# Patient Record
Sex: MALE | Race: WHITE | NOT HISPANIC OR LATINO | URBAN - METROPOLITAN AREA
[De-identification: names, ages, dates, MRNs, and addresses within clinical notes are randomized per-mention and may not be internally consistent; named-entity substitution may affect disease eponyms.]

---

## 2019-02-19 RX ORDER — CEFDINIR 250 MG/5ML
1 POWDER, FOR SUSPENSION ORAL
Qty: 0 | Refills: 0 | DISCHARGE
Start: 2019-02-19 | End: 2019-02-28

## 2019-02-19 RX ORDER — AZITHROMYCIN 500 MG/1
0 TABLET, FILM COATED ORAL
Qty: 0 | Refills: 0 | DISCHARGE
Start: 2019-02-19 | End: 2019-02-23

## 2019-02-22 ENCOUNTER — INPATIENT (INPATIENT)
Facility: HOSPITAL | Age: 59
LOS: 12 days | Discharge: HOME | DRG: 308 | End: 2019-03-07
Attending: INTERNAL MEDICINE | Admitting: INTERNAL MEDICINE
Payer: COMMERCIAL

## 2019-02-22 VITALS
RESPIRATION RATE: 24 BRPM | TEMPERATURE: 98 F | HEART RATE: 135 BPM | WEIGHT: 315 LBS | SYSTOLIC BLOOD PRESSURE: 166 MMHG | HEIGHT: 76 IN | OXYGEN SATURATION: 97 % | DIASTOLIC BLOOD PRESSURE: 79 MMHG

## 2019-02-22 DIAGNOSIS — I49.8 OTHER SPECIFIED CARDIAC ARRHYTHMIAS: ICD-10-CM

## 2019-02-22 DIAGNOSIS — I48.92 UNSPECIFIED ATRIAL FLUTTER: ICD-10-CM

## 2019-02-22 DIAGNOSIS — R06.00 DYSPNEA, UNSPECIFIED: ICD-10-CM

## 2019-02-22 DIAGNOSIS — R07.9 CHEST PAIN, UNSPECIFIED: ICD-10-CM

## 2019-02-22 DIAGNOSIS — Z83.2 FAMILY HISTORY OF DISEASES OF THE BLOOD AND BLOOD-FORMING ORGANS AND CERTAIN DISORDERS INVOLVING THE IMMUNE MECHANISM: ICD-10-CM

## 2019-02-22 LAB
ALBUMIN SERPL ELPH-MCNC: 4.2 G/DL — SIGNIFICANT CHANGE UP (ref 3.3–5)
ALP SERPL-CCNC: 70 U/L — SIGNIFICANT CHANGE UP (ref 40–120)
ALT FLD-CCNC: 52 U/L — HIGH (ref 10–45)
ANION GAP SERPL CALC-SCNC: 13 MMOL/L — SIGNIFICANT CHANGE UP (ref 5–17)
APTT BLD: 26.8 SEC — LOW (ref 27.5–36.3)
APTT BLD: 71.7 SEC — HIGH (ref 27.5–36.3)
AST SERPL-CCNC: 45 U/L — HIGH (ref 10–40)
BASOPHILS # BLD AUTO: 0.1 K/UL — SIGNIFICANT CHANGE UP (ref 0–0.2)
BASOPHILS NFR BLD AUTO: 1.2 % — SIGNIFICANT CHANGE UP (ref 0–2)
BILIRUB SERPL-MCNC: 0.6 MG/DL — SIGNIFICANT CHANGE UP (ref 0.2–1.2)
BUN SERPL-MCNC: 15 MG/DL — SIGNIFICANT CHANGE UP (ref 7–23)
CALCIUM SERPL-MCNC: 9.2 MG/DL — SIGNIFICANT CHANGE UP (ref 8.4–10.5)
CHLORIDE SERPL-SCNC: 102 MMOL/L — SIGNIFICANT CHANGE UP (ref 96–108)
CO2 SERPL-SCNC: 24 MMOL/L — SIGNIFICANT CHANGE UP (ref 22–31)
CREAT SERPL-MCNC: 1.31 MG/DL — HIGH (ref 0.5–1.3)
EOSINOPHIL # BLD AUTO: 0.1 K/UL — SIGNIFICANT CHANGE UP (ref 0–0.5)
EOSINOPHIL NFR BLD AUTO: 1.8 % — SIGNIFICANT CHANGE UP (ref 0–6)
GAS PNL BLDV: SIGNIFICANT CHANGE UP
GLUCOSE SERPL-MCNC: 101 MG/DL — HIGH (ref 70–99)
HCT VFR BLD CALC: 41.8 % — SIGNIFICANT CHANGE UP (ref 39–50)
HGB BLD-MCNC: 13.9 G/DL — SIGNIFICANT CHANGE UP (ref 13–17)
HMPV RNA SPEC QL NAA+PROBE: DETECTED
INR BLD: 1.29 RATIO — HIGH (ref 0.88–1.16)
LYMPHOCYTES # BLD AUTO: 1.1 K/UL — SIGNIFICANT CHANGE UP (ref 1–3.3)
LYMPHOCYTES # BLD AUTO: 19 % — SIGNIFICANT CHANGE UP (ref 13–44)
MCHC RBC-ENTMCNC: 30.4 PG — SIGNIFICANT CHANGE UP (ref 27–34)
MCHC RBC-ENTMCNC: 33.2 GM/DL — SIGNIFICANT CHANGE UP (ref 32–36)
MCV RBC AUTO: 91.5 FL — SIGNIFICANT CHANGE UP (ref 80–100)
MONOCYTES # BLD AUTO: 0.7 K/UL — SIGNIFICANT CHANGE UP (ref 0–0.9)
MONOCYTES NFR BLD AUTO: 12.2 % — SIGNIFICANT CHANGE UP (ref 2–14)
NEUTROPHILS # BLD AUTO: 4 K/UL — SIGNIFICANT CHANGE UP (ref 1.8–7.4)
NEUTROPHILS NFR BLD AUTO: 65.9 % — SIGNIFICANT CHANGE UP (ref 43–77)
NT-PROBNP SERPL-SCNC: 1746 PG/ML — HIGH (ref 0–300)
PLATELET # BLD AUTO: 168 K/UL — SIGNIFICANT CHANGE UP (ref 150–400)
POTASSIUM SERPL-MCNC: 4.2 MMOL/L — SIGNIFICANT CHANGE UP (ref 3.5–5.3)
POTASSIUM SERPL-SCNC: 4.2 MMOL/L — SIGNIFICANT CHANGE UP (ref 3.5–5.3)
PROT SERPL-MCNC: 7.4 G/DL — SIGNIFICANT CHANGE UP (ref 6–8.3)
PROTHROM AB SERPL-ACNC: 15 SEC — HIGH (ref 10–12.9)
RAPID RVP RESULT: DETECTED
RBC # BLD: 4.57 M/UL — SIGNIFICANT CHANGE UP (ref 4.2–5.8)
RBC # FLD: 13.5 % — SIGNIFICANT CHANGE UP (ref 10.3–14.5)
SODIUM SERPL-SCNC: 139 MMOL/L — SIGNIFICANT CHANGE UP (ref 135–145)
TROPONIN T, HIGH SENSITIVITY RESULT: 20 NG/L — SIGNIFICANT CHANGE UP (ref 0–51)
WBC # BLD: 6 K/UL — SIGNIFICANT CHANGE UP (ref 3.8–10.5)
WBC # FLD AUTO: 6 K/UL — SIGNIFICANT CHANGE UP (ref 3.8–10.5)

## 2019-02-22 PROCEDURE — 71046 X-RAY EXAM CHEST 2 VIEWS: CPT | Mod: 26

## 2019-02-22 PROCEDURE — 99285 EMERGENCY DEPT VISIT HI MDM: CPT | Mod: 25

## 2019-02-22 PROCEDURE — 93010 ELECTROCARDIOGRAM REPORT: CPT

## 2019-02-22 PROCEDURE — 71275 CT ANGIOGRAPHY CHEST: CPT | Mod: 26

## 2019-02-22 RX ORDER — HEPARIN SODIUM 5000 [USP'U]/ML
10000 INJECTION INTRAVENOUS; SUBCUTANEOUS EVERY 6 HOURS
Refills: 0 | Status: DISCONTINUED | OUTPATIENT
Start: 2019-02-22 | End: 2019-02-27

## 2019-02-22 RX ORDER — HEPARIN SODIUM 5000 [USP'U]/ML
10000 INJECTION INTRAVENOUS; SUBCUTANEOUS ONCE
Refills: 0 | Status: DISCONTINUED | OUTPATIENT
Start: 2019-02-22 | End: 2019-02-22

## 2019-02-22 RX ORDER — HEPARIN SODIUM 5000 [USP'U]/ML
10000 INJECTION INTRAVENOUS; SUBCUTANEOUS EVERY 6 HOURS
Refills: 0 | Status: DISCONTINUED | OUTPATIENT
Start: 2019-02-22 | End: 2019-02-22

## 2019-02-22 RX ORDER — HEPARIN SODIUM 5000 [USP'U]/ML
5000 INJECTION INTRAVENOUS; SUBCUTANEOUS EVERY 6 HOURS
Refills: 0 | Status: DISCONTINUED | OUTPATIENT
Start: 2019-02-22 | End: 2019-02-22

## 2019-02-22 RX ORDER — METOPROLOL TARTRATE 50 MG
50 TABLET ORAL
Refills: 0 | Status: DISCONTINUED | OUTPATIENT
Start: 2019-02-22 | End: 2019-03-01

## 2019-02-22 RX ORDER — DILTIAZEM HCL 120 MG
30 CAPSULE, EXT RELEASE 24 HR ORAL ONCE
Refills: 0 | Status: COMPLETED | OUTPATIENT
Start: 2019-02-22 | End: 2019-02-22

## 2019-02-22 RX ORDER — DILTIAZEM HCL 120 MG
10 CAPSULE, EXT RELEASE 24 HR ORAL ONCE
Refills: 0 | Status: COMPLETED | OUTPATIENT
Start: 2019-02-22 | End: 2019-02-22

## 2019-02-22 RX ORDER — HEPARIN SODIUM 5000 [USP'U]/ML
5000 INJECTION INTRAVENOUS; SUBCUTANEOUS EVERY 6 HOURS
Refills: 0 | Status: DISCONTINUED | OUTPATIENT
Start: 2019-02-22 | End: 2019-02-27

## 2019-02-22 RX ORDER — HEPARIN SODIUM 5000 [USP'U]/ML
INJECTION INTRAVENOUS; SUBCUTANEOUS
Qty: 25000 | Refills: 0 | Status: DISCONTINUED | OUTPATIENT
Start: 2019-02-22 | End: 2019-02-27

## 2019-02-22 RX ORDER — HEPARIN SODIUM 5000 [USP'U]/ML
INJECTION INTRAVENOUS; SUBCUTANEOUS
Qty: 25000 | Refills: 0 | Status: DISCONTINUED | OUTPATIENT
Start: 2019-02-22 | End: 2019-02-22

## 2019-02-22 RX ORDER — ASPIRIN/CALCIUM CARB/MAGNESIUM 324 MG
81 TABLET ORAL DAILY
Refills: 0 | Status: DISCONTINUED | OUTPATIENT
Start: 2019-02-22 | End: 2019-03-01

## 2019-02-22 RX ORDER — HEPARIN SODIUM 5000 [USP'U]/ML
10000 INJECTION INTRAVENOUS; SUBCUTANEOUS ONCE
Refills: 0 | Status: COMPLETED | OUTPATIENT
Start: 2019-02-22 | End: 2019-02-22

## 2019-02-22 RX ADMIN — HEPARIN SODIUM 2400 UNIT(S)/HR: 5000 INJECTION INTRAVENOUS; SUBCUTANEOUS at 16:45

## 2019-02-22 RX ADMIN — Medication 10 MILLIGRAM(S): at 17:15

## 2019-02-22 RX ADMIN — Medication 30 MILLIGRAM(S): at 17:18

## 2019-02-22 RX ADMIN — HEPARIN SODIUM 10000 UNIT(S): 5000 INJECTION INTRAVENOUS; SUBCUTANEOUS at 16:44

## 2019-02-22 NOTE — ED ADULT NURSE NOTE - OBJECTIVE STATEMENT
Came in with c/o SOB continuous, no improvement, cannot lie down, and has worsening b/l leg swelling. Started back pain, since yesterday, sharp, mid/high, 8/10, continuous, some relief by taking aspirin. Denies long travel or trauma. May have sick contact, his house is close to a hospital. Had runny nose and cough x 2-3 wks.

## 2019-02-22 NOTE — ED ADULT NURSE REASSESSMENT NOTE - NS ED NURSE REASSESS COMMENT FT1
Report received from Christiano PIZARRO. Pt AAOx4, NAD, resp mildly labored, skin warm/dry, resting comfortably in bed. Pt c/o . Pt denies headache, dizziness, chest pain, palpitations, abd pain, n/v/d, urinary symptoms, fevers, chills, weakness at this time. Pt awaiting CT scan. Safety maintained.

## 2019-02-22 NOTE — ED PROVIDER NOTE - PHYSICAL EXAMINATION
PGY1/MD Nilo.   VITALS: reviewed  GEN: Mild distress, A & O x 4  HEAD/EYES: NC/AT, anicteric sclerae, no conjunctival pallor  ENT: mucus membranes moist, oropharynx WNL, trachea midline, no JVD, neck is supple  RESP: lungs CTA with equal breath sounds bilaterally, no remarkable wheeze or crackle, chest wall nontender and atraumatic  CV: heart with reg rhythm S1, S2, no murmur; distal pulses intact and symmetric bilaterally, b/l leg swelling/skin tightness with no erythema or (-) Davalos's signs  ABDOMEN: normoactive bowel sounds, soft, nondistended, nontender, no palpable masses  MSK: extremities atraumatic and nontender, no edema, no asymmetry.   SKIN: warm, dry, no rash, no bruising, no cyanosis. color appropriate for ethnicity  NEURO: alert, mentating appropriately, no facial asymmetry.  PSYCH: Affect appropriate PGY1/MD Nilo.   VITALS: reviewed  GEN: Mild distress, A & O x 4  HEAD/EYES: NC/AT, anicteric sclerae, no conjunctival pallor  ENT: mucus membranes moist, oropharynx WNL, trachea midline, no JVD, neck is supple  RESP: lungs CTA with equal breath sounds bilaterally, no remarkable wheeze or crackle, chest wall nontender and atraumatic  CV: heart with reg rhythm S1, S2, no murmur; distal pulses intact and symmetric bilaterally, b/l leg swelling/skin tightness with no erythema or (-) Davalos's signs  ABDOMEN: normoactive bowel sounds, soft, nondistended, nontender, no palpable masses  MSK: extremities atraumatic and nontender, no edema, no asymmetry.   SKIN: warm, dry, no rash, no bruising, no cyanosis. color appropriate for ethnicity  NEURO: alert, mentating appropriately, no facial asymmetry.  PSYCH: Affect appropriate      Attn - tachycardia, no pallor, moist mm, lungs - clear, no w/r/r, cor - tachycardia, abdo - morbidly obese.  ext - no edema or C/T. neuro - grossly intact. skin - warm and dry.

## 2019-02-22 NOTE — H&P ADULT - ASSESSMENT
59 yo M with no significant PMH but has significant FH of PE (brother  of PE, sister had PE), genetic hypercoagulation (nephew), p/w 2 wk, SOB. Continuous, no improvement, cannot lie down, and has worsening b/l leg swelling. Started back pain, since yesterday, sharp, mid/high, 8/10, continuous, some relief by taking aspirin. Denies long travel or trauma. May have sick contact, his house is close to a hospital. Had runny nose and cough x 2-3 wks. Was seen in [facility] and started on Abxs . His symptoms got worse so came here .

## 2019-02-22 NOTE — ED ADULT NURSE REASSESSMENT NOTE - NS ED NURSE REASSESS COMMENT FT1
Patient a&ox3, no acute distress, resp nonlabored, resting comfortably in bed with family at bedside.  C/o low back pain from stretcher.  Denies headache, dizziness, chest pain, palpitations, SOB, abd pain, n/v/d, urinary symptoms, fevers, chills, weakness at this time. Currently admitted awaiting inpatient bed placement. Continuous cardiac monitoring maintained. VSS NAD. Contact isolation maintained. Safety maintained.

## 2019-02-22 NOTE — ED PROVIDER NOTE - OBJECTIVE STATEMENT
PGY1/MD Nilo. 57 yo M with no significant PMH but has significant FH of PE (brother  of PE, sister had PE), genetic hypercoagulation (nephew), p/w 2 wk, SOB. Continuous, no improvement, cannot lie down, and has worsening b/l leg swelling. Started back pain, since yesterday, sharp, mid/high, 8/10, continuous, some relief by taking aspirin. Denies long travel or trauma. May have sick contact, his house is close to a hospital. Had runny nose and cough x 2-3 wks.    PMF=passed out recently, no PMD now. 1500 PGY1/MD Nilo. 59 yo M with no significant PMH but has significant FH of PE (brother  of PE, sister had PE), genetic hypercoagulation (nephew), p/w 2 wk, SOB. Continuous, no improvement, cannot lie down, and has worsening b/l leg swelling. Started back pain, since yesterday, sharp, mid/high, 8/10, continuous, some relief by taking aspirin. Denies long travel or trauma. May have sick contact, his house is close to a hospital. Had runny nose and cough x 2-3 wks.  PMF=passed out recently, no PMD now.       Attn - pt seen in [facility] - agree with above - pt with cough x 2 weeks and now with SOB and CASSIDY and upper back pain x 3 days.  no chest pain, leg edema or calf pain.  +++ family hx PE.  no personal risks. PGY1/MD Nilo. 57 yo M with no significant PMH but has significant FH of PE (brother  of PE, sister had PE), genetic hypercoagulation (nephew), p/w 2 wk, SOB. Continuous, no improvement, cannot lie down, and has worsening b/l leg swelling. Started back pain, since yesterday, sharp, mid/high, 8/10, continuous, some relief by taking aspirin. Denies long travel or trauma. May have sick contact, his house is close to a hospital. Had runny nose and cough x 2-3 wks.  PCP=passed out recently, no PCP now.       Attn - pt seen in [facility] - agree with above - pt with cough x 2 weeks and now with SOB and CASSIDY and upper back pain x 3 days.  no chest pain, leg edema or calf pain.  +++ family hx PE.  no personal risks.

## 2019-02-22 NOTE — H&P ADULT - HISTORY OF PRESENT ILLNESS
57 yo M with no significant PMH but has significant FH of PE (brother  of PE, sister had PE), genetic hypercoagulation (nephew), p/w 2 wk, SOB. Continuous, no improvement, cannot lie down, and has worsening b/l leg swelling. Started back pain, since yesterday, sharp, mid/high, 8/10, continuous, some relief by taking aspirin. Denies long travel or trauma. May have sick contact, his house is close to a hospital. Had runny nose and cough x 2-3 wks. Was seen in [facility] and started on Abxs . His symptoms got worse so came here .

## 2019-02-22 NOTE — ED ADULT NURSE REASSESSMENT NOTE - NS ED NURSE REASSESS COMMENT FT1
Second IV placed for additional access. Heparin bolus and infusion initiated as per orders based off Heparin nomogram. Second RN present to confirm correct medication administration. Patient currently safe and comfortable. Will continue to monitor.

## 2019-02-22 NOTE — CONSULT NOTE ADULT - SUBJECTIVE AND OBJECTIVE BOX
Requesting Physician : Dr. Gunter    Reason for Consultation: SOB    HISTORY OF PRESENT ILLNESS:  57 yo M with PMHx of obesity who is being seen for dyspnea and aflutter.  The patient presented to the ED with several day history of cough and sob.  The patient also reports CASSIDY during this time.  He denies chest pain or palpitations.  He was admitted and found to have new onset Aflutter and metapneumovirus.   Cardiology consulted to further evaluate.  The patient denies recent cardiac workup or history of arrhythmias  He was started on IV heparin and admitted to telemetry.       PAST MEDICAL & SURGICAL HISTORY:  Obesity  No significant past surgical history          MEDICATIONS:  MEDICATIONS  (STANDING):  heparin  Infusion.  Unit(s)/Hr (24 mL/Hr) IV Continuous <Continuous>      Allergies    No Known Allergies    Intolerances        FAMILY HISTORY:    Non-contributary for premature coronary disease or sudden cardiac death    SOCIAL HISTORY:    [x ] Non-smoker  [ ] Smoker  [ ] Alcohol      REVIEW OF SYSTEMS:  [ ]chest pain  [ x ]shortness of breath  [  ]palpitations  [  ]syncope  [ ]near syncope [ ]upper extremity weakness   [ ] lower extremity weakness  [  ]diplopia  [  ]altered mental status   [  ]fevers  [ ]chills [ ]nausea  [ ]vomitting  [  ]dysphagia    [ ]abdominal pain  [ ]melena  [ ]BRBPR    [  ]epistaxis  [  ]rash    [x ]lower extremity edema        [x ] All others negative	  [ ] Unable to obtain    PHYSICAL EXAM:  T(C): 36.8 (02-22-19 @ 15:26), Max: 36.8 (02-22-19 @ 15:26)  HR: 130 (02-22-19 @ 15:26) (130 - 135)  BP: 143/79 (02-22-19 @ 15:26) (143/79 - 166/79)  RR: 20 (02-22-19 @ 15:26) (20 - 24)  SpO2: 98% (02-22-19 @ 15:26) (97% - 98%)  Wt(kg): --  I&O's Summary      	  Lymphatic: No lymphadenopathy , + edema in LE bilaterally   Cardiovascular: Normal S1 S2, IRRR No murmurs , Peripheral pulses palpable 2+ bilaterally  Respiratory: Lungs clear to auscultation, normal effort 	  Gastrointestinal:  Soft, Non-tender, + BS	  Skin: + erythema in LE bilaterally   Musculoskeletal: Normal range of motion, normal strength  Psychiatry:  Mood & affect appropriate      TELEMETRY: AFl	    ECG: AFl 	  RADIOLOGY:  OTHER:     DIAGNOSTIC TESTING:  [ ] Echocardiogram:  [ ]  Catheterization:  [ ] Stress Test:    	  	  LABS:	 	    CARDIAC MARKERS:                              13.9   6.0   )-----------( 168      ( 22 Feb 2019 14:29 )             41.8     02-22    139  |  102  |  15  ----------------------------<  101<H>  4.2   |  24  |  1.31<H>    Ca    9.2      22 Feb 2019 14:29    TPro  7.4  /  Alb  4.2  /  TBili  0.6  /  DBili  x   /  AST  45<H>  /  ALT  52<H>  /  AlkPhos  70  02-22    proBNP: Serum Pro-Brain Natriuretic Peptide: 1746 pg/mL (02-22 @ 14:29)    Lipid Profile:   HgA1c:   TSH:     ASSESSMENT/PLAN: 	58yMale with history of obesity admitted with new onset AF and dyspnea.   -agree with IV hep gtt for cva prevention until CHADS-vasc score determined  -would check TTE to evaluate LV function  -may need ischemic evaluation pending above  -recommend EP consult with Dr. Heard to evaluate for ablation  -further workup pending above    Puneet Vidal MD

## 2019-02-22 NOTE — H&P ADULT - PROBLEM SELECTOR PLAN 4
Sec to Atrial flutter as well viral infection CTA noted. < from: CT Angio Chest w/ IV Cont (02.22.19 @ 16:31) >. Will check Doppler legs to R/O DVT.       Markedly limited study.    No pulmonary embolism in the main, right, and left pulmonary arteries.   Limited evaluation of the lobar, segmental, and subsegmental pulmonary   arteries.

## 2019-02-22 NOTE — ED PROVIDER NOTE - CLINICAL SUMMARY MEDICAL DECISION MAKING FREE TEXT BOX
PGY1/MD Nilo. 59 yo M with significant Fx hypercoagulopathy, p/w 2 wk SOB, with recent back pain. No evidence of DVT but needs evaluation of PE given tachycardia, tachypnea and fx. r/o acs, viral infection, pneumonia, chf. PGY1/MD Nilo. 59 yo M with significant Fx hypercoagulopathy, p/w 2 wk SOB, with recent back pain. No evidence of DVT but needs evaluation of PE given tachycardia, tachypnea and fx. r/o acs, viral infection, pneumonia, chf.     Attn - pt with sob, cough, back pain and tachycardia.  r/o PE (strong family hx) vs PN  CT Pulm angiogram, CXR, EKG, labs. PGY1/MD Nilo. 57 yo M with significant Fx hypercoagulopathy, p/w 2 wk SOB, with recent back pain. No evidence of DVT but needs evaluation of PE given tachycardia, tachypnea and fx. r/o acs, viral infection, pneumonia, chf.     Attn - pt with sob, cough, back pain and tachycardia.  r/o PE (strong family hx) vs PN  CT Pulm angiogram, CXR, EKG - A flutter - rate control  - , labs.

## 2019-02-22 NOTE — ED PROVIDER NOTE - PROGRESS NOTE DETAILS
PGY1/MD Nilo. viral panel positive to MVP, no evidence of PE. Alutter which might be triggered by recent viral infection can be the reason of backpain/sob. Aflutter needs unticoaglation and abrasion therapy. we will decrease the heart rate using diltiazem, admission was accepted by Dr. Gunter.

## 2019-02-22 NOTE — H&P ADULT - PROBLEM SELECTOR PLAN 1
Rate control with BB and AC . EP to see patient tomorrow for further management .  Cardiology consult noted. TTE and TFT pending.

## 2019-02-23 LAB
ANION GAP SERPL CALC-SCNC: 14 MMOL/L — SIGNIFICANT CHANGE UP (ref 5–17)
APTT BLD: 83.8 SEC — HIGH (ref 27.5–36.3)
BUN SERPL-MCNC: 15 MG/DL — SIGNIFICANT CHANGE UP (ref 7–23)
CALCIUM SERPL-MCNC: 9.2 MG/DL — SIGNIFICANT CHANGE UP (ref 8.4–10.5)
CHLORIDE SERPL-SCNC: 102 MMOL/L — SIGNIFICANT CHANGE UP (ref 96–108)
CO2 SERPL-SCNC: 22 MMOL/L — SIGNIFICANT CHANGE UP (ref 22–31)
CREAT SERPL-MCNC: 1.24 MG/DL — SIGNIFICANT CHANGE UP (ref 0.5–1.3)
GLUCOSE SERPL-MCNC: 119 MG/DL — HIGH (ref 70–99)
HCT VFR BLD CALC: 39.8 % — SIGNIFICANT CHANGE UP (ref 39–50)
HGB BLD-MCNC: 12.4 G/DL — LOW (ref 13–17)
MCHC RBC-ENTMCNC: 29.4 PG — SIGNIFICANT CHANGE UP (ref 27–34)
MCHC RBC-ENTMCNC: 31.2 GM/DL — LOW (ref 32–36)
MCV RBC AUTO: 94.3 FL — SIGNIFICANT CHANGE UP (ref 80–100)
PLATELET # BLD AUTO: 185 K/UL — SIGNIFICANT CHANGE UP (ref 150–400)
POTASSIUM SERPL-MCNC: 4.6 MMOL/L — SIGNIFICANT CHANGE UP (ref 3.5–5.3)
POTASSIUM SERPL-SCNC: 4.6 MMOL/L — SIGNIFICANT CHANGE UP (ref 3.5–5.3)
RBC # BLD: 4.22 M/UL — SIGNIFICANT CHANGE UP (ref 4.2–5.8)
RBC # FLD: 14.4 % — SIGNIFICANT CHANGE UP (ref 10.3–14.5)
SODIUM SERPL-SCNC: 138 MMOL/L — SIGNIFICANT CHANGE UP (ref 135–145)
WBC # BLD: 5.88 K/UL — SIGNIFICANT CHANGE UP (ref 3.8–10.5)
WBC # FLD AUTO: 5.88 K/UL — SIGNIFICANT CHANGE UP (ref 3.8–10.5)

## 2019-02-23 PROCEDURE — 70450 CT HEAD/BRAIN W/O DYE: CPT | Mod: 26

## 2019-02-23 RX ORDER — FUROSEMIDE 40 MG
20 TABLET ORAL DAILY
Refills: 0 | Status: DISCONTINUED | OUTPATIENT
Start: 2019-02-23 | End: 2019-03-01

## 2019-02-23 RX ORDER — INFLUENZA VIRUS VACCINE 15; 15; 15; 15 UG/.5ML; UG/.5ML; UG/.5ML; UG/.5ML
0.5 SUSPENSION INTRAMUSCULAR ONCE
Refills: 0 | Status: DISCONTINUED | OUTPATIENT
Start: 2019-02-23 | End: 2019-03-07

## 2019-02-23 RX ADMIN — HEPARIN SODIUM 2400 UNIT(S)/HR: 5000 INJECTION INTRAVENOUS; SUBCUTANEOUS at 10:09

## 2019-02-23 RX ADMIN — Medication 50 MILLIGRAM(S): at 17:25

## 2019-02-23 RX ADMIN — Medication 20 MILLIGRAM(S): at 14:07

## 2019-02-23 RX ADMIN — HEPARIN SODIUM 2400 UNIT(S)/HR: 5000 INJECTION INTRAVENOUS; SUBCUTANEOUS at 00:00

## 2019-02-23 RX ADMIN — Medication 50 MILLIGRAM(S): at 01:51

## 2019-02-23 RX ADMIN — Medication 200 MILLIGRAM(S): at 02:42

## 2019-02-23 RX ADMIN — Medication 200 MILLIGRAM(S): at 17:25

## 2019-02-23 RX ADMIN — Medication 81 MILLIGRAM(S): at 11:02

## 2019-02-23 NOTE — CONSULT NOTE ADULT - ATTENDING COMMENTS
HISTORY OF PRESENT ILLNESS  Jeanette Benito is a 52 y.o. male. Shortness of Breath   The history is provided by the patient. This is a chronic problem. The problem occurs intermittently. The current episode started more than 1 week ago. Pertinent negatives include no fever, no headaches, no cough, no wheezing, no PND, no orthopnea, no chest pain, no vomiting, no rash, no leg swelling and no claudication. The problem's precipitants include exercise (walking in the house). Leg Swelling   The history is provided by the patient. This is a chronic problem. The current episode started more than 1 week ago. The problem occurs every several days. Associated symptoms include shortness of breath. Pertinent negatives include no chest pain and no headaches. The symptoms are aggravated by standing. The symptoms are relieved by sleep (HD). Review of Systems   Constitutional: Negative for chills, fever, malaise/fatigue and weight loss. HENT: Negative for nosebleeds. Eyes: Negative for discharge. Respiratory: Positive for shortness of breath. Negative for cough and wheezing. Cardiovascular: Negative for chest pain, palpitations, orthopnea, claudication, leg swelling and PND. Gastrointestinal: Negative for diarrhea, nausea and vomiting. Genitourinary: Negative for dysuria and hematuria. Musculoskeletal: Negative for joint pain. Skin: Negative for rash. Neurological: Negative for dizziness, seizures, loss of consciousness and headaches. Endo/Heme/Allergies: Negative for polydipsia. Does not bruise/bleed easily. Psychiatric/Behavioral: Negative for depression and substance abuse. The patient does not have insomnia. Allergies   Allergen Reactions    Amlodipine Swelling    Nuts [Tree Nut] Swelling    Pcn [Penicillins] Unknown (comments)    Phenothiazines Other (comments)     Per father, \"He has a parkinsonian reaction to this medication. \"       Past Medical History:   Diagnosis Date    Anemia in chronic kidney disease 4/24/2013    Cardiomyopathy due to hypertension ([facility] 4/24/2013    EF 20%    Edema of lower extremity 4/24/2013    End stage renal disease ([facility] 04/19/2013    HD since 9/2003    Hyperphosphatemia 4/24/2013    Hypertension     Hypertension, uncontrolled 4/24/2013    Personal history of atrial flutter 4/24/2013    Pulmonary hypertension ([facility] 4/24/2013    Recommendation refused by patient 4/24/2013    Patient refuses dialysis (hemodialysis or peritoneal dialysis)    Secondary hyperparathyroidism of renal origin ([facility] 4/24/2013    Stroke ([facility]     x2 1/2015    Vitamin D insufficiency 4/25/2013    Vitamin D 25-Hydroxy 20.7        Family History   Problem Relation Age of Onset    Hypertension Mother     Hypertension Father     Heart Attack Neg Hx     Stroke Neg Hx        Social History   Substance Use Topics    Smoking status: Never Smoker    Smokeless tobacco: Never Used    Alcohol use No        Current Outpatient Prescriptions   Medication Sig    thiamine (VITAMIN B-1) 100 mg tablet Take  by mouth daily.  sevelamer carbonate (RENVELA) 800 mg tab tab Take  by mouth three (3) times daily.  mupirocin calcium (BACTROBAN NASAL) 2 % nasal ointment by Both Nostrils route two (2) times a day.  allopurinol (ZYLOPRIM) 100 mg tablet TAKE 1 TABLET BY MOUTH TWICE DAILY    NIFEdipine ER (PROCARDIA XL) 30 mg ER tablet TAKE 1 TABLET BY MOUTH DAILY    carvedilol (COREG) 25 mg tablet TAKE 1 TABLET BY MOUTH TWICE DAILY WITH MEALS    lisinopril (PRINIVIL, ZESTRIL) 40 mg tablet TAKE 1 TABLET BY MOUTH DAILY    levETIRAcetam (KEPPRA) 250 mg tablet TAKE 1 TABLET BY MOUTH EVERY Monday, Wednesday, Friday AFTER EACH DIALYSIS    levETIRAcetam (KEPPRA) 500 mg tablet TAKE 1 TABLET BY MOUTH DAILY    acetaminophen (TYLENOL) 500 mg tablet Take  by mouth every six (6) hours as needed for Pain.  simethicone (PHAZYME) 180 mg cap Take  by mouth.     hydrALAZINE (APRESOLINE) 100 mg tablet TAKE 1 TABLET BY MOUTH EVERY 8 HOURS    calcium acetate (PHOSLO) 667 mg cap Take 1 Cap by mouth three (3) times daily (with meals).  cloNIDine HCl (CATAPRES) 0.3 mg tablet Take 1 Tab by mouth three (3) times daily.  isosorbide mononitrate ER (IMDUR) 60 mg CR tablet Take 1 Tab by mouth every morning. (Patient taking differently: Take 120 mg by mouth every morning.)    oxyCODONE-acetaminophen (PERCOCET) 5-325 mg per tablet Take 1 Tab by mouth every eight (8) hours as needed. Max Daily Amount: 3 Tabs.  senna-docusate (PERICOLACE) 8.6-50 mg per tablet Take 1 Tab by mouth daily.  aspirin delayed-release 81 mg tablet Take 1 tablet by mouth daily.  pantoprazole (PROTONIX) 40 mg tablet Take 1 Tab by mouth Daily (before breakfast). [Request refills from PCP (Dr. Nakia Priest)]    cholecalciferol (VITAMIN D3) 1,000 unit tablet Take 2 Tabs by mouth two (2) times a day. [Request refills from PCP (Dr. Nakia Priest)]    AMINO ACIDS/PROTEIN HYDROLYS (PRO-STAT 64 PO) Take  by mouth.  b complex-vitamin c-folic acid (NEPHROCAPS) 1 mg capsule Take 1 Cap by mouth daily. [Request refills from PCP (Dr. Nakia Priest)]    metoprolol tartrate (LOPRESSOR) 100 mg IR tablet Take 2 Tabs by mouth every twelve (12) hours. No current facility-administered medications for this visit. Past Surgical History:   Procedure Laterality Date    HX CSF SHUNT  09/2016    HX HEART CATHETERIZATION  01/2015       Visit Vitals    BP (!) 191/128    Pulse 72    Ht 5' 11\" (1.803 m)    Wt 65.3 kg (144 lb)    BMI 20.08 kg/m2       Diagnostic Studies:  I have reviewed the relevant tests done on the patient and show as follows  EKG tracings reviewed by me today. No flowsheet data found. Mr. Georgia Landry has a reminder for a \"due or due soon\" health maintenance. I have asked that he contact his primary care provider for follow-up on this health maintenance.     12/16 ECHO  SUMMARY:  Left ventricle: Systolic function was moderately to markedly reduced by EF  (biplane method of disks). Ejection fraction was estimated in the range of  35 % to 40 %. There was moderate diffuse hypokinesis. Wall thickness was  markedly increased. Features were consistent with a pseudonormal left  ventricular filling pattern, with concomitant abnormal relaxation and  increased filling pressure (grade 2 diastolic dysfunction). Right ventricle: Systolic pressure was markedly increased. Estimated peak  pressure was at least 70 mmHg. Left atrium: The atrium was severely dilated. Right atrium: The atrium was dilated. Tricuspid valve: There was mild to moderate regurgitation. COMPARISONS:  There has been no significant change. Comparison was made with the  previous study of 27-Jun-2015. Physical Exam   Constitutional: He is oriented to person, place, and time. He appears well-developed and well-nourished. No distress. HENT:   Head: Normocephalic and atraumatic. Mouth/Throat: Normal dentition. Eyes: Right eye exhibits no discharge. Left eye exhibits no discharge. No scleral icterus. Neck: Neck supple. No JVD present. Carotid bruit is not present. No thyromegaly present. Cardiovascular: Normal rate, regular rhythm, S1 normal, S2 normal, normal heart sounds and intact distal pulses. Exam reveals no gallop and no friction rub. No murmur heard. Pulmonary/Chest: Effort normal and breath sounds normal. He has no wheezes. He has no rales. Abdominal: Soft. He exhibits no mass. There is no tenderness. Distended, ?ascites   Musculoskeletal: He exhibits no edema. Lymphadenopathy:        Right cervical: No superficial cervical adenopathy present. Left cervical: No superficial cervical adenopathy present. Neurological: He is alert and oriented to person, place, and time. Skin: Skin is warm and dry. No rash noted. Psychiatric: He has a normal mood and affect.  His behavior is normal.       ASSESSMENT and Wisam George was seen today for hypertension and cardiomyopathy. Diagnoses and all orders for this visit:    Uncontrolled hypertension  Comments:  4/17 BP seems labile but patient sometimes 508-758 systolic range. We will add Cardura    Orders:  -     NIFEdipine ER (PROCARDIA XL) 90 mg ER tablet; TAKE 3 TABLET BY MOUTH DAILY  Indications: hypertension  -     isosorbide mononitrate ER (IMDUR) 120 mg CR tablet; Take 1 Tab by mouth every morning.  -     doxazosin (CARDURA) 1 mg tablet; Take 1 Tab by mouth nightly. Cardiomyopathy due to hypertension, with heart failure [facility])  Comments:  4/17 he was 35-40% in December 2060     End stage renal disease [facility])  Comments:  HD started 2009 approximately    Syncope, unspecified syncope type  Comments:  Medicine procedures    Other ascites  Comments:  Last Tap approximately December 2016    Seizure [facility])  Comments:  Last episode December 2016 approximately. patient on [facility] [place]          Pertinent laboratory and test data reviewed and discussed with patient. See patient instructions also for other medical advice given    Medications Discontinued During This Encounter   Medication Reason    NIFEdipine ER (PROCARDIA XL) 30 mg ER tablet Reorder    isosorbide mononitrate ER (IMDUR) 60 mg CR tablet Reorder       Follow-up Disposition:  Return in about 3 months (around 7/10/2017), or if symptoms worsen or fail to improve. EP ATTENDING    Agree with above. Admitted with symptomatic typical AFL in setting of RVP.    Plan  -continue heparin drip for now until chads-vasc is established  -get echo, get tsh  -pending echo results will discuss his two options of 1) JOHN/DCCV or 2) JOHN/AFL ablation

## 2019-02-23 NOTE — CONSULT NOTE ADULT - SUBJECTIVE AND OBJECTIVE BOX
HISTORY OF PRESENT ILLNESS: 59 yo M with PMHx of obesity who is being seen for dyspnea and aflutter.  The patient presented to the ED with several day history of cough and sob.  The patient also reports CASSIDY during this time.  He denies chest pain or palpitations.  He was admitted and found to have new onset Aflutter and metapneumovirus.   Cardiology consulted to further evaluate.  The patient denies recent cardiac workup or history of arrhythmias  He was started on IV heparin and admitted to telemetry.     PAST MEDICAL & SURGICAL HISTORY:  Obesity  No significant past surgical history      [ ] Diabetes   [ ] Hypertension  [ ] Hyperlipidemia  [ ] CAD  [ ] PCI  [ ] CABG    PREVIOUS DIAGNOSTIC TESTING:    [ ] Echocardiogram:   [ ]  Catheterization:  [ ] Stress Test:  	    MEDICATIONS:  heparin  Infusion.  Unit(s)/Hr IV Continuous <Continuous>  heparin  Injectable 91462 Unit(s) IV Push every 6 hours PRN  heparin  Injectable 5000 Unit(s) IV Push every 6 hours PRN  metoprolol tartrate 50 milliGRAM(s) Oral two times a day      benzonatate 200 milliGRAM(s) Oral every 8 hours PRN    aspirin 81 milliGRAM(s) Oral daily        influenza   Vaccine 0.5 milliLiter(s) IntraMuscular once      Allergies    No Known Allergies    Intolerances        FAMILY HISTORY:      SOCIAL HISTORY:    [x ] Non-smoker  [ ] Smoker  [ ] Alcohol      REVIEW OF SYSTEMS:  [ ]chest pain  [x  ]shortness of breath  [ x ]palpitations  [  ]syncope  [ ]near syncope [  ]diplopia  [  ]altered mental status   [  ]fevers  [ ]chills [ ]nausea  [ ]vomitting  [ ]abdominal pain  [ ]melena  [ ]BRBPR  [  ]epistaxis  [  ]rash  [  ]lower extremity edema      CONSTITUTIONAL: No fever, weight loss, or fatigue  EYES: No eye pain, visual disturbances, or discharge  ENMT:  No difficulty hearing, tinnitus, vertigo; No sinus or throat pain  NECK: No pain or stiffness  RESPIRATORY: No cough, wheezing, chills or hemoptysis;  ++ Shortness of Breath  CARDIOVASCULAR: No chest pain, ++ palpitations,  + lower ext edema   GASTROINTESTINAL: No abdominal or epigastric pain. No nausea, vomiting, or hematemesis; No diarrhea or constipation. No melena or hematochezia.  GENITOURINARY: No dysuria, frequency, hematuria, or incontinence  NEUROLOGICAL: No headaches, memory loss, loss of strength, numbness, or tremors  SKIN: No itching, burning, rashes, or lesions   LYMPH Nodes: No enlarged glands  ENDOCRINE: No heat or cold intolerance; No hair loss  MUSCULOSKELETAL: No joint pain or swelling; No muscle, back, or extremity pain  PSYCHIATRIC: No depression, anxiety, mood swings, or difficulty sleeping    [x ] All others negative	  [ ] Unable to obtain    PHYSICAL EXAM:  T(C): 37.1 (02-23-19 @ 04:37), Max: 37.1 (02-23-19 @ 04:37)  HR: 94 (02-23-19 @ 04:37) (94 - 135)  BP: 149/83 (02-23-19 @ 04:37) (126/83 - 166/79)  RR: 18 (02-23-19 @ 04:37) (18 - 24)  SpO2: 95% (02-23-19 @ 04:37) (95% - 98%)  Wt(kg): --  I&O's Summary      Lymphatic: No lymphadenopathy , + edema in LE bilaterally   Cardiovascular: Normal S1 S2, IRRR No murmurs , Peripheral pulses palpable 2+ bilaterally  Respiratory: Lungs clear to auscultation, normal effort 	  Gastrointestinal:  Soft, Non-tender, + BS	  Skin: + erythema in LE bilaterally   Musculoskeletal: Normal range of motion, normal strength  Psychiatry:  Mood & affect appropriate      TELEMETRY: 	Aflutter     ECG:  	Aflutter, 92 , no acute Ischemia changes noted   RADIOLOGY: < from: CT Angio Chest w/ IV Cont (02.22.19 @ 16:31) >    IMPRESSION:     Markedly limited study.    No pulmonary embolism in the main, right, and left pulmonary arteries.   Limited evaluation of the lobar, segmental, and subsegmental pulmonary   arteries.          < end of copied text >    OTHER: 	  	  LABS:	 	    CARDIAC MARKERS:                                  13.9   6.0   )-----------( 168      ( 22 Feb 2019 14:29 )             41.8     02-22    139  |  102  |  15  ----------------------------<  101<H>  4.2   |  24  |  1.31<H>    Ca    9.2      22 Feb 2019 14:29    TPro  7.4  /  Alb  4.2  /  TBili  0.6  /  DBili  x   /  AST  45<H>  /  ALT  52<H>  /  AlkPhos  70  02-22    proBNP: Serum Pro-Brain Natriuretic Peptide: 1746 pg/mL (02-22 @ 14:29)    Lipid Profile:   HgA1c:   TSH:     ASSESSMENT/PLAN: 58 yr male , no PHX , but family hx of PE , now SOB , CHF, Palpitation . Found to be in Aflutter , +RVP     A/C with heparin gtt ,   ASA, statin   Rate controlled with Lopressor at present   ECHO pending ,   would start low dose diuretics   Cardiac markers neg   D/W Dr Heard

## 2019-02-23 NOTE — PROGRESS NOTE ADULT - ASSESSMENT
57 yo M with no significant PMH but has significant FH of PE (brother  of PE, sister had PE), genetic hypercoagulation (nephew), p/w 2 wk, SOB. Continuous, no improvement, cannot lie down, and has worsening b/l leg swelling. Started back pain, since yesterday, sharp, mid/high, 8/10, continuous, some relief by taking aspirin. Denies long travel or trauma. May have sick contact, his house is close to a hospital. Had runny nose and cough x 2-3 wks. Was seen in [facility] and started on Abxs . His symptoms got worse so came here .      Problem/Plan - 1:  ·  Problem: Atrial flutter.  Plan: Rate control with BB and AC . EP to see patient tomorrow for further management .  Cardiology consult noted. TTE and TFT pending.      Problem/Plan - 2:  ·  Problem: R/O Viral respiratory infection.  Plan: hmp virus. Supportive care.      Problem/Plan - 3:  ·  Problem: Chest pain.  Plan: R/O ACS and likely sec to A flutter as well viral infection.      Problem/Plan - 4:  ·  Problem: Dyspnea.  Plan: Sec to Atrial flutter as well viral infection CTA noted. < from: CT Angio Chest w/ IV Cont (19 @ 16:31) >. Will check Doppler legs to R/O DVT.   Markedly limited study.    No pulmonary embolism in the main, right, and left pulmonary arteries.   Limited evaluation of the lobar, segmental, and subsegmental pulmonary   arteries.      Problem/Plan - 5:  ·  Problem:  Obesity.  Plan: D/W patient in detail options to lose weight.      Problem/Plan - 6:  Problem: Family history of hypercoagulability. Plan: Will need outpt Work up . 59 yo M with no significant PMH but has significant FH of PE (brother  of PE, sister had PE), genetic hypercoagulation (nephew), p/w 2 wk, SOB. Continuous, no improvement, cannot lie down, and has worsening b/l leg swelling. Started back pain, since yesterday, sharp, mid/high, 8/10, continuous, some relief by taking aspirin. Denies long travel or trauma. May have sick contact, his house is close to a hospital. Had runny nose and cough x 2-3 wks. Was seen in [facility] and started on Abxs . His symptoms got worse so came here .      Problem/Plan - 1:  ·  Problem: Atrial flutter.  Plan: Rate control with BB and on AC . Doing well.   Cardiology consult noted. TTE and TFT pending.      Problem/Plan - 2:  ·  Problem:  Viral respiratory infection.  Plan: hmp virus. Supportive care.      Problem/Plan - 3:  ·  Problem: Chest pain.  Plan: R/O ACS and likely sec to A flutter as well viral infection.      Problem/Plan - 4:  ·  Problem: Dyspnea.  Plan: Sec to Atrial flutter as well viral infection CTA noted. < from: CT Angio Chest w/ IV Cont (19 @ 16:31) >. Will check Doppler legs to R/O DVT. BNP elevated . will add lasix.   Markedly limited study.    No pulmonary embolism in the main, right, and left pulmonary arteries.   Limited evaluation of the lobar, segmental, and subsegmental pulmonary   arteries.      Problem/Plan - 5:  ·  Problem:  Obesity.  Plan: D/W patient in detail options to lose weight.      Problem/Plan - 6:  Problem: Family history of hypercoagulability. Plan: Will need outpt Work up .

## 2019-02-23 NOTE — PROGRESS NOTE ADULT - SUBJECTIVE AND OBJECTIVE BOX
INTERVAL HPI/OVERNIGHT EVENTS: No new concerns.   Vital Signs Last 24 Hrs  T(C): 37.1 (23 Feb 2019 04:37), Max: 37.1 (23 Feb 2019 04:37)  T(F): 98.7 (23 Feb 2019 04:37), Max: 98.7 (23 Feb 2019 04:37)  HR: 94 (23 Feb 2019 04:37) (94 - 135)  BP: 149/83 (23 Feb 2019 04:37) (126/83 - 166/79)  BP(mean): --  RR: 18 (23 Feb 2019 04:37) (18 - 24)  SpO2: 95% (23 Feb 2019 04:37) (95% - 98%)  I&O's Summary    MEDICATIONS  (STANDING):  aspirin 81 milliGRAM(s) Oral daily  heparin  Infusion.  Unit(s)/Hr (24 mL/Hr) IV Continuous <Continuous>  influenza   Vaccine 0.5 milliLiter(s) IntraMuscular once  metoprolol tartrate 50 milliGRAM(s) Oral two times a day    MEDICATIONS  (PRN):  benzonatate 200 milliGRAM(s) Oral every 8 hours PRN Cough  heparin  Injectable 92450 Unit(s) IV Push every 6 hours PRN For aPTT less than 40  heparin  Injectable 5000 Unit(s) IV Push every 6 hours PRN For aPTT between 40 - 57    LABS:                        12.4   5.88  )-----------( 185      ( 23 Feb 2019 11:52 )             39.8     02-23    138  |  102  |  15  ----------------------------<  119<H>  4.6   |  22  |  1.24    Ca    9.2      23 Feb 2019 08:51    TPro  7.4  /  Alb  4.2  /  TBili  0.6  /  DBili  x   /  AST  45<H>  /  ALT  52<H>  /  AlkPhos  70  02-22    PT/INR - ( 22 Feb 2019 14:29 )   PT: 15.0 sec;   INR: 1.29 ratio         PTT - ( 23 Feb 2019 08:51 )  PTT:83.8 sec    CAPILLARY BLOOD GLUCOSE              REVIEW OF SYSTEMS:  CONSTITUTIONAL: No fever, weight loss, or fatigue  EYES: No eye pain, visual disturbances, or discharge  ENMT:  No difficulty hearing, tinnitus, vertigo; No sinus or throat pain  RESPIRATORY: No cough, wheezing, chills or hemoptysis; No shortness of breath  CARDIOVASCULAR: No chest pain, palpitations, dizziness, or leg swelling  GASTROINTESTINAL: No abdominal or epigastric pain. No nausea, vomiting, or hematemesis; No diarrhea or constipation. No melena or hematochezia.  GENITOURINARY: No dysuria, frequency, hematuria, or incontinence  NEUROLOGICAL: No headaches, memory loss, loss of strength, numbness, or tremors    Consultant(s) Notes Reviewed:  [x ] YES  [ ] NO    PHYSICAL EXAM:  GENERAL: NAD, well-groomed, well-developed, not in any distress ,  HEAD:  Atraumatic, Normocephalic  EYES: EOMI, PERRLA, conjunctiva and sclera clear  ENMT: No tonsillar erythema, exudates, or enlargement; Moist mucous membranes, Good dentition, No lesions  NECK: Supple, No JVD, Normal thyroid  NERVOUS SYSTEM:  Alert & Oriented X3, No focal deficit   CHEST/LUNG: Good air entry bilateral with no  rales, rhonchi, wheezing, or rubs  HEART: Irregular rate and rhythm; No murmurs, rubs, or gallops  ABDOMEN: Soft, Nontender, Nondistended; Bowel sounds present  EXTREMITIES:  2+ Peripheral Pulses, No clubbing, cyanosis, or edema    Care Discussed with Consultants/Other Providers [ x] YES  [ ] NO

## 2019-02-23 NOTE — CHART NOTE - NSCHARTNOTEFT_GEN_A_CORE
Called by RN for patient who lowered himself to the floor in her presence - patient confirmed events, noted that he had a pins and needles sensation in his right leg which he has had in the past - causing him to lower himself to the floor.  Patient denies head trauma, headache, dizziness, vision changes, chest pain, SOB, or prior falls.  No injury noted.   Head CT ordered, patient to ambulate with assistance, and placed on fall risk as d/w attending.    Bell De La Paz NP  [phone]

## 2019-02-23 NOTE — PROGRESS NOTE ADULT - SUBJECTIVE AND OBJECTIVE BOX
Subjective: no chest pain or sob  	  MEDICATIONS:  MEDICATIONS  (STANDING):  aspirin 81 milliGRAM(s) Oral daily  furosemide   Injectable 20 milliGRAM(s) IV Push daily  heparin  Infusion.  Unit(s)/Hr (24 mL/Hr) IV Continuous <Continuous>  influenza   Vaccine 0.5 milliLiter(s) IntraMuscular once  metoprolol tartrate 50 milliGRAM(s) Oral two times a day      LABS:	 	    CARDIAC MARKERS:                                12.4   5.88  )-----------( 185      ( 23 Feb 2019 11:52 )             39.8     02-23    138  |  102  |  15  ----------------------------<  119<H>  4.6   |  22  |  1.24    Ca    9.2      23 Feb 2019 08:51    TPro  7.4  /  Alb  4.2  /  TBili  0.6  /  DBili  x   /  AST  45<H>  /  ALT  52<H>  /  AlkPhos  70  02-22    proBNP:   Lipid Profile:   HgA1c:   TSH:       PHYSICAL EXAM:  T(C): 37 (02-23-19 @ 14:02), Max: 37.1 (02-23-19 @ 04:37)  HR: 106 (02-23-19 @ 14:02) (94 - 124)  BP: 116/70 (02-23-19 @ 14:02) (116/70 - 149/83)  RR: 18 (02-23-19 @ 14:02) (18 - 20)  SpO2: 94% (02-23-19 @ 14:02) (94% - 97%)  Wt(kg): --  I&O's Summary    23 Feb 2019 07:01  -  23 Feb 2019 16:11  --------------------------------------------------------  IN: 600 mL / OUT: 0 mL / NET: 600 mL          	  Lymphatic: No lymphadenopathy , no edema  Cardiovascular: Normal S1 S2,IRRR No murmurs , Peripheral pulses palpable 2+ bilaterally  Respiratory: Lungs clear to auscultation, normal effort 	  Gastrointestinal:  Soft, Non-tender, + BS	  Skin: No rashes, No ecchymoses, No cyanosis, warm to touch      TELEMETRY: AFl    ECG:  	  RADIOLOGY:   DIAGNOSTIC TESTING:  [ ] Echocardiogram:  [ ]  Catheterization:  [ ] Stress Test:    OTHER: 	      ASSESSMENT/PLAN: 59 yo M with PMHx of obesity who is being seen for dyspnea and aflutter  -continue with hep gtt if no contraindications  -pt. with fall today with no head trauma  -cth ordered by primary team - follow up results  -check TTE  -further workup pending above    Puneet Vidal MD

## 2019-02-24 LAB
ANION GAP SERPL CALC-SCNC: 15 MMOL/L — SIGNIFICANT CHANGE UP (ref 5–17)
APTT BLD: 83.4 SEC — HIGH (ref 27.5–36.3)
BUN SERPL-MCNC: 17 MG/DL — SIGNIFICANT CHANGE UP (ref 7–23)
CALCIUM SERPL-MCNC: 8.8 MG/DL — SIGNIFICANT CHANGE UP (ref 8.4–10.5)
CHLORIDE SERPL-SCNC: 99 MMOL/L — SIGNIFICANT CHANGE UP (ref 96–108)
CHOLEST SERPL-MCNC: 115 MG/DL — SIGNIFICANT CHANGE UP (ref 10–199)
CO2 SERPL-SCNC: 22 MMOL/L — SIGNIFICANT CHANGE UP (ref 22–31)
CREAT SERPL-MCNC: 1.39 MG/DL — HIGH (ref 0.5–1.3)
GLUCOSE SERPL-MCNC: 112 MG/DL — HIGH (ref 70–99)
HCT VFR BLD CALC: 39.7 % — SIGNIFICANT CHANGE UP (ref 39–50)
HDLC SERPL-MCNC: 26 MG/DL — LOW
HGB BLD-MCNC: 12.4 G/DL — LOW (ref 13–17)
LIPID PNL WITH DIRECT LDL SERPL: 73 MG/DL — SIGNIFICANT CHANGE UP
MCHC RBC-ENTMCNC: 29 PG — SIGNIFICANT CHANGE UP (ref 27–34)
MCHC RBC-ENTMCNC: 31.2 GM/DL — LOW (ref 32–36)
MCV RBC AUTO: 93 FL — SIGNIFICANT CHANGE UP (ref 80–100)
PLATELET # BLD AUTO: 167 K/UL — SIGNIFICANT CHANGE UP (ref 150–400)
POTASSIUM SERPL-MCNC: 4.2 MMOL/L — SIGNIFICANT CHANGE UP (ref 3.5–5.3)
POTASSIUM SERPL-SCNC: 4.2 MMOL/L — SIGNIFICANT CHANGE UP (ref 3.5–5.3)
RBC # BLD: 4.27 M/UL — SIGNIFICANT CHANGE UP (ref 4.2–5.8)
RBC # FLD: 14.3 % — SIGNIFICANT CHANGE UP (ref 10.3–14.5)
SODIUM SERPL-SCNC: 136 MMOL/L — SIGNIFICANT CHANGE UP (ref 135–145)
TOTAL CHOLESTEROL/HDL RATIO MEASUREMENT: 4.4 RATIO — SIGNIFICANT CHANGE UP (ref 3.4–9.6)
TRIGL SERPL-MCNC: 81 MG/DL — SIGNIFICANT CHANGE UP (ref 10–149)
WBC # BLD: 6.47 K/UL — SIGNIFICANT CHANGE UP (ref 3.8–10.5)
WBC # FLD AUTO: 6.47 K/UL — SIGNIFICANT CHANGE UP (ref 3.8–10.5)

## 2019-02-24 RX ORDER — METOPROLOL TARTRATE 50 MG
25 TABLET ORAL ONCE
Refills: 0 | Status: COMPLETED | OUTPATIENT
Start: 2019-02-24 | End: 2019-02-24

## 2019-02-24 RX ADMIN — Medication 200 MILLIGRAM(S): at 17:45

## 2019-02-24 RX ADMIN — Medication 600 MILLIGRAM(S): at 18:39

## 2019-02-24 RX ADMIN — Medication 50 MILLIGRAM(S): at 17:45

## 2019-02-24 RX ADMIN — Medication 20 MILLIGRAM(S): at 05:51

## 2019-02-24 RX ADMIN — Medication 200 MILLIGRAM(S): at 04:45

## 2019-02-24 RX ADMIN — HEPARIN SODIUM 2400 UNIT(S)/HR: 5000 INJECTION INTRAVENOUS; SUBCUTANEOUS at 13:00

## 2019-02-24 RX ADMIN — Medication 50 MILLIGRAM(S): at 05:51

## 2019-02-24 RX ADMIN — Medication 81 MILLIGRAM(S): at 11:33

## 2019-02-24 NOTE — DIETITIAN INITIAL EVALUATION ADULT. - OTHER INFO
Nutrition Consult for morbid obesity received and appreciated. Per chart, "59 yo M with PMHx of obesity who is being seen for dyspnea and aflutter."

## 2019-02-24 NOTE — DIETITIAN INITIAL EVALUATION ADULT. - PHYSICAL APPEARANCE
BMI 63.5Kg/m2 based on standing scale wt 236.8Kg (2/24 post-diuresis). Morbid obesity noted in medical record./well nourished/obese

## 2019-02-24 NOTE — DIETITIAN INITIAL EVALUATION ADULT. - PERTINENT MEDS FT
MEDICATIONS  (STANDING):  aspirin 81 milliGRAM(s) Oral daily  furosemide   Injectable 20 milliGRAM(s) IV Push daily  heparin  Infusion.  Unit(s)/Hr (24 mL/Hr) IV Continuous <Continuous>  influenza   Vaccine 0.5 milliLiter(s) IntraMuscular once  metoprolol tartrate 50 milliGRAM(s) Oral two times a day    MEDICATIONS  (PRN):  benzonatate 200 milliGRAM(s) Oral every 8 hours PRN Cough  heparin  Injectable 02939 Unit(s) IV Push every 6 hours PRN For aPTT less than 40  heparin  Injectable 5000 Unit(s) IV Push every 6 hours PRN For aPTT between 40 - 57

## 2019-02-24 NOTE — DIETITIAN INITIAL EVALUATION ADULT. - NS AS NUTRI INTERV ED CONTENT
Reviewed recommendations for balanced eating and portion control, including availability of supermarket and portioned-meal delivery services. Reviewed impact of soda and fast food on salt, fat, sugar and calorie intake. Pt was very receptive and accepted written diet education: Heart Healthy Eating Nutrition Therapy handout, and USDA My Plate mini poster handout.

## 2019-02-24 NOTE — DIETITIAN INITIAL EVALUATION ADULT. - ADHERENCE
Subjective   HPI Comments: Patient had her wisdom teeth removed over 2 weeks ago.  She still has two holes noted where the teeth were removed.  The dentist she is following with sent her to the ER for diabetic testing due to her delayed wound healing.  The patient is complaining of increased thirst and urination.        History provided by:  Patient      Review of Systems   Constitutional: Negative.  Negative for fever.   HENT: Negative.    Respiratory: Negative.    Cardiovascular: Negative.  Negative for chest pain.   Gastrointestinal: Negative.  Negative for abdominal pain.   Endocrine: Negative.    Genitourinary: Negative.  Negative for dysuria.   Skin: Negative.    Neurological: Negative.    Psychiatric/Behavioral: Negative.    All other systems reviewed and are negative.      History reviewed. No pertinent past medical history.    No Known Allergies    Past Surgical History:   Procedure Laterality Date   • WISDOM TOOTH EXTRACTION  10/16/2017       History reviewed. No pertinent family history.    Social History     Social History   • Marital status: Single     Spouse name: N/A   • Number of children: N/A   • Years of education: N/A     Social History Main Topics   • Smoking status: Never Smoker   • Smokeless tobacco: Never Used   • Alcohol use None   • Drug use: None   • Sexual activity: Not Asked     Other Topics Concern   • None     Social History Narrative   • None           Objective   Physical Exam   Constitutional: She is oriented to person, place, and time. She appears well-developed and well-nourished. No distress.   HENT:   Head: Normocephalic and atraumatic.   Right Ear: External ear normal.   Left Ear: External ear normal.   Nose: Nose normal.   There are still deep holes in the back on her mouth (bottom gum) where her wisdom teeth were removed.     Eyes: Conjunctivae and EOM are normal. Pupils are equal, round, and reactive to light.   Neck: Normal range of motion. Neck supple. No JVD present. No  tracheal deviation present.   Cardiovascular: Normal rate, regular rhythm and normal heart sounds.    No murmur heard.  Pulmonary/Chest: Effort normal and breath sounds normal. No respiratory distress. She has no wheezes.   Abdominal: Soft. Bowel sounds are normal. There is no tenderness.   Musculoskeletal: Normal range of motion. She exhibits no edema or deformity.   Neurological: She is alert and oriented to person, place, and time. No cranial nerve deficit.   Skin: Skin is warm and dry. No rash noted. She is not diaphoretic. No erythema. No pallor.   Psychiatric: She has a normal mood and affect. Her behavior is normal. Thought content normal.   Nursing note and vitals reviewed.      Procedures         ED Course  ED Course                  MDM  Number of Diagnoses or Management Options  Delayed wound healing: minor     Amount and/or Complexity of Data Reviewed  Clinical lab tests: reviewed    Risk of Complications, Morbidity, and/or Mortality  Presenting problems: low  Diagnostic procedures: low  Management options: minimal    Patient Progress  Patient progress: stable      Final diagnoses:   Delayed wound healing            HAIDER Kim  11/03/17 0404       HAIDER Kim  11/19/17 [id number]     Diet recall indicated diet high in fat, sodium, sugar, low in fruits, vegetables, whole grains. Pt enjoys vegetables and healthy meals but reports lifestyle challenges. Pt is receptive to option of food/meal delivery for balanced, portion-controlled meals./poor

## 2019-02-24 NOTE — DIETITIAN INITIAL EVALUATION ADULT. - ENERGY NEEDS
ht: 6 feet 4 inches, wt: 522pounds, BMI: 63.5 Kg/m2, IBW: 202 pounds (+/- 10%), 258% IBW. Edema: 1+ generalized, 2+ left/right leg; Skin: no pressure injuries noted per nursing flowsheet.

## 2019-02-24 NOTE — PROGRESS NOTE ADULT - ASSESSMENT
59 yo M with no significant PMH but has significant FH of PE (brother  of PE, sister had PE), genetic hypercoagulation (nephew), p/w 2 wk, SOB. Continuous, no improvement, cannot lie down, and has worsening b/l leg swelling. Started back pain, since yesterday, sharp, mid/high, 8/10, continuous, some relief by taking aspirin. Denies long travel or trauma. May have sick contact, his house is close to a hospital. Had runny nose and cough x 2-3 wks. Was seen in [facility] and started on Abxs . His symptoms got worse so came here .      Problem/Plan - 1:  ·  Problem: Atrial flutter with FVR .  Plan: Rate control with BB and on AC . Doing well.   Cardiology consult noted. TTE and TFT pending.      Problem/Plan - 2:  ·  Problem:  Viral respiratory infection with persistent cough .  Plan: hmp virus. Supportive care.      Problem/Plan - 3:  ·  Problem: Chest pain.  Plan:  ACS R/O and likely sec to A flutter as well viral infection. TTE pending.      Problem/Plan - 4:  ·  Problem: Dyspnea.  Plan: Sec to Atrial flutter as well viral infection CTA noted. < from: CT Angio Chest w/ IV Cont (19 @ 16:31) >. Will check Doppler legs to R/O DVT. BNP elevated . On Lasix.  Markedly limited study.    No pulmonary embolism in the main, right, and left pulmonary arteries.   Limited evaluation of the lobar, segmental, and subsegmental pulmonary   arteries.      Problem/Plan - 5:  ·  Problem:  INNA.  Plan: Watching BMP . On Lasix.      Problem/Plan - 6:  Problem: Family history of hypercoagulability. Plan: Will need outpt Work up .     Problem/Plan - 7:  ·  Problem:  Obesity.  Plan: Weight loss .

## 2019-02-24 NOTE — DIETITIAN INITIAL EVALUATION ADULT. - ORAL INTAKE PTA
Pt does not do grocery shopping or cooking, relies on takeout and fast food for meals, often skipping meals when at work, relying on snacks and 20 oz bottles of soda. Pt eats large takeout meals every night. Per chart, pt takes multiple herbal supplements, multivitamins and cholecalciferol supplements; reports NKFA./fair

## 2019-02-24 NOTE — DIETITIAN INITIAL EVALUATION ADULT. - FACTORS AFF FOOD INTAKE
Pt enjoys the food in-house, consuming 100% of meal trays, and reports he appreciates the portion control.  Pt denies GI distress, +BM 2/23./none

## 2019-02-24 NOTE — PROGRESS NOTE ADULT - SUBJECTIVE AND OBJECTIVE BOX
Subjective: no chest pain or sob  	    aspirin 81 milliGRAM(s) Oral daily  benzonatate 200 milliGRAM(s) Oral every 8 hours PRN  furosemide   Injectable 20 milliGRAM(s) IV Push daily  heparin  Infusion.  Unit(s)/Hr IV Continuous <Continuous>  heparin  Injectable 64337 Unit(s) IV Push every 6 hours PRN  heparin  Injectable 5000 Unit(s) IV Push every 6 hours PRN  influenza   Vaccine 0.5 milliLiter(s) IntraMuscular once  metoprolol tartrate 50 milliGRAM(s) Oral two times a day                            12.4   5.88  )-----------( 185      ( 23 Feb 2019 11:52 )             39.8       02-24    136  |  99  |  17  ----------------------------<  112<H>  4.2   |  22  |  1.39<H>    Ca    8.8      24 Feb 2019 09:32    TPro  7.4  /  Alb  4.2  /  TBili  0.6  /  DBili  x   /  AST  45<H>  /  ALT  52<H>  /  AlkPhos  70  02-22            T(C): 36.7 (02-24-19 @ 05:24), Max: 37 (02-23-19 @ 14:02)  HR: 115 (02-24-19 @ 05:24) (106 - 128)  BP: 107/68 (02-24-19 @ 05:24) (107/68 - 155/73)  RR: 18 (02-24-19 @ 05:24) (18 - 18)  SpO2: 96% (02-24-19 @ 05:24) (94% - 99%)  Wt(kg): --    I&O's Summary    23 Feb 2019 07:01  -  24 Feb 2019 07:00  --------------------------------------------------------  IN: 1362 mL / OUT: 650 mL / NET: 712 mL      	  Lymphatic: + edema in LE bilaterally   Cardiovascular: Normal S1 S2,IRRR No murmurs , Peripheral pulses palpable 2+ bilaterally  Respiratory: Lungs clear to auscultation, normal effort 	  Gastrointestinal:  Soft, Non-tender, + BS	  Skin: No rashes, No ecchymoses, No cyanosis, warm to touch      TELEMETRY: AFl 100-120   ECG:  	  RADIOLOGY:   DIAGNOSTIC TESTING:  [ ] Echocardiogram:  [ ]  Catheterization:  [ ] Stress Test:    OTHER: 	      ASSESSMENT/PLAN: 57 yo M with PMHx of obesity who is being seen for dyspnea and aflutter  -continue with hep gtt if no contraindications  -continue with IV diuresis for LE edema  -check TTE  -further workup pending above including ischemic evaluation pending above  -possible flutter ablation per EP    Puneet Vidal MD

## 2019-02-24 NOTE — PROGRESS NOTE ADULT - SUBJECTIVE AND OBJECTIVE BOX
Subjective:  pt seen and examined, no complaints, ROS - .     No chest pain , or sob on exam     aspirin 81 milliGRAM(s) Oral daily  benzonatate 200 milliGRAM(s) Oral every 8 hours PRN  furosemide   Injectable 20 milliGRAM(s) IV Push daily  heparin  Infusion.  Unit(s)/Hr IV Continuous <Continuous>  heparin  Injectable 83019 Unit(s) IV Push every 6 hours PRN  heparin  Injectable 5000 Unit(s) IV Push every 6 hours PRN  influenza   Vaccine 0.5 milliLiter(s) IntraMuscular once  metoprolol tartrate 50 milliGRAM(s) Oral two times a day                            12.4   5.88  )-----------( 185      ( 23 Feb 2019 11:52 )             39.8       Hemoglobin: 12.4 g/dL (02-23 @ 11:52)  Hemoglobin: 13.9 g/dL (02-22 @ 14:29)      02-23    138  |  102  |  15  ----------------------------<  119<H>  4.6   |  22  |  1.24    Ca    9.2      23 Feb 2019 08:51    TPro  7.4  /  Alb  4.2  /  TBili  0.6  /  DBili  x   /  AST  45<H>  /  ALT  52<H>  /  AlkPhos  70  02-22    Creatinine Trend: 1.24<--, 1.31<--    COAGS:           T(C): 36.7 (02-23-19 @ 20:33), Max: 37 (02-23-19 @ 14:02)  HR: 128 (02-23-19 @ 20:33) (106 - 128)  BP: 134/64 (02-23-19 @ 20:33) (116/70 - 155/73)  RR: 18 (02-23-19 @ 20:33) (18 - 18)  SpO2: 96% (02-23-19 @ 20:33) (94% - 99%)  Wt(kg): --    I&O's Summary    23 Feb 2019 07:01  -  24 Feb 2019 05:24  --------------------------------------------------------  IN: 1074 mL / OUT: 0 mL / NET: 1074 mL        Lymphatic: No lymphadenopathy , + edema in LE bilaterally   Cardiovascular: Normal S1 S2, IRRR No murmurs , Peripheral pulses palpable 2+ bilaterally  Respiratory: Lungs clear to auscultation, normal effort 	  Gastrointestinal:  Soft, Non-tender, + BS	  Skin: + erythema in LE bilaterally   Musculoskeletal: Normal range of motion, normal strength  Psychiatry:  Mood & affect appropriate      TELEMETRY: 	Aflutter     ECG:  	Aflutter, 92 , no acute Ischemia changes noted   RADIOLOGY: < from: CT Angio Chest w/ IV Cont (02.22.19 @ 16:31) >    IMPRESSION:     Markedly limited study.    No pulmonary embolism in the main, right, and left pulmonary arteries.   Limited evaluation of the lobar, segmental, and subsegmental pulmonary   arteries.              ASSESSMENT/PLAN: 58 yr male , no PHX , but family hx of PE , now SOB , CHF, Palpitation . Found to be in Aflutter , +RVP     A/C with heparin gtt ,   ASA, statin   Rate controlled with Lopressor at present   ECHO pending ,   would start low dose diuretics   Cardiac markers neg   pending echo results will discuss his two options of 1) JOHN/DCCV or 2) JOHN/AFL ablation .  D/W Dr Heard

## 2019-02-24 NOTE — PROGRESS NOTE ADULT - SUBJECTIVE AND OBJECTIVE BOX
INTERVAL HPI/OVERNIGHT EVENTS: I am waiting for EP to see me . Need something for cough but can not take syrup.   Vital Signs Last 24 Hrs  T(C): 37 (24 Feb 2019 14:02), Max: 37 (24 Feb 2019 14:02)  T(F): 98.6 (24 Feb 2019 14:02), Max: 98.6 (24 Feb 2019 14:02)  HR: 113 (24 Feb 2019 14:02) (112 - 128)  BP: 129/76 (24 Feb 2019 14:02) (107/68 - 155/73)  BP(mean): --  RR: 18 (24 Feb 2019 14:02) (18 - 18)  SpO2: 94% (24 Feb 2019 14:02) (94% - 99%)  I&O's Summary    23 Feb 2019 07:01  -  24 Feb 2019 07:00  --------------------------------------------------------  IN: 1362 mL / OUT: 650 mL / NET: 712 mL      MEDICATIONS  (STANDING):  aspirin 81 milliGRAM(s) Oral daily  furosemide   Injectable 20 milliGRAM(s) IV Push daily  heparin  Infusion.  Unit(s)/Hr (24 mL/Hr) IV Continuous <Continuous>  influenza   Vaccine 0.5 milliLiter(s) IntraMuscular once  metoprolol tartrate 50 milliGRAM(s) Oral two times a day    MEDICATIONS  (PRN):  benzonatate 200 milliGRAM(s) Oral every 8 hours PRN Cough  heparin  Injectable 22714 Unit(s) IV Push every 6 hours PRN For aPTT less than 40  heparin  Injectable 5000 Unit(s) IV Push every 6 hours PRN For aPTT between 40 - 57    LABS:                        12.4   6.47  )-----------( 167      ( 24 Feb 2019 12:56 )             39.7     02-24    136  |  99  |  17  ----------------------------<  112<H>  4.2   |  22  |  1.39<H>    Ca    8.8      24 Feb 2019 09:32      PTT - ( 24 Feb 2019 11:31 )  PTT:83.4 sec    CAPILLARY BLOOD GLUCOSE              REVIEW OF SYSTEMS:  CONSTITUTIONAL: No fever, weight loss, or fatigue  EYES: No eye pain, visual disturbances, or discharge  ENMT:  No difficulty hearing, tinnitus, vertigo; No sinus or throat pain  NECK: No pain or stiffness  RESPIRATORY:  cough, shortness of breath  CARDIOVASCULAR: No chest pain, palpitations, dizziness, or leg swelling  GASTROINTESTINAL: No abdominal or epigastric pain. No nausea, vomiting, or hematemesis; No diarrhea or constipation. No melena or hematochezia.  GENITOURINARY: No dysuria, frequency, hematuria, or incontinence  NEUROLOGICAL: No headaches, memory loss, loss of strength, numbness, or tremors    RADIOLOGY & ADDITIONAL TESTS:    Consultant(s) Notes Reviewed:  [x ] YES  [ ] NO    PHYSICAL EXAM:  GENERAL: NAD, Obese ,not in any distress ,  HEAD:  Atraumatic, Normocephalic  NECK: Supple, No JVD, Normal thyroid  NERVOUS SYSTEM:  Alert & Oriented X3, No focal deficit   CHEST/LUNG: Good air entry bilateral with no  rales, rhonchi, wheezing, or rubs  HEART: Irregular rate and rhythm; No murmurs, rubs, or gallops  ABDOMEN: Soft, Nontender, Nondistended; Bowel sounds present  EXTREMITIES:  2+ Peripheral Pulses, No clubbing, cyanosis, but +edema    Care Discussed with Consultants/Other Providers [ x] YES  [ ] NO

## 2019-02-25 LAB
ANION GAP SERPL CALC-SCNC: 13 MMOL/L — SIGNIFICANT CHANGE UP (ref 5–17)
APTT BLD: 100.9 SEC — HIGH (ref 27.5–36.3)
APTT BLD: 124.4 SEC — CRITICAL HIGH (ref 27.5–36.3)
BUN SERPL-MCNC: 17 MG/DL — SIGNIFICANT CHANGE UP (ref 7–23)
CALCIUM SERPL-MCNC: 8.7 MG/DL — SIGNIFICANT CHANGE UP (ref 8.4–10.5)
CHLORIDE SERPL-SCNC: 99 MMOL/L — SIGNIFICANT CHANGE UP (ref 96–108)
CO2 SERPL-SCNC: 23 MMOL/L — SIGNIFICANT CHANGE UP (ref 22–31)
CREAT SERPL-MCNC: 1.33 MG/DL — HIGH (ref 0.5–1.3)
GLUCOSE SERPL-MCNC: 102 MG/DL — HIGH (ref 70–99)
HCT VFR BLD CALC: 39.3 % — SIGNIFICANT CHANGE UP (ref 39–50)
HGB BLD-MCNC: 12.2 G/DL — LOW (ref 13–17)
MCHC RBC-ENTMCNC: 28.8 PG — SIGNIFICANT CHANGE UP (ref 27–34)
MCHC RBC-ENTMCNC: 31 GM/DL — LOW (ref 32–36)
MCV RBC AUTO: 92.7 FL — SIGNIFICANT CHANGE UP (ref 80–100)
PLATELET # BLD AUTO: 176 K/UL — SIGNIFICANT CHANGE UP (ref 150–400)
POTASSIUM SERPL-MCNC: 3.9 MMOL/L — SIGNIFICANT CHANGE UP (ref 3.5–5.3)
POTASSIUM SERPL-SCNC: 3.9 MMOL/L — SIGNIFICANT CHANGE UP (ref 3.5–5.3)
RBC # BLD: 4.24 M/UL — SIGNIFICANT CHANGE UP (ref 4.2–5.8)
RBC # FLD: 14.2 % — SIGNIFICANT CHANGE UP (ref 10.3–14.5)
SODIUM SERPL-SCNC: 135 MMOL/L — SIGNIFICANT CHANGE UP (ref 135–145)
WBC # BLD: 7.55 K/UL — SIGNIFICANT CHANGE UP (ref 3.8–10.5)
WBC # FLD AUTO: 7.55 K/UL — SIGNIFICANT CHANGE UP (ref 3.8–10.5)

## 2019-02-25 PROCEDURE — 93970 EXTREMITY STUDY: CPT | Mod: 26

## 2019-02-25 RX ADMIN — HEPARIN SODIUM 1800 UNIT(S)/HR: 5000 INJECTION INTRAVENOUS; SUBCUTANEOUS at 19:27

## 2019-02-25 RX ADMIN — Medication 50 MILLIGRAM(S): at 17:11

## 2019-02-25 RX ADMIN — HEPARIN SODIUM 2100 UNIT(S)/HR: 5000 INJECTION INTRAVENOUS; SUBCUTANEOUS at 11:40

## 2019-02-25 RX ADMIN — Medication 50 MILLIGRAM(S): at 06:17

## 2019-02-25 RX ADMIN — Medication 20 MILLIGRAM(S): at 06:17

## 2019-02-25 RX ADMIN — Medication 600 MILLIGRAM(S): at 17:11

## 2019-02-25 RX ADMIN — Medication 600 MILLIGRAM(S): at 06:17

## 2019-02-25 RX ADMIN — Medication 81 MILLIGRAM(S): at 11:39

## 2019-02-25 NOTE — PROGRESS NOTE ADULT - ASSESSMENT
59 yo M with no significant PMH but has significant FH of PE (brother  of PE, sister had PE), genetic hypercoagulation (nephew), p/w 2 wk, SOB. Continuous, no improvement, cannot lie down, and has worsening b/l leg swelling. Started back pain, since yesterday, sharp, mid/high, 8/10, continuous, some relief by taking aspirin. Denies long travel or trauma. May have sick contact, his house is close to a hospital. Had runny nose and cough x 2-3 wks. Was seen in [facility] and started on Abxs . His symptoms got worse so came here .      Problem/Plan - 1:  ·  Problem: Atrial flutter with FVR .  Plan: Rate control with BB and on AC . Doing well.   Cardiology consult noted. TTE and TFT pending.      Problem/Plan - 2:  ·  Problem:  Viral respiratory infection with persistent cough .  Plan: hmp virus. Supportive care. Cough improving.      Problem/Plan - 3:  ·  Problem: Chest pain.  Plan:  ACS R/O and likely sec to A flutter as well viral infection. TTE pending.      Problem/Plan - 4:  ·  Problem: Dyspnea.  Plan: Sec to Atrial flutter as well viral infection CTA noted. < from: CT Angio Chest w/ IV Cont (19 @ 16:31) >. Will check Doppler legs to R/O DVT. BNP elevated . On Lasix.  Markedly limited study.    No pulmonary embolism in the main, right, and left pulmonary arteries.   Limited evaluation of the lobar, segmental, and subsegmental pulmonary   arteries.      Problem/Plan - 5:  ·  Problem:  INNA.  Plan: Watching BMP . On Lasix.      Problem/Plan - 6:  Problem: Family history of hypercoagulability. Plan: Will need outpt Work up .     Problem/Plan - 7:  ·  Problem:  Obesity.  Plan: Weight loss .

## 2019-02-25 NOTE — PROGRESS NOTE ADULT - SUBJECTIVE AND OBJECTIVE BOX
INTERVAL HPI/OVERNIGHT EVENTS: I feel fine as cough now better.   Vital Signs Last 24 Hrs  T(C): 36.9 (25 Feb 2019 05:13), Max: 37.1 (24 Feb 2019 20:55)  T(F): 98.5 (25 Feb 2019 05:13), Max: 98.8 (24 Feb 2019 20:55)  HR: 99 (25 Feb 2019 05:13) (99 - 113)  BP: 109/72 (25 Feb 2019 05:13) (109/72 - 129/91)  BP(mean): --  RR: 18 (25 Feb 2019 05:13) (18 - 18)  SpO2: 95% (25 Feb 2019 05:13) (94% - 95%)  I&O's Summary    24 Feb 2019 07:01  -  25 Feb 2019 07:00  --------------------------------------------------------  IN: 480 mL / OUT: 250 mL / NET: 230 mL    25 Feb 2019 07:01  -  25 Feb 2019 13:13  --------------------------------------------------------  IN: 360 mL / OUT: 0 mL / NET: 360 mL      MEDICATIONS  (STANDING):  aspirin 81 milliGRAM(s) Oral daily  furosemide   Injectable 20 milliGRAM(s) IV Push daily  guaiFENesin  milliGRAM(s) Oral every 12 hours  heparin  Infusion.  Unit(s)/Hr (24 mL/Hr) IV Continuous <Continuous>  influenza   Vaccine 0.5 milliLiter(s) IntraMuscular once  metoprolol tartrate 50 milliGRAM(s) Oral two times a day    MEDICATIONS  (PRN):  benzonatate 200 milliGRAM(s) Oral every 8 hours PRN Cough  heparin  Injectable 53702 Unit(s) IV Push every 6 hours PRN For aPTT less than 40  heparin  Injectable 5000 Unit(s) IV Push every 6 hours PRN For aPTT between 40 - 57    LABS:                        12.2   7.55  )-----------( 176      ( 25 Feb 2019 08:08 )             39.3     02-25    135  |  99  |  17  ----------------------------<  102<H>  3.9   |  23  |  1.33<H>    Ca    8.7      25 Feb 2019 06:11      PTT - ( 25 Feb 2019 09:55 )  PTT:100.9 sec    CAPILLARY BLOOD GLUCOSE              REVIEW OF SYSTEMS:  CONSTITUTIONAL: No fever, weight loss, or fatigue  EYES: No eye pain, visual disturbances, or discharge  ENMT:  No difficulty hearing, tinnitus, vertigo; No sinus or throat pain  RESPIRATORY: No cough, wheezing, chills or hemoptysis; No shortness of breath  CARDIOVASCULAR: No chest pain, palpitations, dizziness, or leg swelling  GASTROINTESTINAL: No abdominal or epigastric pain. No nausea, vomiting, or hematemesis; No diarrhea or constipation. No melena or hematochezia.  GENITOURINARY: No dysuria, frequency, hematuria, or incontinence  NEUROLOGICAL: No headaches, memory loss, loss of strength, numbness, or tremors    Consultant(s) Notes Reviewed:  [x ] YES  [ ] NO    PHYSICAL EXAM:  GENERAL: NAD, well-groomed, well-developed, not in any distress ,  HEAD:  Atraumatic, Normocephalic  EYES: EOMI, PERRLA, conjunctiva and sclera clear  ENMT: No tonsillar erythema, exudates, or enlargement; Moist mucous membranes, Good dentition, No lesions  NECK: Supple, No JVD, Normal thyroid  NERVOUS SYSTEM:  Alert & Oriented X3, No focal deficit   CHEST/LUNG: Good air entry bilateral with no  rales, rhonchi, wheezing, or rubs  HEART: Irregular rate and rhythm; No murmurs, rubs, or gallops  ABDOMEN: Soft, Nontender, Nondistended; Bowel sounds present  EXTREMITIES:  2+ Peripheral Pulses, No clubbing, cyanosis, or edema    Care Discussed with Consultants/Other Providers [ x] YES  [ ] NO

## 2019-02-25 NOTE — PROGRESS NOTE ADULT - SUBJECTIVE AND OBJECTIVE BOX
EP ATTENDING    tele: typical AFL    + palpitations, + dyspnea, no angina, no syncope    aspirin 81 milliGRAM(s) Oral daily  benzonatate 200 milliGRAM(s) Oral every 8 hours PRN  furosemide   Injectable 20 milliGRAM(s) IV Push daily  guaiFENesin  milliGRAM(s) Oral every 12 hours  heparin  Infusion.  Unit(s)/Hr IV Continuous <Continuous>  heparin  Injectable 04721 Unit(s) IV Push every 6 hours PRN  heparin  Injectable 5000 Unit(s) IV Push every 6 hours PRN  influenza   Vaccine 0.5 milliLiter(s) IntraMuscular once  metoprolol tartrate 50 milliGRAM(s) Oral two times a day                            12.2   7.55  )-----------( 176      ( 25 Feb 2019 08:08 )             39.3       02-25    135  |  99  |  17  ----------------------------<  102<H>  3.9   |  23  |  1.33<H>    Ca    8.7      25 Feb 2019 06:11        T(C): 36.9 (02-25-19 @ 05:13), Max: 37.1 (02-24-19 @ 20:55)  HR: 99 (02-25-19 @ 05:13) (99 - 113)  BP: 109/72 (02-25-19 @ 05:13) (109/72 - 129/91)  RR: 18 (02-25-19 @ 05:13) (18 - 18)  SpO2: 95% (02-25-19 @ 05:13) (94% - 95%)  Wt(kg): --    JVP 8  IRR, no murmurs  CTAB  soft nt/nd  no c/c/e    echo pending  TSH pending  a1c pending    A/P) 57 y/o male PMH BERNARDINO admitted with 1 month of CHF type symptoms, and found to have typical AFL    -continue heparin drip until chads-vasc established  -check TSH, check a1c  -f/u echo as I suspect an underlying systolic cardiomyopathy  -if his echo is normal I discussed the R/B/A of JOHN-DCCV or JOHN-ablation, and he is leaning towards ablation  -final EP reccs/scheduling pending echo

## 2019-02-25 NOTE — PROGRESS NOTE ADULT - SUBJECTIVE AND OBJECTIVE BOX
pt seen and examined, no complaints, ROS - .     No chest pain     aspirin 81 milliGRAM(s) Oral daily  benzonatate 200 milliGRAM(s) Oral every 8 hours PRN  furosemide   Injectable 20 milliGRAM(s) IV Push daily  guaiFENesin  milliGRAM(s) Oral every 12 hours  heparin  Infusion.  Unit(s)/Hr IV Continuous <Continuous>  heparin  Injectable 61217 Unit(s) IV Push every 6 hours PRN  heparin  Injectable 5000 Unit(s) IV Push every 6 hours PRN  influenza   Vaccine 0.5 milliLiter(s) IntraMuscular once  metoprolol tartrate 50 milliGRAM(s) Oral two times a day                            12.4   6.47  )-----------( 167      ( 24 Feb 2019 12:56 )             39.7       Hemoglobin: 12.4 g/dL (02-24 @ 12:56)  Hemoglobin: 12.4 g/dL (02-23 @ 11:52)  Hemoglobin: 13.9 g/dL (02-22 @ 14:29)      02-24    136  |  99  |  17  ----------------------------<  112<H>  4.2   |  22  |  1.39<H>    Ca    8.8      24 Feb 2019 09:32      Creatinine Trend: 1.39<--, 1.24<--, 1.31<--    COAGS: PTT - ( 24 Feb 2019 11:31 )  PTT:83.4 sec          T(C): 36.9 (02-25-19 @ 05:13), Max: 37.1 (02-24-19 @ 20:55)  HR: 99 (02-25-19 @ 05:13) (99 - 113)  BP: 109/72 (02-25-19 @ 05:13) (109/72 - 129/91)  RR: 18 (02-25-19 @ 05:13) (18 - 18)  SpO2: 95% (02-25-19 @ 05:13) (94% - 95%)  Wt(kg): --    I&O's Summary    23 Feb 2019 07:01  -  24 Feb 2019 07:00  --------------------------------------------------------  IN: 1362 mL / OUT: 650 mL / NET: 712 mL    24 Feb 2019 07:01  -  25 Feb 2019 05:33  --------------------------------------------------------  IN: 480 mL / OUT: 250 mL / NET: 230 mL        Lymphatic: No lymphadenopathy , + edema in LE bilaterally   Cardiovascular: Normal S1 S2, IRRR No murmurs , Peripheral pulses palpable 2+ bilaterally  Respiratory: Lungs clear to auscultation, normal effort 	  Gastrointestinal:  Soft, Non-tender, + BS	  Skin: + erythema in LE bilaterally   Musculoskeletal: Normal range of motion, normal strength  Psychiatry:  Mood & affect appropriate      TELEMETRY: 	Aflutter     ECG:  	Aflutter, 92 , no acute Ischemia changes noted   RADIOLOGY: < from: CT Angio Chest w/ IV Cont (02.22.19 @ 16:31) >    IMPRESSION:     Markedly limited study.    No pulmonary embolism in the main, right, and left pulmonary arteries.   Limited evaluation of the lobar, segmental, and subsegmental pulmonary   arteries.              ASSESSMENT/PLAN: 58 yr male , no PHX , but family hx of PE , now SOB , CHF, Palpitation . Found to be in Aflutter , +RVP     A/C with heparin gtt ,   ASA, statin   keep net neg with IV lasix daily   Rate controlled with Lopressor at present   ECHO pending ,   pending echo results will discuss his two options of 1) JOHN/DCCV or 2) JOHN/AFL ablation .  D/W Dr Heard

## 2019-02-26 LAB
ANION GAP SERPL CALC-SCNC: 15 MMOL/L — SIGNIFICANT CHANGE UP (ref 5–17)
APTT BLD: 110.5 SEC — HIGH (ref 27.5–36.3)
APTT BLD: 56.9 SEC — HIGH (ref 27.5–36.3)
APTT BLD: 92.6 SEC — HIGH (ref 27.5–36.3)
BUN SERPL-MCNC: 17 MG/DL — SIGNIFICANT CHANGE UP (ref 7–23)
CALCIUM SERPL-MCNC: 9.1 MG/DL — SIGNIFICANT CHANGE UP (ref 8.4–10.5)
CHLORIDE SERPL-SCNC: 100 MMOL/L — SIGNIFICANT CHANGE UP (ref 96–108)
CO2 SERPL-SCNC: 23 MMOL/L — SIGNIFICANT CHANGE UP (ref 22–31)
CREAT SERPL-MCNC: 1.33 MG/DL — HIGH (ref 0.5–1.3)
GLUCOSE SERPL-MCNC: 136 MG/DL — HIGH (ref 70–99)
HBA1C BLD-MCNC: 6.3 % — HIGH (ref 4–5.6)
HCT VFR BLD CALC: 40.8 % — SIGNIFICANT CHANGE UP (ref 39–50)
HGB BLD-MCNC: 12.9 G/DL — LOW (ref 13–17)
MCHC RBC-ENTMCNC: 29.3 PG — SIGNIFICANT CHANGE UP (ref 27–34)
MCHC RBC-ENTMCNC: 31.6 GM/DL — LOW (ref 32–36)
MCV RBC AUTO: 92.7 FL — SIGNIFICANT CHANGE UP (ref 80–100)
PLATELET # BLD AUTO: 158 K/UL — SIGNIFICANT CHANGE UP (ref 150–400)
POTASSIUM SERPL-MCNC: 4.6 MMOL/L — SIGNIFICANT CHANGE UP (ref 3.5–5.3)
POTASSIUM SERPL-SCNC: 4.6 MMOL/L — SIGNIFICANT CHANGE UP (ref 3.5–5.3)
RBC # BLD: 4.4 M/UL — SIGNIFICANT CHANGE UP (ref 4.2–5.8)
RBC # FLD: 13.9 % — SIGNIFICANT CHANGE UP (ref 10.3–14.5)
SODIUM SERPL-SCNC: 138 MMOL/L — SIGNIFICANT CHANGE UP (ref 135–145)
TSH SERPL-MCNC: 6.1 UIU/ML — HIGH (ref 0.27–4.2)
WBC # BLD: 6.13 K/UL — SIGNIFICANT CHANGE UP (ref 3.8–10.5)
WBC # FLD AUTO: 6.13 K/UL — SIGNIFICANT CHANGE UP (ref 3.8–10.5)

## 2019-02-26 RX ORDER — TIOTROPIUM BROMIDE 18 UG/1
1 CAPSULE ORAL; RESPIRATORY (INHALATION) DAILY
Refills: 0 | Status: DISCONTINUED | OUTPATIENT
Start: 2019-02-26 | End: 2019-03-01

## 2019-02-26 RX ORDER — LEVALBUTEROL 1.25 MG/.5ML
0.63 SOLUTION, CONCENTRATE RESPIRATORY (INHALATION) EVERY 6 HOURS
Refills: 0 | Status: DISCONTINUED | OUTPATIENT
Start: 2019-02-26 | End: 2019-02-27

## 2019-02-26 RX ADMIN — Medication 600 MILLIGRAM(S): at 17:13

## 2019-02-26 RX ADMIN — HEPARIN SODIUM 1500 UNIT(S)/HR: 5000 INJECTION INTRAVENOUS; SUBCUTANEOUS at 02:10

## 2019-02-26 RX ADMIN — Medication 50 MILLIGRAM(S): at 17:13

## 2019-02-26 RX ADMIN — Medication 50 MILLIGRAM(S): at 05:07

## 2019-02-26 RX ADMIN — Medication 600 MILLIGRAM(S): at 05:07

## 2019-02-26 RX ADMIN — Medication 20 MILLIGRAM(S): at 05:07

## 2019-02-26 RX ADMIN — Medication 81 MILLIGRAM(S): at 11:07

## 2019-02-26 RX ADMIN — HEPARIN SODIUM 1800 UNIT(S)/HR: 5000 INJECTION INTRAVENOUS; SUBCUTANEOUS at 18:59

## 2019-02-26 RX ADMIN — HEPARIN SODIUM 5000 UNIT(S): 5000 INJECTION INTRAVENOUS; SUBCUTANEOUS at 11:10

## 2019-02-26 RX ADMIN — HEPARIN SODIUM 1800 UNIT(S)/HR: 5000 INJECTION INTRAVENOUS; SUBCUTANEOUS at 11:08

## 2019-02-26 NOTE — PROGRESS NOTE ADULT - SUBJECTIVE AND OBJECTIVE BOX
pt seen and examined, no complaints    aspirin 81 milliGRAM(s) Oral daily  benzonatate 200 milliGRAM(s) Oral every 8 hours PRN  furosemide   Injectable 20 milliGRAM(s) IV Push daily  guaiFENesin  milliGRAM(s) Oral every 12 hours  heparin  Infusion.  Unit(s)/Hr IV Continuous <Continuous>  heparin  Injectable 33523 Unit(s) IV Push every 6 hours PRN  heparin  Injectable 5000 Unit(s) IV Push every 6 hours PRN  influenza   Vaccine 0.5 milliLiter(s) IntraMuscular once  metoprolol tartrate 50 milliGRAM(s) Oral two times a day                            12.2   7.55  )-----------( 176      ( 25 Feb 2019 08:08 )             39.3       Hemoglobin: 12.2 g/dL (02-25 @ 08:08)  Hemoglobin: 12.4 g/dL (02-24 @ 12:56)  Hemoglobin: 12.4 g/dL (02-23 @ 11:52)  Hemoglobin: 13.9 g/dL (02-22 @ 14:29)      02-25    135  |  99  |  17  ----------------------------<  102<H>  3.9   |  23  |  1.33<H>    Ca    8.7      25 Feb 2019 06:11      Creatinine Trend: 1.33<--, 1.39<--, 1.24<--, 1.31<--    COAGS: PTT - ( 26 Feb 2019 01:23 )  PTT:110.5 sec          T(C): 36.5 (02-26-19 @ 05:05), Max: 36.5 (02-25-19 @ 14:15)  HR: 99 (02-26-19 @ 05:05) (95 - 108)  BP: 120/80 (02-26-19 @ 05:05) (120/80 - 132/74)  RR: 18 (02-26-19 @ 05:05) (16 - 18)  SpO2: 96% (02-26-19 @ 05:05) (95% - 96%)  Wt(kg): --    I&O's Summary    25 Feb 2019 07:01  -  26 Feb 2019 07:00  --------------------------------------------------------  IN: 1260 mL / OUT: 0 mL / NET: 1260 mL           Gastrointestinal:  Soft, Non-tender, + BS	  Skin: + erythema in LE bilaterally   Musculoskeletal: Normal range of motion, normal strength  Psychiatry:  Mood & affect appropriate      TELEMETRY: 	Aflutter     ECG:  	Aflutter, 92 , no acute Ischemia changes noted   RADIOLOGY: < from: CT Angio Chest w/ IV Cont (02.22.19 @ 16:31) >    IMPRESSION:     Markedly limited study.    No pulmonary embolism in the main, right, and left pulmonary arteries.   Limited evaluation of the lobar, segmental, and subsegmental pulmonary   arteries.              ASSESSMENT/PLAN: 58 yr male , no PHX , but family hx of PE , now SOB , CHF, Palpitation . Found to be in Aflutter , +RVP     A/C with heparin gtt ,   ASA, statin   Cont IV lasix , diuresing well ,   monitor Lytes   Rate controlled with Lopressor at present   ECHO pending ,   pending echo results will discuss his two options of 1) JOHN/DCCV or 2) JOHN/AFL ablation .  D/W Dr Heard

## 2019-02-26 NOTE — PROGRESS NOTE ADULT - SUBJECTIVE AND OBJECTIVE BOX
INTERVAL HPI/OVERNIGHT EVENTS: I feel okay .   Vital Signs Last 24 Hrs  T(C): 36.5 (26 Feb 2019 05:05), Max: 36.5 (25 Feb 2019 14:15)  T(F): 97.7 (26 Feb 2019 05:05), Max: 97.7 (25 Feb 2019 14:15)  HR: 99 (26 Feb 2019 05:05) (95 - 108)  BP: 120/80 (26 Feb 2019 05:05) (120/80 - 132/74)  BP(mean): --  RR: 18 (26 Feb 2019 05:05) (16 - 18)  SpO2: 96% (26 Feb 2019 05:05) (95% - 96%)  I&O's Summary    25 Feb 2019 07:01  -  26 Feb 2019 07:00  --------------------------------------------------------  IN: 1260 mL / OUT: 0 mL / NET: 1260 mL      MEDICATIONS  (STANDING):  aspirin 81 milliGRAM(s) Oral daily  furosemide   Injectable 20 milliGRAM(s) IV Push daily  guaiFENesin  milliGRAM(s) Oral every 12 hours  heparin  Infusion.  Unit(s)/Hr (24 mL/Hr) IV Continuous <Continuous>  influenza   Vaccine 0.5 milliLiter(s) IntraMuscular once  metoprolol tartrate 50 milliGRAM(s) Oral two times a day    MEDICATIONS  (PRN):  benzonatate 200 milliGRAM(s) Oral every 8 hours PRN Cough  heparin  Injectable 30502 Unit(s) IV Push every 6 hours PRN For aPTT less than 40  heparin  Injectable 5000 Unit(s) IV Push every 6 hours PRN For aPTT between 40 - 57    LABS:                        12.2   7.55  )-----------( 176      ( 25 Feb 2019 08:08 )             39.3     02-26    138  |  100  |  17  ----------------------------<  136<H>  4.6   |  23  |  1.33<H>    Ca    9.1      26 Feb 2019 09:15      PTT - ( 26 Feb 2019 09:15 )  PTT:56.9 sec    CAPILLARY BLOOD GLUCOSE              REVIEW OF SYSTEMS:  CONSTITUTIONAL: No fever, weight loss, or fatigue  EYES: No eye pain, visual disturbances, or discharge  ENMT:  No difficulty hearing, tinnitus, vertigo; No sinus or throat pain  RESPIRATORY:  cough, wheezing shortness of breath  CARDIOVASCULAR: No chest pain, palpitations, dizziness, or leg swelling  GASTROINTESTINAL: No abdominal or epigastric pain. No nausea, vomiting, or hematemesis; No diarrhea or constipation. No melena or hematochezia.  GENITOURINARY: No dysuria, frequency, hematuria, or incontinence    Consultant(s) Notes Reviewed:  [x ] YES  [ ] NO    PHYSICAL EXAM:  GENERAL: NAD, well-groomed, well-developed,not in any distress ,  HEAD:  Atraumatic, Normocephalic  EYES: EOMI, PERRLA, conjunctiva and sclera clear  ENMT: No tonsillar erythema, exudates, or enlargement; Moist mucous membranes, Good dentition, No lesions  NECK: Supple, No JVD, Normal thyroid  NERVOUS SYSTEM:  Alert & Oriented X3, No focal deficit   CHEST/LUNG: Good air entry bilateral with no  rales, rhonchi, wheezing, or rubs  HEART: Irregular rate and rhythm; No murmurs, rubs, or gallops  ABDOMEN: Soft, Nontender, Nondistended; Bowel sounds present  EXTREMITIES:  2+ Peripheral Pulses, No clubbing, cyanosis, or edema    Care Discussed with Consultants/Other Providers [ x] YES  [ ] NO

## 2019-02-26 NOTE — PROGRESS NOTE ADULT - SUBJECTIVE AND OBJECTIVE BOX
EP ATTENDING    tele: typical AFL    + palpitations, + dyspnea, no angina, no syncope    aspirin 81 milliGRAM(s) Oral daily  benzonatate 200 milliGRAM(s) Oral every 8 hours PRN  furosemide   Injectable 20 milliGRAM(s) IV Push daily  guaiFENesin  milliGRAM(s) Oral every 12 hours  heparin  Infusion.  Unit(s)/Hr IV Continuous <Continuous>  heparin  Injectable 47675 Unit(s) IV Push every 6 hours PRN  heparin  Injectable 5000 Unit(s) IV Push every 6 hours PRN  influenza   Vaccine 0.5 milliLiter(s) IntraMuscular once  metoprolol tartrate 50 milliGRAM(s) Oral two times a day                            12.2   7.55  )-----------( 176      ( 25 Feb 2019 08:08 )             39.3       02-25    135  |  99  |  17  ----------------------------<  102<H>  3.9   |  23  |  1.33<H>    Ca    8.7      25 Feb 2019 06:11        T(C): 36.5 (02-26-19 @ 05:05), Max: 36.5 (02-25-19 @ 14:15)  HR: 99 (02-26-19 @ 05:05) (95 - 108)  BP: 120/80 (02-26-19 @ 05:05) (120/80 - 132/74)  RR: 18 (02-26-19 @ 05:05) (16 - 18)  SpO2: 96% (02-26-19 @ 05:05) (95% - 96%)  Wt(kg): --    JVP 8  IRR, no murmurs  CTAB  soft nt/nd  no c/c/e    echo pending  TSH pending  a1c pending    A/P) 59 y/o male PMH BERNARDINO admitted with 1 month of CHF type symptoms, and found to have typical AFL    -continue heparin drip until chads-vasc established  -check TSH, check a1c  -f/u echo as I suspect an underlying systolic cardiomyopathy  -if his echo is normal I discussed the R/B/A of JOHN-DCCV or JOHN-ablation, and he elected ablation  -if echo normal then ablation tomorrow, home Thursday  -if echo shows low LVEF then cath tomorrow, ablation Friday, and home Saturday  -NPO after midnight tonight

## 2019-02-26 NOTE — CHART NOTE - NSCHARTNOTEFT_GEN_A_CORE
Notified by RN PTT = 124. No signs of bleeding noted. VS stable. Follow heparin normogram.    Will continue to monitor   Will endorse to day team     Laly Burt PA-C   [id number]

## 2019-02-27 LAB
ANION GAP SERPL CALC-SCNC: 13 MMOL/L — SIGNIFICANT CHANGE UP (ref 5–17)
APTT BLD: 40.6 SEC — HIGH (ref 27.5–36.3)
APTT BLD: 71 SEC — HIGH (ref 27.5–36.3)
APTT BLD: 89.3 SEC — HIGH (ref 27.5–36.3)
BLD GP AB SCN SERPL QL: NEGATIVE — SIGNIFICANT CHANGE UP
BUN SERPL-MCNC: 16 MG/DL — SIGNIFICANT CHANGE UP (ref 7–23)
CALCIUM SERPL-MCNC: 9 MG/DL — SIGNIFICANT CHANGE UP (ref 8.4–10.5)
CHLORIDE SERPL-SCNC: 102 MMOL/L — SIGNIFICANT CHANGE UP (ref 96–108)
CO2 SERPL-SCNC: 22 MMOL/L — SIGNIFICANT CHANGE UP (ref 22–31)
CREAT SERPL-MCNC: 1.23 MG/DL — SIGNIFICANT CHANGE UP (ref 0.5–1.3)
GLUCOSE SERPL-MCNC: 114 MG/DL — HIGH (ref 70–99)
HCT VFR BLD CALC: 40.6 % — SIGNIFICANT CHANGE UP (ref 39–50)
HCT VFR BLD CALC: 44.6 % — SIGNIFICANT CHANGE UP (ref 39–50)
HGB BLD-MCNC: 13.6 G/DL — SIGNIFICANT CHANGE UP (ref 13–17)
HGB BLD-MCNC: 13.7 G/DL — SIGNIFICANT CHANGE UP (ref 13–17)
INR BLD: 1.31 RATIO — HIGH (ref 0.88–1.16)
MCHC RBC-ENTMCNC: 28.8 PG — SIGNIFICANT CHANGE UP (ref 27–34)
MCHC RBC-ENTMCNC: 30.1 PG — SIGNIFICANT CHANGE UP (ref 27–34)
MCHC RBC-ENTMCNC: 30.7 GM/DL — LOW (ref 32–36)
MCHC RBC-ENTMCNC: 33.4 GM/DL — SIGNIFICANT CHANGE UP (ref 32–36)
MCV RBC AUTO: 90.2 FL — SIGNIFICANT CHANGE UP (ref 80–100)
MCV RBC AUTO: 93.7 FL — SIGNIFICANT CHANGE UP (ref 80–100)
PLATELET # BLD AUTO: 161 K/UL — SIGNIFICANT CHANGE UP (ref 150–400)
PLATELET # BLD AUTO: 184 K/UL — SIGNIFICANT CHANGE UP (ref 150–400)
POTASSIUM SERPL-MCNC: 4.3 MMOL/L — SIGNIFICANT CHANGE UP (ref 3.5–5.3)
POTASSIUM SERPL-SCNC: 4.3 MMOL/L — SIGNIFICANT CHANGE UP (ref 3.5–5.3)
PROTHROM AB SERPL-ACNC: 15.2 SEC — HIGH (ref 10–13.1)
RBC # BLD: 4.5 M/UL — SIGNIFICANT CHANGE UP (ref 4.2–5.8)
RBC # BLD: 4.76 M/UL — SIGNIFICANT CHANGE UP (ref 4.2–5.8)
RBC # FLD: 13.4 % — SIGNIFICANT CHANGE UP (ref 10.3–14.5)
RBC # FLD: 13.6 % — SIGNIFICANT CHANGE UP (ref 10.3–14.5)
RH IG SCN BLD-IMP: POSITIVE — SIGNIFICANT CHANGE UP
SODIUM SERPL-SCNC: 137 MMOL/L — SIGNIFICANT CHANGE UP (ref 135–145)
T4 FREE SERPL-MCNC: 1.1 NG/DL — SIGNIFICANT CHANGE UP (ref 0.9–1.8)
TSH SERPL-MCNC: 6.89 UIU/ML — HIGH (ref 0.27–4.2)
WBC # BLD: 7.24 K/UL — SIGNIFICANT CHANGE UP (ref 3.8–10.5)
WBC # BLD: 8.4 K/UL — SIGNIFICANT CHANGE UP (ref 3.8–10.5)
WBC # FLD AUTO: 7.24 K/UL — SIGNIFICANT CHANGE UP (ref 3.8–10.5)
WBC # FLD AUTO: 8.4 K/UL — SIGNIFICANT CHANGE UP (ref 3.8–10.5)

## 2019-02-27 PROCEDURE — 93306 TTE W/DOPPLER COMPLETE: CPT | Mod: 26

## 2019-02-27 RX ORDER — APIXABAN 2.5 MG/1
5 TABLET, FILM COATED ORAL EVERY 12 HOURS
Refills: 0 | Status: DISCONTINUED | OUTPATIENT
Start: 2019-02-27 | End: 2019-02-27

## 2019-02-27 RX ORDER — HEPARIN SODIUM 5000 [USP'U]/ML
10000 INJECTION INTRAVENOUS; SUBCUTANEOUS EVERY 6 HOURS
Refills: 0 | Status: DISCONTINUED | OUTPATIENT
Start: 2019-02-27 | End: 2019-03-01

## 2019-02-27 RX ORDER — HEPARIN SODIUM 5000 [USP'U]/ML
5000 INJECTION INTRAVENOUS; SUBCUTANEOUS EVERY 6 HOURS
Refills: 0 | Status: DISCONTINUED | OUTPATIENT
Start: 2019-02-27 | End: 2019-02-27

## 2019-02-27 RX ORDER — HEPARIN SODIUM 5000 [USP'U]/ML
5000 INJECTION INTRAVENOUS; SUBCUTANEOUS EVERY 6 HOURS
Refills: 0 | Status: DISCONTINUED | OUTPATIENT
Start: 2019-02-27 | End: 2019-03-01

## 2019-02-27 RX ORDER — HEPARIN SODIUM 5000 [USP'U]/ML
2100 INJECTION INTRAVENOUS; SUBCUTANEOUS
Qty: 25000 | Refills: 0 | Status: DISCONTINUED | OUTPATIENT
Start: 2019-02-27 | End: 2019-03-01

## 2019-02-27 RX ORDER — HEPARIN SODIUM 5000 [USP'U]/ML
INJECTION INTRAVENOUS; SUBCUTANEOUS
Qty: 25000 | Refills: 0 | Status: DISCONTINUED | OUTPATIENT
Start: 2019-02-27 | End: 2019-02-27

## 2019-02-27 RX ORDER — PETROLATUM,WHITE
1 JELLY (GRAM) TOPICAL
Refills: 0 | Status: DISCONTINUED | OUTPATIENT
Start: 2019-02-27 | End: 2019-03-01

## 2019-02-27 RX ORDER — WARFARIN SODIUM 2.5 MG/1
7.5 TABLET ORAL ONCE
Refills: 0 | Status: COMPLETED | OUTPATIENT
Start: 2019-02-27 | End: 2019-02-27

## 2019-02-27 RX ORDER — HEPARIN SODIUM 5000 [USP'U]/ML
10000 INJECTION INTRAVENOUS; SUBCUTANEOUS EVERY 6 HOURS
Refills: 0 | Status: DISCONTINUED | OUTPATIENT
Start: 2019-02-27 | End: 2019-02-27

## 2019-02-27 RX ORDER — LISINOPRIL 2.5 MG/1
5 TABLET ORAL DAILY
Refills: 0 | Status: DISCONTINUED | OUTPATIENT
Start: 2019-02-27 | End: 2019-03-01

## 2019-02-27 RX ORDER — WARFARIN SODIUM 2.5 MG/1
5 TABLET ORAL ONCE
Refills: 0 | Status: DISCONTINUED | OUTPATIENT
Start: 2019-02-27 | End: 2019-02-27

## 2019-02-27 RX ADMIN — HEPARIN SODIUM 2100 UNIT(S)/HR: 5000 INJECTION INTRAVENOUS; SUBCUTANEOUS at 18:33

## 2019-02-27 RX ADMIN — Medication 1 APPLICATION(S): at 17:19

## 2019-02-27 RX ADMIN — TIOTROPIUM BROMIDE 1 CAPSULE(S): 18 CAPSULE ORAL; RESPIRATORY (INHALATION) at 17:19

## 2019-02-27 RX ADMIN — WARFARIN SODIUM 7.5 MILLIGRAM(S): 2.5 TABLET ORAL at 21:16

## 2019-02-27 RX ADMIN — Medication 81 MILLIGRAM(S): at 11:53

## 2019-02-27 RX ADMIN — Medication 600 MILLIGRAM(S): at 05:13

## 2019-02-27 RX ADMIN — LISINOPRIL 5 MILLIGRAM(S): 2.5 TABLET ORAL at 13:48

## 2019-02-27 RX ADMIN — Medication 20 MILLIGRAM(S): at 05:13

## 2019-02-27 RX ADMIN — HEPARIN SODIUM 5000 UNIT(S): 5000 INJECTION INTRAVENOUS; SUBCUTANEOUS at 11:58

## 2019-02-27 RX ADMIN — Medication 50 MILLIGRAM(S): at 05:13

## 2019-02-27 RX ADMIN — HEPARIN SODIUM 1800 UNIT(S)/HR: 5000 INJECTION INTRAVENOUS; SUBCUTANEOUS at 01:52

## 2019-02-27 RX ADMIN — Medication 50 MILLIGRAM(S): at 17:19

## 2019-02-27 RX ADMIN — HEPARIN SODIUM 2100 UNIT(S)/HR: 5000 INJECTION INTRAVENOUS; SUBCUTANEOUS at 11:53

## 2019-02-27 NOTE — PROGRESS NOTE ADULT - SUBJECTIVE AND OBJECTIVE BOX
Subjective: no chest pain      aspirin 81 milliGRAM(s) Oral daily  benzonatate 200 milliGRAM(s) Oral every 8 hours PRN  furosemide   Injectable 20 milliGRAM(s) IV Push daily  guaiFENesin  milliGRAM(s) Oral every 12 hours  heparin  Infusion.  Unit(s)/Hr IV Continuous <Continuous>  heparin  Injectable 19227 Unit(s) IV Push every 6 hours PRN  heparin  Injectable 5000 Unit(s) IV Push every 6 hours PRN  influenza   Vaccine 0.5 milliLiter(s) IntraMuscular once  levalbuterol Inhalation 0.63 milliGRAM(s) Inhalation every 6 hours PRN  metoprolol tartrate 50 milliGRAM(s) Oral two times a day  tiotropium 18 MICROgram(s) Capsule 1 Capsule(s) Inhalation daily                            13.7   7.24  )-----------( 184      ( 27 Feb 2019 08:36 )             44.6       02-27    137  |  102  |  16  ----------------------------<  114<H>  4.3   |  22  |  1.23    Ca    9.0      27 Feb 2019 06:50              T(C): 36.7 (02-27-19 @ 05:05), Max: 36.7 (02-26-19 @ 14:54)  HR: 95 (02-27-19 @ 05:05) (95 - 98)  BP: 114/57 (02-27-19 @ 05:05) (114/57 - 120/74)  RR: 18 (02-27-19 @ 05:05) (18 - 18)  SpO2: 96% (02-27-19 @ 05:05) (96% - 96%)  Wt(kg): --    I&O's Summary    26 Feb 2019 07:01  -  27 Feb 2019 07:00  --------------------------------------------------------  IN: 1254 mL / OUT: 0 mL / NET: 1254 mL    27 Feb 2019 07:01  -  27 Feb 2019 10:45  --------------------------------------------------------  IN: 0 mL / OUT: 0 mL / NET: 0 mL      	  Lymphatic: + edema in LE bilaterally   Cardiovascular: Normal S1 S2,IRRR No murmurs , Peripheral pulses palpable 2+ bilaterally  Respiratory: Lungs clear to auscultation, normal effort 	  Gastrointestinal:  Soft, Non-tender, + BS	  Skin: No rashes, No ecchymoses, No cyanosis, warm to touch      TELEMETRY: AFl  ECG:  	  RADIOLOGY:   DIAGNOSTIC TESTING:  [ ] Echocardiogram:  [ ]  Catheterization:  [ ] Stress Test:    OTHER: 	      ASSESSMENT/PLAN: 57 yo M with PMHx of obesity who is being seen for dyspnea and aflutter.  -continue with hep gtt if no contraindications  -continue with IV diuresis for LE edema  -check TTE to evaluate LV function  -further workup including ischemic evaluation pending above   -possible flutter ablation per PEYMAN Vidal MD

## 2019-02-27 NOTE — PHARMACOTHERAPY INTERVENTION NOTE - COMMENTS
Patient on heparin drip for atrial fibrillation, with family history of hypercoagulability. Ordered for transition to oral apixaban. However, patient obese with BMI 64.2 (weight 239.3 kg), NOACs not recommended in obesity to to unknown efficacy, and anti-Xa monitoring for apixaban currently unavailable in-house to assess for efficacy in this population. Recommended consider heparin to warfarin bridge instead.    Shaylee Damian, PharmD   [phone] Patient on heparin drip for atrial fibrillation, with family history of hypercoagulability. Ordered for transition to oral apixaban. However, patient is obese with BMI 64.2 (weight 239.3 kg). Per [facility] guidance, NOACs are not recommended in BMI >40 or weight >120 kg due to limited data and unknown efficacy, and anti-Xa monitoring for apixaban currently unavailable in-house to assess for efficacy in this population. Recommended consider heparin to warfarin bridge instead.    Shaylee Damian, PharmD   [phone]

## 2019-02-27 NOTE — PROGRESS NOTE ADULT - ASSESSMENT
59 yo M with no significant PMH but has significant FH of PE (brother  of PE, sister had PE), genetic hypercoagulation (nephew), p/w 2 wk, SOB. Continuous, no improvement, cannot lie down, and has worsening b/l leg swelling. Started back pain, since yesterday, sharp, mid/high, 8/10, continuous, some relief by taking aspirin. Denies long travel or trauma. May have sick contact, his house is close to a hospital. Had runny nose and cough x 2-3 wks. Was seen in [facility] and started on Abxs . His symptoms got worse so came here .      Problem/Plan - 1:  ·  Problem: Atrial flutter with FVR .  Plan: Rate control with BB and on AC . Doing well.   Cardiology and EP following so manegemnt per them. . TTE < from: TTE with Doppler (w/Cont) (19 @ 09:41) >  Conclusions:  Technically difficult study with limited/suboptimal views.  1. Normal left ventricular internal dimensions and wall  thicknesses.  2. Endocardium not well visualized despite the use of  definity contrast; grossly severe global left ventricular  systolic dysfunction. Endocardial visualization enhanced  with intravenous injection of Ultrasonic Enhancing Agent  (Definity).  3. The right ventricle is not well visualized; grossly  decreased right ventricular systolic function.  4. Estimated right ventricular systolic pressure equals 21  mm Hg, assuming right atrial pressure equals 8 mm Hg,  consistent with normal pulmonary pressures.  *** No previous Echo exam.    < end of copied text >  and TFT noted .     Problem/Plan - 2:  ·  Problem:  Viral respiratory infection with persistent cough .  Plan: hmp virus. Supportive care. Cough improving.      Problem/Plan - 3:  ·  Problem: Chest pain.  Plan:  ACS R/O and likely sec to A flutter as well viral infection.      Problem/Plan - 4:  ·  Problem: Dyspnea.  Plan: Sec to Atrial flutter as well viral infection CTA noted. < from: CT Angio Chest w/ IV Cont (19 @ 16:31) >. Will check Doppler legs to R/O DVT. BNP elevated . On Lasix.  Markedly limited study.    No pulmonary embolism in the main, right, and left pulmonary arteries.   Limited evaluation of the lobar, segmental, and subsegmental pulmonary   arteries.      Problem/Plan - 5:  ·  Problem:  INNA.  Plan: Watching BMP . On Lasix.      Problem/Plan - 6:  Problem: Family history of hypercoagulability. Plan: Will need outpt Work up .     Problem/Plan - 7:  ·  Problem:  Obesity.  Plan: Weight loss . 57 yo M with no significant PMH but has significant FH of PE (brother  of PE, sister had PE), genetic hypercoagulation (nephew), p/w 2 wk, SOB. Continuous, no improvement, cannot lie down, and has worsening b/l leg swelling. Started back pain, since yesterday, sharp, mid/high, 8/10, continuous, some relief by taking aspirin. Denies long travel or trauma. May have sick contact, his house is close to a hospital. Had runny nose and cough x 2-3 wks. Was seen in [facility] and started on Abxs . His symptoms got worse so came here .      Problem/Plan - 1:  ·  Problem: Atrial flutter with FVR .  Plan: Planned for DCCV and JOHN on Friday. Rate control with BB and on AC . Doing well.   Cardiology and EP following so manegemnt per them. . TTE < from: TTE with Doppler (w/Cont) (19 @ 09:41) >  Conclusions:  Technically difficult study with limited/suboptimal views.  1. Normal left ventricular internal dimensions and wall  thicknesses.  2. Endocardium not well visualized despite the use of  definity contrast; grossly severe global left ventricular  systolic dysfunction. Endocardial visualization enhanced  with intravenous injection of Ultrasonic Enhancing Agent  (Definity).  3. The right ventricle is not well visualized; grossly  decreased right ventricular systolic function.  4. Estimated right ventricular systolic pressure equals 21  mm Hg, assuming right atrial pressure equals 8 mm Hg,  consistent with normal pulmonary pressures.  *** No previous Echo exam.    < end of copied text >  and TFT noted .     Problem/Plan - 2:  ·  Problem:  Viral respiratory infection with persistent cough .  Plan: hmp virus. Supportive care. Cough improving.      Problem/Plan - 3:  ·  Problem: Chest pain.  Plan:  ACS R/O and likely sec to A flutter as well viral infection.      Problem/Plan - 4:  ·  Problem: Acute systolic CHF with cardiomyopathy. .  Plan: Sec to Atrial flutter as well viral infection CTA noted. < from: CT Angio Chest w/ IV Cont (19 @ 16:31) >. Will check Doppler legs to R/O DVT. BNP elevated . On Lasix.  Markedly limited study.    No pulmonary embolism in the main, right, and left pulmonary arteries.   Limited evaluation of the lobar, segmental, and subsegmental pulmonary   arteries.      Problem/Plan - 5:  ·  Problem:  INNA.  Plan: Watching BMP . On Lasix.      Problem/Plan - 6:  Problem: Family history of hypercoagulability. Plan: Will need outpt Work up .     Problem/Plan - 7:  ·  Problem:  Obesity.  Plan: Weight loss .

## 2019-02-27 NOTE — PROGRESS NOTE ADULT - SUBJECTIVE AND OBJECTIVE BOX
EP ATTENDING    tele: typical AFL    less palpitations, less dyspnea, no angina, no syncope    apixaban 5 milliGRAM(s) Oral every 12 hours  aspirin 81 milliGRAM(s) Oral daily  furosemide   Injectable 20 milliGRAM(s) IV Push daily  influenza   Vaccine 0.5 milliLiter(s) IntraMuscular once  metoprolol tartrate 50 milliGRAM(s) Oral two times a day  tiotropium 18 MICROgram(s) Capsule 1 Capsule(s) Inhalation daily                            13.7   7.24  )-----------( 184      ( 27 Feb 2019 08:36 )             44.6       02-27    137  |  102  |  16  ----------------------------<  114<H>  4.3   |  22  |  1.23    Ca    9.0      27 Feb 2019 06:50        T(C): 36.7 (02-27-19 @ 05:05), Max: 36.7 (02-26-19 @ 14:54)  HR: 95 (02-27-19 @ 05:05) (95 - 98)  BP: 114/57 (02-27-19 @ 05:05) (114/57 - 120/74)  RR: 18 (02-27-19 @ 05:05) (18 - 18)  SpO2: 96% (02-27-19 @ 05:05) (96% - 96%)  Wt(kg): --    JVP 8  IRR, no murmurs  CTAB  soft nt/nd  no c/c/e    echo pending  TSH unremarkable  a1c unremarkable    A/P) 57 y/o male PMH BERNARDINO admitted with 1 month of CHF type symptoms, and 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 I had a OJHN-ablation planned, but his weigth (504 pounds) exceeds the 400 pound weight limit of the EP lab table. In addition his weight might even exceed the cath lab weight limit (Young is inquiring with cath lab). Therefore the best secondary option is JOHN/DCCV, with long term plan of CTI ablation when his weight is < 400 pounds. I called the JOHN lab and the anesthesia floor leader, and their policy for this weight is for JOHN/DCCV to be done in the OR under general anesthesia    -f/u TTE  -start eliquis 5 bid   -JOHN/DCCV in OR under general anesthesia scheduled for Friday at 10:30am  -NPO after midnight Thursday night  -expect d/c home Friday afternoon after JOHN/DCCV  -f/u with Sara after discharge EP ATTENDING    tele: typical AFL    less palpitations, less dyspnea, no angina, no syncope    aspirin 81 milliGRAM(s) Oral daily  furosemide   Injectable 20 milliGRAM(s) IV Push daily  heparin  Infusion.  Unit(s)/Hr IV Continuous <Continuous>  heparin  Injectable 07075 Unit(s) IV Push every 6 hours PRN  heparin  Injectable 5000 Unit(s) IV Push every 6 hours PRN  influenza   Vaccine 0.5 milliLiter(s) IntraMuscular once  metoprolol tartrate 50 milliGRAM(s) Oral two times a day  tiotropium 18 MICROgram(s) Capsule 1 Capsule(s) Inhalation daily  warfarin 5 milliGRAM(s) Oral once                            13.7   7.24  )-----------( 184      ( 27 Feb 2019 08:36 )             44.6       02-27    137  |  102  |  16  ----------------------------<  114<H>  4.3   |  22  |  1.23    Ca    9.0      27 Feb 2019 06:50        T(C): 36.7 (02-27-19 @ 05:05), Max: 36.7 (02-26-19 @ 14:54)  HR: 95 (02-27-19 @ 05:05) (95 - 98)  BP: 114/57 (02-27-19 @ 05:05) (114/57 - 120/74)  RR: 18 (02-27-19 @ 05:05) (18 - 18)  SpO2: 96% (02-27-19 @ 05:05) (96% - 96%)  Wt(kg): --    JVP 8  IRR, no murmurs  CTAB  soft nt/nd  no c/c/e    echo pending  TSH unremarkable  a1c unremarkable    A/P) 57 y/o male PMH BERNARDINO admitted with 1 month of CHF type symptoms, and 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 I had a JOHN-ablation planned, but his weigth (504 pounds) exceeds the 400 pound weight limit of the EP lab table. In addition his weight might even exceed the cath lab weight limit (Young is inquiring with cath lab). Therefore the best secondary option is JOHN/DCCV, with long term plan of CTI ablation when his weight is < 400 pounds. I called the JOHN lab and the anesthesia floor leader, and their policy for this weight is for JOHN/DCCV to be done in the OR under general anesthesia    -f/u TTE  -continue heparin drip, start coumadin 5mg (NOAC not recommended by pharmacy given his weight)  -JOHN/DCCV in OR under general anesthesia scheduled for Friday at 10:30am  -NPO after midnight Thursday night  -expect d/c home Friday afternoon after JOHN/DCCV  -f/u with Lyebony after discharge

## 2019-02-27 NOTE — PROGRESS NOTE ADULT - SUBJECTIVE AND OBJECTIVE BOX
INTERVAL HPI/OVERNIGHT EVENTS: No new concerns.   Vital Signs Last 24 Hrs  T(C): 36.4 (27 Feb 2019 13:45), Max: 36.7 (26 Feb 2019 14:54)  T(F): 97.6 (27 Feb 2019 13:45), Max: 98.1 (27 Feb 2019 05:05)  HR: 78 (27 Feb 2019 13:45) (78 - 98)  BP: 118/72 (27 Feb 2019 13:45) (114/57 - 120/74)  BP(mean): --  RR: 16 (27 Feb 2019 13:45) (16 - 18)  SpO2: 95% (27 Feb 2019 13:45) (95% - 96%)  I&O's Summary    26 Feb 2019 07:01  -  27 Feb 2019 07:00  --------------------------------------------------------  IN: 1254 mL / OUT: 0 mL / NET: 1254 mL    27 Feb 2019 07:01  -  27 Feb 2019 14:16  --------------------------------------------------------  IN: 0 mL / OUT: 0 mL / NET: 0 mL      MEDICATIONS  (STANDING):  AQUAPHOR (petrolatum Ointment) 1 Application(s) Topical two times a day  aspirin 81 milliGRAM(s) Oral daily  furosemide   Injectable 20 milliGRAM(s) IV Push daily  heparin  Infusion. 2100 Unit(s)/Hr (21 mL/Hr) IV Continuous <Continuous>  influenza   Vaccine 0.5 milliLiter(s) IntraMuscular once  lisinopril 5 milliGRAM(s) Oral daily  metoprolol tartrate 50 milliGRAM(s) Oral two times a day  tiotropium 18 MICROgram(s) Capsule 1 Capsule(s) Inhalation daily  warfarin 5 milliGRAM(s) Oral once    MEDICATIONS  (PRN):  heparin  Injectable 20974 Unit(s) IV Push every 6 hours PRN For aPTT less than 40  heparin  Injectable 5000 Unit(s) IV Push every 6 hours PRN For aPTT between 40 - 57    LABS:                        13.7   7.24  )-----------( 184      ( 27 Feb 2019 08:36 )             44.6     02-27    137  |  102  |  16  ----------------------------<  114<H>  4.3   |  22  |  1.23    Ca    9.0      27 Feb 2019 06:50      PT/INR - ( 27 Feb 2019 09:32 )   PT: 15.2 sec;   INR: 1.31 ratio         PTT - ( 27 Feb 2019 09:32 )  PTT:40.6 sec    CAPILLARY BLOOD GLUCOSE              REVIEW OF SYSTEMS:  CONSTITUTIONAL: No fever, weight loss, or fatigue  EYES: No eye pain, visual disturbances, or discharge  ENMT:  No difficulty hearing, tinnitus, vertigo; No sinus or throat pain  NECK: No pain or stiffness  BREASTS: No pain, masses, or nipple discharge  RESPIRATORY: cough,   CARDIOVASCULAR: No chest pain, palpitations, dizziness, or leg swelling  GASTROINTESTINAL: No abdominal or epigastric pain. No nausea, vomiting, or hematemesis; No diarrhea or constipation. No melena or hematochezia.  GENITOURINARY: No dysuria, frequency, hematuria, or incontinence  NEUROLOGICAL: No headaches, memory loss, loss of strength, numbness, or tremors      Consultant(s) Notes Reviewed:  [x ] YES  [ ] NO    PHYSICAL EXAM:  GENERAL: NAD, well-groomed, well-developed,not in any distress ,  HEAD:  Atraumatic, Normocephalic  EYES: EOMI, PERRLA, conjunctiva and sclera clear  ENMT: No tonsillar erythema, exudates, or enlargement; Moist mucous membranes, Good dentition, No lesions  NECK: Supple, No JVD, Normal thyroid  NERVOUS SYSTEM:  Alert & Oriented X3, No focal deficit   CHEST/LUNG: Good air entry bilateral with no  rales, rhonchi, wheezing, or rubs  HEART: Regular rate and rhythm; No murmurs, rubs, or gallops  ABDOMEN: Soft, Nontender, Nondistended; Bowel sounds present  EXTREMITIES:  2+ Peripheral Pulses, No clubbing, cyanosis, or edema    Care Discussed with Consultants/Other Providers [ x] YES  [ ] NO

## 2019-02-28 LAB
ANION GAP SERPL CALC-SCNC: 13 MMOL/L — SIGNIFICANT CHANGE UP (ref 5–17)
APTT BLD: 126.6 SEC — CRITICAL HIGH (ref 27.5–36.3)
APTT BLD: 51.3 SEC — HIGH (ref 27.5–36.3)
APTT BLD: 80.3 SEC — HIGH (ref 27.5–36.3)
BLD GP AB SCN SERPL QL: NEGATIVE — SIGNIFICANT CHANGE UP
BUN SERPL-MCNC: 16 MG/DL — SIGNIFICANT CHANGE UP (ref 7–23)
CALCIUM SERPL-MCNC: 9.1 MG/DL — SIGNIFICANT CHANGE UP (ref 8.4–10.5)
CHLORIDE SERPL-SCNC: 101 MMOL/L — SIGNIFICANT CHANGE UP (ref 96–108)
CO2 SERPL-SCNC: 23 MMOL/L — SIGNIFICANT CHANGE UP (ref 22–31)
CREAT SERPL-MCNC: 1.2 MG/DL — SIGNIFICANT CHANGE UP (ref 0.5–1.3)
GLUCOSE SERPL-MCNC: 137 MG/DL — HIGH (ref 70–99)
HCT VFR BLD CALC: 43.7 % — SIGNIFICANT CHANGE UP (ref 39–50)
HGB BLD-MCNC: 13.2 G/DL — SIGNIFICANT CHANGE UP (ref 13–17)
INR BLD: 1.26 RATIO — HIGH (ref 0.88–1.16)
MAGNESIUM SERPL-MCNC: 2.3 MG/DL — SIGNIFICANT CHANGE UP (ref 1.6–2.6)
MCHC RBC-ENTMCNC: 29 PG — SIGNIFICANT CHANGE UP (ref 27–34)
MCHC RBC-ENTMCNC: 30.2 GM/DL — LOW (ref 32–36)
MCV RBC AUTO: 96 FL — SIGNIFICANT CHANGE UP (ref 80–100)
PHOSPHATE SERPL-MCNC: 2.6 MG/DL — SIGNIFICANT CHANGE UP (ref 2.5–4.5)
PLATELET # BLD AUTO: 184 K/UL — SIGNIFICANT CHANGE UP (ref 150–400)
POTASSIUM SERPL-MCNC: 4.2 MMOL/L — SIGNIFICANT CHANGE UP (ref 3.5–5.3)
POTASSIUM SERPL-SCNC: 4.2 MMOL/L — SIGNIFICANT CHANGE UP (ref 3.5–5.3)
PROTHROM AB SERPL-ACNC: 14.3 SEC — HIGH (ref 10–13.1)
RBC # BLD: 4.55 M/UL — SIGNIFICANT CHANGE UP (ref 4.2–5.8)
RBC # FLD: 13.8 % — SIGNIFICANT CHANGE UP (ref 10.3–14.5)
RH IG SCN BLD-IMP: POSITIVE — SIGNIFICANT CHANGE UP
SODIUM SERPL-SCNC: 137 MMOL/L — SIGNIFICANT CHANGE UP (ref 135–145)
WBC # BLD: 8.21 K/UL — SIGNIFICANT CHANGE UP (ref 3.8–10.5)
WBC # FLD AUTO: 8.21 K/UL — SIGNIFICANT CHANGE UP (ref 3.8–10.5)

## 2019-02-28 RX ORDER — WARFARIN SODIUM 2.5 MG/1
7.5 TABLET ORAL ONCE
Refills: 0 | Status: COMPLETED | OUTPATIENT
Start: 2019-02-28 | End: 2019-02-28

## 2019-02-28 RX ADMIN — Medication 1 APPLICATION(S): at 18:40

## 2019-02-28 RX ADMIN — HEPARIN SODIUM 2100 UNIT(S)/HR: 5000 INJECTION INTRAVENOUS; SUBCUTANEOUS at 18:37

## 2019-02-28 RX ADMIN — WARFARIN SODIUM 7.5 MILLIGRAM(S): 2.5 TABLET ORAL at 22:02

## 2019-02-28 RX ADMIN — TIOTROPIUM BROMIDE 1 CAPSULE(S): 18 CAPSULE ORAL; RESPIRATORY (INHALATION) at 11:30

## 2019-02-28 RX ADMIN — Medication 1 APPLICATION(S): at 05:43

## 2019-02-28 RX ADMIN — HEPARIN SODIUM 2400 UNIT(S)/HR: 5000 INJECTION INTRAVENOUS; SUBCUTANEOUS at 11:28

## 2019-02-28 RX ADMIN — Medication 20 MILLIGRAM(S): at 05:43

## 2019-02-28 RX ADMIN — LISINOPRIL 5 MILLIGRAM(S): 2.5 TABLET ORAL at 05:44

## 2019-02-28 RX ADMIN — HEPARIN SODIUM 2100 UNIT(S)/HR: 5000 INJECTION INTRAVENOUS; SUBCUTANEOUS at 01:42

## 2019-02-28 RX ADMIN — Medication 81 MILLIGRAM(S): at 14:50

## 2019-02-28 RX ADMIN — Medication 50 MILLIGRAM(S): at 05:44

## 2019-02-28 RX ADMIN — Medication 50 MILLIGRAM(S): at 18:40

## 2019-02-28 NOTE — PHARMACOTHERAPY INTERVENTION NOTE - COMMENTS
Counseled patient on warfarin dose, indication, monitoring, drug/food interactions, and possible side effects. "Taking Warfarin Safely" educational booklet provided to patient. Heart Failure booklet also provided to patient and medications reviewed.    Shaylee Damian, PharmD   [phone]

## 2019-02-28 NOTE — PROGRESS NOTE ADULT - SUBJECTIVE AND OBJECTIVE BOX
Patient denies CP, SOB Review of systems otherwise (-)    AQUAPHOR (petrolatum Ointment) 1 Application(s) Topical two times a day  aspirin 81 milliGRAM(s) Oral daily  furosemide   Injectable 20 milliGRAM(s) IV Push daily  heparin  Infusion. 2100 Unit(s)/Hr IV Continuous <Continuous>  heparin  Injectable 83074 Unit(s) IV Push every 6 hours PRN  heparin  Injectable 5000 Unit(s) IV Push every 6 hours PRN  influenza   Vaccine 0.5 milliLiter(s) IntraMuscular once  lisinopril 5 milliGRAM(s) Oral daily  metoprolol tartrate 50 milliGRAM(s) Oral two times a day  tiotropium 18 MICROgram(s) Capsule 1 Capsule(s) Inhalation daily  warfarin 7.5 milliGRAM(s) Oral once                            13.2   8.21  )-----------( 184      ( 28 Feb 2019 09:41 )             43.7       Hemoglobin: 13.2 g/dL (02-28 @ 09:41)  Hemoglobin: 13.6 g/dL (02-27 @ 17:56)  Hemoglobin: 13.7 g/dL (02-27 @ 08:36)  Hemoglobin: 12.9 g/dL (02-26 @ 11:33)  Hemoglobin: 12.2 g/dL (02-25 @ 08:08)      02-28    137  |  101  |  16  ----------------------------<  137<H>  4.2   |  23  |  1.20    Ca    9.1      28 Feb 2019 06:10  Phos  2.6     02-28  Mg     2.3     02-28      Creatinine Trend: 1.20<--, 1.23<--, 1.33<--, 1.33<--, 1.39<--, 1.24<--    COAGS: PT/INR - ( 28 Feb 2019 08:19 )   PT: 14.3 sec;   INR: 1.26 ratio         PTT - ( 28 Feb 2019 08:19 )  PTT:51.3 sec          T(C): 36.6 (02-28-19 @ 14:02), Max: 36.6 (02-27-19 @ 20:46)  HR: 96 (02-28-19 @ 14:02) (96 - 106)  BP: 123/89 (02-28-19 @ 14:02) (112/78 - 131/82)  RR: 16 (02-28-19 @ 14:02) (16 - 18)  SpO2: 98% (02-28-19 @ 14:02) (95% - 98%)  Wt(kg): --    I&O's Summary    27 Feb 2019 07:01  -  28 Feb 2019 07:00  --------------------------------------------------------  IN: 900 mL / OUT: 600 mL / NET: 300 mL    28 Feb 2019 07:01  -  28 Feb 2019 15:45  --------------------------------------------------------  IN: 780 mL / OUT: 550 mL / NET: 230 mL        	  Lymphatic: + edema in LE bilaterally   Cardiovascular: Normal S1 S2,IRRR No murmurs , Peripheral pulses palpable 2+ bilaterally  Respiratory: Lungs clear to auscultation, normal effort 	  Gastrointestinal:  Soft, Non-tender, + BS	  Skin: No rashes, No ecchymoses, No cyanosis, warm to touch      TELEMETRY: AFl      ASSESSMENT/PLAN: 57 yo M with PMHx of obesity who is being seen for dyspnea and aflutter found with severe LV dysfunction    -continue with hep gtt if no contraindications  -continue with IV diuresis for LE edema  -Plan for JOHN DCCV tomorrow  - Patient habitus is precluding most perfusion modalities.  His weight prohibits stress testing and even cath.  Although a coronary CT is an option (higher table capacity) it will likely be difficult to interpret due to scatter artifact.    In addition since he cannot undergo angiography,  he is unlikely a revascularization at present  - will need sleep study as outpt r/o BERNARDINO    Giuseppe Mcleod MD, [facility]  [place]PER [phone]

## 2019-02-28 NOTE — PRE-ANESTHESIA EVALUATION ADULT - NSANTHPMHFT_GEN_ALL_CORE
admitted with SOB/chest pain; aflutter with RVR now rate controlled; cardiomyopathy and acute systolic CHF, LE edema diuresis, mornid obesity; family hx of PE; negative for PE CT chest angio; cough with viral resp infection supportive care;

## 2019-02-28 NOTE — PROGRESS NOTE ADULT - ASSESSMENT
59 yo M with no significant PMH but has significant FH of PE (brother  of PE, sister had PE), genetic hypercoagulation (nephew), p/w 2 wk, SOB. Continuous, no improvement, cannot lie down, and has worsening b/l leg swelling. Started back pain, since yesterday, sharp, mid/high, 8/10, continuous, some relief by taking aspirin. Denies long travel or trauma. May have sick contact, his house is close to a hospital. Had runny nose and cough x 2-3 wks. Was seen in [facility] and started on Abxs . His symptoms got worse so came here .      Problem/Plan - 1:  ·  Problem: Atrial flutter with FVR .  Plan: Planned for DCCV and JOHN tomorrow. Rate control with BB and on AC Heparin till INR 2-3  on Coumadin.. Doing well.   Cardiology and EP following so management per them. . TTE < from: TTE with Doppler (w/Cont) (19 @ 09:41) >  Conclusions:  Technically difficult study with limited/suboptimal views.  1. Normal left ventricular internal dimensions and wall  thicknesses.  2. Endocardium not well visualized despite the use of  definity contrast; grossly severe global left ventricular  systolic dysfunction. Endocardial visualization enhanced  with intravenous injection of Ultrasonic Enhancing Agent  (Definity).  3. The right ventricle is not well visualized; grossly  decreased right ventricular systolic function.  4. Estimated right ventricular systolic pressure equals 21  mm Hg, assuming right atrial pressure equals 8 mm Hg,  consistent with normal pulmonary pressures.  *** No previous Echo exam.    < end of copied text >  and TFT noted .     Problem/Plan - 2:  ·  Problem:  Viral respiratory infection with persistent cough .  Plan: hmp virus. Supportive care. Cough improving.      Problem/Plan - 3:  ·  Problem: Chest pain.  Plan:  ACS R/O and ischemic work up per cardiology.      Problem/Plan - 4:  ·  Problem: Acute systolic CHF with cardiomyopathy. .  Plan: IV Lasix and Ischemic work up per cardiology.      Problem/Plan - 5:  ·  Problem:  INNA.  Plan: Watching BMP . On Lasix.      Problem/Plan - 6:  Problem: Family history of hypercoagulability. Plan: Will need outpt Work up .     Problem/Plan - 7:  ·  Problem:  Obesity.  Plan: Weight loss .

## 2019-02-28 NOTE — PROGRESS NOTE ADULT - SUBJECTIVE AND OBJECTIVE BOX
INTERVAL HPI/OVERNIGHT EVENTS: My cough is better.   Vital Signs Last 24 Hrs  T(C): 36.6 (28 Feb 2019 14:02), Max: 36.6 (27 Feb 2019 20:46)  T(F): 97.9 (28 Feb 2019 14:02), Max: 97.9 (27 Feb 2019 20:46)  HR: 96 (28 Feb 2019 14:02) (96 - 106)  BP: 123/89 (28 Feb 2019 14:02) (112/78 - 131/82)  BP(mean): --  RR: 16 (28 Feb 2019 14:02) (16 - 18)  SpO2: 98% (28 Feb 2019 14:02) (95% - 98%)  I&O's Summary    27 Feb 2019 07:01  -  28 Feb 2019 07:00  --------------------------------------------------------  IN: 900 mL / OUT: 600 mL / NET: 300 mL    28 Feb 2019 07:01  -  28 Feb 2019 15:04  --------------------------------------------------------  IN: 780 mL / OUT: 550 mL / NET: 230 mL      MEDICATIONS  (STANDING):  AQUAPHOR (petrolatum Ointment) 1 Application(s) Topical two times a day  aspirin 81 milliGRAM(s) Oral daily  furosemide   Injectable 20 milliGRAM(s) IV Push daily  heparin  Infusion. 2100 Unit(s)/Hr (21 mL/Hr) IV Continuous <Continuous>  influenza   Vaccine 0.5 milliLiter(s) IntraMuscular once  lisinopril 5 milliGRAM(s) Oral daily  metoprolol tartrate 50 milliGRAM(s) Oral two times a day  tiotropium 18 MICROgram(s) Capsule 1 Capsule(s) Inhalation daily    MEDICATIONS  (PRN):  heparin  Injectable 32634 Unit(s) IV Push every 6 hours PRN For aPTT less than 40  heparin  Injectable 5000 Unit(s) IV Push every 6 hours PRN For aPTT between 40 - 57    LABS:                        13.2   8.21  )-----------( 184      ( 28 Feb 2019 09:41 )             43.7     02-28    137  |  101  |  16  ----------------------------<  137<H>  4.2   |  23  |  1.20    Ca    9.1      28 Feb 2019 06:10  Phos  2.6     02-28  Mg     2.3     02-28      PT/INR - ( 28 Feb 2019 08:19 )   PT: 14.3 sec;   INR: 1.26 ratio         PTT - ( 28 Feb 2019 08:19 )  PTT:51.3 sec    CAPILLARY BLOOD GLUCOSE              REVIEW OF SYSTEMS:  CONSTITUTIONAL: No fever, weight loss, or fatigue  EYES: No eye pain, visual disturbances, or discharge  ENMT:  No difficulty hearing, tinnitus, vertigo; No sinus or throat pain  NECK: No pain or stiffness  RESPIRATORY: No cough, wheezing, chills or hemoptysis; No shortness of breath  CARDIOVASCULAR: No chest pain, palpitations, dizziness, or leg swelling  GASTROINTESTINAL: No abdominal or epigastric pain. No nausea, vomiting, or hematemesis; No diarrhea or constipation. No melena or hematochezia.  GENITOURINARY: No dysuria, frequency, hematuria, or incontinence  NEUROLOGICAL: No headaches, memory loss, loss of strength, numbness, or tremors    Consultant(s) Notes Reviewed:  [x ] YES  [ ] NO    PHYSICAL EXAM:  GENERAL: NAD, well-groomed, well-developed, not in any distress ,  HEAD:  Atraumatic, Normocephalic  EYES: EOMI, PERRLA, conjunctiva and sclera clear  NECK: Supple, No JVD, Normal thyroid  NERVOUS SYSTEM:  Alert & Oriented X3, No focal deficit   CHEST/LUNG: Good air entry bilateral with no  rales, rhonchi, wheezing, or rubs  HEART: Regular rate and rhythm; No murmurs, rubs, or gallops  ABDOMEN: Soft, Nontender, Nondistended; Bowel sounds present  EXTREMITIES:  2+ Peripheral Pulses, No clubbing, cyanosis, or edema    Care Discussed with Consultants/Other Providers [ x] YES  [ ] NO

## 2019-02-28 NOTE — PROGRESS NOTE ADULT - SUBJECTIVE AND OBJECTIVE BOX
pt seen and examined, no complaints    AQUAPHOR (petrolatum Ointment) 1 Application(s) Topical two times a day  aspirin 81 milliGRAM(s) Oral daily  furosemide   Injectable 20 milliGRAM(s) IV Push daily  heparin  Infusion. 2100 Unit(s)/Hr IV Continuous <Continuous>  heparin  Injectable 09649 Unit(s) IV Push every 6 hours PRN  heparin  Injectable 5000 Unit(s) IV Push every 6 hours PRN  influenza   Vaccine 0.5 milliLiter(s) IntraMuscular once  lisinopril 5 milliGRAM(s) Oral daily  metoprolol tartrate 50 milliGRAM(s) Oral two times a day  tiotropium 18 MICROgram(s) Capsule 1 Capsule(s) Inhalation daily                            13.6   8.4   )-----------( 161      ( 27 Feb 2019 17:56 )             40.6       Hemoglobin: 13.6 g/dL (02-27 @ 17:56)  Hemoglobin: 13.7 g/dL (02-27 @ 08:36)  Hemoglobin: 12.9 g/dL (02-26 @ 11:33)  Hemoglobin: 12.2 g/dL (02-25 @ 08:08)  Hemoglobin: 12.4 g/dL (02-24 @ 12:56)      02-27    137  |  102  |  16  ----------------------------<  114<H>  4.3   |  22  |  1.23    Ca    9.0      27 Feb 2019 06:50      Creatinine Trend: 1.23<--, 1.33<--, 1.33<--, 1.39<--, 1.24<--, 1.31<--    COAGS: PT/INR - ( 27 Feb 2019 09:32 )   PT: 15.2 sec;   INR: 1.31 ratio         PTT - ( 28 Feb 2019 00:41 )  PTT:80.3 sec          T(C): 36.6 (02-28-19 @ 04:58), Max: 36.6 (02-27-19 @ 20:46)  HR: 103 (02-28-19 @ 04:58) (78 - 106)  BP: 131/82 (02-28-19 @ 04:58) (112/78 - 131/82)  RR: 18 (02-28-19 @ 04:58) (16 - 18)  SpO2: 95% (02-28-19 @ 04:58) (95% - 96%)  Wt(kg): --    I&O's Summary    26 Feb 2019 07:01  -  27 Feb 2019 07:00  --------------------------------------------------------  IN: 1254 mL / OUT: 0 mL / NET: 1254 mL    27 Feb 2019 07:01  -  28 Feb 2019 05:08  --------------------------------------------------------  IN: 900 mL / OUT: 600 mL / NET: 300 mL      Gastrointestinal:  Soft, Non-tender, + BS	  Skin: + erythema in LE bilaterally   Musculoskeletal: Normal range of motion, normal strength  Psychiatry:  Mood & affect appropriate      TELEMETRY: 	Aflutter     ECG:  	Aflutter, 92 , no acute Ischemia changes noted   RADIOLOGY: < from: CT Angio Chest w/ IV Cont (02.22.19 @ 16:31) >    IMPRESSION:     Markedly limited study.    No pulmonary embolism in the main, right, and left pulmonary arteries.   Limited evaluation of the lobar, segmental, and subsegmental pulmonary   arteries.  ECHO: < from: TTE with Doppler (w/Cont) (02.27.19 @ 09:41) >  ------------------------------------------------------------------------  Conclusions:  Technically difficult study with limited/suboptimal views.  1. Normal left ventricular internal dimensions and wall  thicknesses.  2. Endocardium not well visualized despite the use of  definity contrast; grossly severe global left ventricular  systolic dysfunction. Endocardial visualization enhanced  with intravenous injection of Ultrasonic Enhancing Agent  (Definity).  3. The right ventricle is not well visualized; grossly  decreased right ventricular systolic function.  4. Estimated right ventricular systolic pressure equals 21  mm Hg, assuming right atrial pressure equals 8 mm Hg,  consistent with normal pulmonary pressures.  *** No previous Echo exam.  ------------------------------------------------------------------------  Confirmed on  2/27/2019 - 12:11:35 by Tera Curiel M.D.  ---------    < end of copied text >              ASSESSMENT/PLAN: 58 yr male , no PHX , but family hx of PE , now SOB , CHF, Palpitation . Found to be in Aflutter , +RVP     A/C with heparin gtt , bridge to coumadin , INR 2-3   ASA, statin   Cont IV lasix , diuresing well ,   monitor Lytes   JOHN/DCCV  in OR under general anesthesia tomorrow,   NPO p jose ashley   D/W Dr Heard

## 2019-02-28 NOTE — PRE-ANESTHESIA EVALUATION ADULT - NSANTHOSAYNRD_GEN_A_CORE
No. BERNARDINO screening performed.  STOP BANG Legend: 0-2 = LOW Risk; 3-4 = INTERMEDIATE Risk; 5-8 = HIGH Risk

## 2019-03-01 ENCOUNTER — TRANSCRIPTION ENCOUNTER (OUTPATIENT)
Age: 59
End: 2019-03-01

## 2019-03-01 LAB
ANION GAP SERPL CALC-SCNC: 12 MMOL/L — SIGNIFICANT CHANGE UP (ref 5–17)
APTT BLD: 71.1 SEC — HIGH (ref 27.5–36.3)
APTT BLD: 77.4 SEC — HIGH (ref 27.5–36.3)
BLD GP AB SCN SERPL QL: NEGATIVE — SIGNIFICANT CHANGE UP
BUN SERPL-MCNC: 14 MG/DL — SIGNIFICANT CHANGE UP (ref 7–23)
CALCIUM SERPL-MCNC: 9.5 MG/DL — SIGNIFICANT CHANGE UP (ref 8.4–10.5)
CHLORIDE SERPL-SCNC: 100 MMOL/L — SIGNIFICANT CHANGE UP (ref 96–108)
CO2 SERPL-SCNC: 24 MMOL/L — SIGNIFICANT CHANGE UP (ref 22–31)
CREAT SERPL-MCNC: 1.22 MG/DL — SIGNIFICANT CHANGE UP (ref 0.5–1.3)
GLUCOSE SERPL-MCNC: 110 MG/DL — HIGH (ref 70–99)
HCT VFR BLD CALC: 41.2 % — SIGNIFICANT CHANGE UP (ref 39–50)
HGB BLD-MCNC: 13.9 G/DL — SIGNIFICANT CHANGE UP (ref 13–17)
INR BLD: 1.32 RATIO — HIGH (ref 0.88–1.16)
MAGNESIUM SERPL-MCNC: 2.4 MG/DL — SIGNIFICANT CHANGE UP (ref 1.6–2.6)
MCHC RBC-ENTMCNC: 30.7 PG — SIGNIFICANT CHANGE UP (ref 27–34)
MCHC RBC-ENTMCNC: 33.8 GM/DL — SIGNIFICANT CHANGE UP (ref 32–36)
MCV RBC AUTO: 91 FL — SIGNIFICANT CHANGE UP (ref 80–100)
PLATELET # BLD AUTO: 185 K/UL — SIGNIFICANT CHANGE UP (ref 150–400)
POTASSIUM SERPL-MCNC: 4.7 MMOL/L — SIGNIFICANT CHANGE UP (ref 3.5–5.3)
POTASSIUM SERPL-SCNC: 4.7 MMOL/L — SIGNIFICANT CHANGE UP (ref 3.5–5.3)
PROTHROM AB SERPL-ACNC: 15.2 SEC — HIGH (ref 10–12.9)
RBC # BLD: 4.53 M/UL — SIGNIFICANT CHANGE UP (ref 4.2–5.8)
RBC # FLD: 13.3 % — SIGNIFICANT CHANGE UP (ref 10.3–14.5)
RH IG SCN BLD-IMP: POSITIVE — SIGNIFICANT CHANGE UP
SODIUM SERPL-SCNC: 136 MMOL/L — SIGNIFICANT CHANGE UP (ref 135–145)
WBC # BLD: 8.2 K/UL — SIGNIFICANT CHANGE UP (ref 3.8–10.5)
WBC # FLD AUTO: 8.2 K/UL — SIGNIFICANT CHANGE UP (ref 3.8–10.5)

## 2019-03-01 PROCEDURE — 93312 ECHO TRANSESOPHAGEAL: CPT | Mod: 26

## 2019-03-01 PROCEDURE — 93320 DOPPLER ECHO COMPLETE: CPT | Mod: 26

## 2019-03-01 PROCEDURE — 93325 DOPPLER ECHO COLOR FLOW MAPG: CPT | Mod: 26

## 2019-03-01 RX ORDER — FUROSEMIDE 40 MG
40 TABLET ORAL
Refills: 0 | Status: DISCONTINUED | OUTPATIENT
Start: 2019-03-01 | End: 2019-03-05

## 2019-03-01 RX ORDER — FUROSEMIDE 40 MG
20 TABLET ORAL ONCE
Refills: 0 | Status: COMPLETED | OUTPATIENT
Start: 2019-03-01 | End: 2019-03-01

## 2019-03-01 RX ORDER — HEPARIN SODIUM 5000 [USP'U]/ML
10000 INJECTION INTRAVENOUS; SUBCUTANEOUS ONCE
Refills: 0 | Status: DISCONTINUED | OUTPATIENT
Start: 2019-03-01 | End: 2019-03-01

## 2019-03-01 RX ORDER — DIGOXIN 250 MCG
0.12 TABLET ORAL DAILY
Refills: 0 | Status: DISCONTINUED | OUTPATIENT
Start: 2019-03-01 | End: 2019-03-07

## 2019-03-01 RX ORDER — HEPARIN SODIUM 5000 [USP'U]/ML
10000 INJECTION INTRAVENOUS; SUBCUTANEOUS EVERY 6 HOURS
Refills: 0 | Status: DISCONTINUED | OUTPATIENT
Start: 2019-03-01 | End: 2019-03-05

## 2019-03-01 RX ORDER — HEPARIN SODIUM 5000 [USP'U]/ML
10000 INJECTION INTRAVENOUS; SUBCUTANEOUS EVERY 6 HOURS
Refills: 0 | Status: DISCONTINUED | OUTPATIENT
Start: 2019-03-01 | End: 2019-03-01

## 2019-03-01 RX ORDER — ONDANSETRON 8 MG/1
4 TABLET, FILM COATED ORAL ONCE
Refills: 0 | Status: DISCONTINUED | OUTPATIENT
Start: 2019-03-01 | End: 2019-03-01

## 2019-03-01 RX ORDER — HEPARIN SODIUM 5000 [USP'U]/ML
2100 INJECTION INTRAVENOUS; SUBCUTANEOUS
Qty: 25000 | Refills: 0 | Status: DISCONTINUED | OUTPATIENT
Start: 2019-03-01 | End: 2019-03-05

## 2019-03-01 RX ORDER — LISINOPRIL 2.5 MG/1
5 TABLET ORAL DAILY
Refills: 0 | Status: DISCONTINUED | OUTPATIENT
Start: 2019-03-01 | End: 2019-03-07

## 2019-03-01 RX ORDER — HEPARIN SODIUM 5000 [USP'U]/ML
INJECTION INTRAVENOUS; SUBCUTANEOUS
Qty: 25000 | Refills: 0 | Status: DISCONTINUED | OUTPATIENT
Start: 2019-03-01 | End: 2019-03-01

## 2019-03-01 RX ORDER — METOPROLOL TARTRATE 50 MG
50 TABLET ORAL DAILY
Refills: 0 | Status: DISCONTINUED | OUTPATIENT
Start: 2019-03-01 | End: 2019-03-03

## 2019-03-01 RX ORDER — HEPARIN SODIUM 5000 [USP'U]/ML
5000 INJECTION INTRAVENOUS; SUBCUTANEOUS EVERY 6 HOURS
Refills: 0 | Status: DISCONTINUED | OUTPATIENT
Start: 2019-03-01 | End: 2019-03-01

## 2019-03-01 RX ORDER — WARFARIN SODIUM 2.5 MG/1
7.5 TABLET ORAL ONCE
Refills: 0 | Status: DISCONTINUED | OUTPATIENT
Start: 2019-03-01 | End: 2019-03-01

## 2019-03-01 RX ORDER — HYDROMORPHONE HYDROCHLORIDE 2 MG/ML
0.2 INJECTION INTRAMUSCULAR; INTRAVENOUS; SUBCUTANEOUS
Refills: 0 | Status: DISCONTINUED | OUTPATIENT
Start: 2019-03-01 | End: 2019-03-01

## 2019-03-01 RX ORDER — HEPARIN SODIUM 5000 [USP'U]/ML
5000 INJECTION INTRAVENOUS; SUBCUTANEOUS EVERY 6 HOURS
Refills: 0 | Status: DISCONTINUED | OUTPATIENT
Start: 2019-03-01 | End: 2019-03-05

## 2019-03-01 RX ORDER — METOPROLOL TARTRATE 50 MG
25 TABLET ORAL ONCE
Refills: 0 | Status: DISCONTINUED | OUTPATIENT
Start: 2019-03-01 | End: 2019-03-03

## 2019-03-01 RX ORDER — WARFARIN SODIUM 2.5 MG/1
7.5 TABLET ORAL ONCE
Refills: 0 | Status: COMPLETED | OUTPATIENT
Start: 2019-03-01 | End: 2019-03-01

## 2019-03-01 RX ADMIN — Medication 40 MILLIGRAM(S): at 18:19

## 2019-03-01 RX ADMIN — HEPARIN SODIUM 2100 UNIT(S)/HR: 5000 INJECTION INTRAVENOUS; SUBCUTANEOUS at 18:00

## 2019-03-01 RX ADMIN — WARFARIN SODIUM 7.5 MILLIGRAM(S): 2.5 TABLET ORAL at 21:19

## 2019-03-01 RX ADMIN — Medication 50 MILLIGRAM(S): at 18:10

## 2019-03-01 RX ADMIN — Medication 1 APPLICATION(S): at 05:43

## 2019-03-01 RX ADMIN — Medication 20 MILLIGRAM(S): at 18:11

## 2019-03-01 RX ADMIN — Medication 20 MILLIGRAM(S): at 05:43

## 2019-03-01 RX ADMIN — LISINOPRIL 5 MILLIGRAM(S): 2.5 TABLET ORAL at 05:44

## 2019-03-01 RX ADMIN — Medication 0.12 MILLIGRAM(S): at 18:11

## 2019-03-01 RX ADMIN — HEPARIN SODIUM 2100 UNIT(S)/HR: 5000 INJECTION INTRAVENOUS; SUBCUTANEOUS at 01:45

## 2019-03-01 RX ADMIN — Medication 50 MILLIGRAM(S): at 05:44

## 2019-03-01 NOTE — PROGRESS NOTE ADULT - SUBJECTIVE AND OBJECTIVE BOX
EP ATTENDING    tele: typical AFL    less palpitations, less dyspnea, no angina, no syncope    AQUAPHOR (petrolatum Ointment) 1 Application(s) Topical two times a day  aspirin 81 milliGRAM(s) Oral daily  furosemide   Injectable 20 milliGRAM(s) IV Push daily  heparin  Infusion. 2100 Unit(s)/Hr IV Continuous <Continuous>  heparin  Injectable 50671 Unit(s) IV Push every 6 hours PRN  heparin  Injectable 5000 Unit(s) IV Push every 6 hours PRN  influenza   Vaccine 0.5 milliLiter(s) IntraMuscular once  lisinopril 5 milliGRAM(s) Oral daily  metoprolol tartrate 50 milliGRAM(s) Oral two times a day  tiotropium 18 MICROgram(s) Capsule 1 Capsule(s) Inhalation daily  warfarin 7.5 milliGRAM(s) Oral once                            13.2   8.21  )-----------( 184      ( 28 Feb 2019 09:41 )             43.7       02-28    137  |  101  |  16  ----------------------------<  137<H>  4.2   |  23  |  1.20    Ca    9.1      28 Feb 2019 06:10  Phos  2.6     02-28  Mg     2.3     02-28        T(C): 36.7 (03-01-19 @ 04:46), Max: 36.7 (03-01-19 @ 04:46)  HR: 104 (03-01-19 @ 04:46) (96 - 104)  BP: 110/75 (03-01-19 @ 04:46) (96/66 - 123/89)  RR: 18 (03-01-19 @ 04:46) (16 - 18)  SpO2: 95% (03-01-19 @ 04:46) (95% - 98%)  Wt(kg): --         JVP 8  IRR, no murmurs  CTAB  soft nt/nd  no c/c/e    echo severe LV dysfunction  TSH unremarkable  a1c unremarkable      A/P) 57 y/o male PMH BERNARDINO admitted with 1 month of CHF type symptoms, and 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 I had a JOHN-ablation planned, but his weigth (504 pounds) exceeds the 400 pound weight limit of the EP lab table. In addition his weight exceeds the cath lab weight limit. Therefore the best secondary option is JOHN/DCCV, with long term plan of CTI ablation when his weight is < 400 pounds.     -keep NPO for JOHN/DCCV today in the OR under general anesthesia (as per anesthesia and JOHN lab policy)  -continue heparin-coumadin until INR 2-3  -recommend anticoagulation for at least 6 weeks followed by ASA 325mg given chads-vasc 1 (LV dysfunction)  -continue toprol-lisinopril-lasix for LV dysfunction  -I will arrange f/u with Sara after discharge

## 2019-03-01 NOTE — DISCHARGE NOTE ADULT - CARE PROVIDER_API CALL
Crystal Gunter)  Internal Medicine  [address] [place] [address]  Phone: [phone]  Fax: [phone]  Follow Up Time:     Danielle Davis)  Cardiovascular Disease; Internal Medicine  [address] [facility] [address]  [place] [address]  Phone: [phone]  Fax: [phone]  Follow Up Time:     Travis Johnson)  Internal Medicine; Nephrology  [facility]  [place] [address]  Phone: [phone]  Fax: [phone]  Follow Up Time:     Derik Cormier)  Urology  [address], [address]  [place] [address]  Phone: [phone]  Fax: [phone]  Follow Up Time:

## 2019-03-01 NOTE — DISCHARGE NOTE ADULT - PLAN OF CARE
controlled heart rate Atrial fibrillation is the most common heart rhythm problem & has the risk of stroke & heart attack  It helps if you control your blood pressure, not drink more than 1-2 alcohol drinks per day, cut down on caffeine, getting treatment for over active thyroid gland, & getting exercise  Call your doctor if you feel your heart racing or beating unusually, chest tightness or pain, lightheaded, faint, shortness of breath especially with exercise  It is important to take your heart medication as prescribed  You may be on anticoagulation which is very important to take as directed -   you will need follow up PT/INR for Coumadin dosing on Monday 3/11 at Dr. Mckeon office - make appointment resolved weight loss management with nutrition follow up  you will need outpatient sleep study - PCP will give you pulmonologist for referral anticoagulation w/ Coumadin & therapeutic INR between 2.0 - 3.0 Weigh yourself daily.  If you gain 3lbs in 3 days, or 5lbs in a week call your Health Care Provider.  Do not eat or drink foods containing more than 2000mg of salt (sodium) in your diet every day.  Call your Health Care Provider if you have any swelling or increased swelling in your feet, ankles, and/or stomach.  Take all of your medication as directed.  If you become dizzy call your Health Care Provider.  take Lasix as ordered with metoprolol, lisinopril, digoxin  close cardiology follow up  restricted fluids with 1500 cc/day = six 8 ounces fluid per day follow up with urology for urine studies taken in hospital - urine cytology

## 2019-03-01 NOTE — DISCHARGE NOTE ADULT - MEDICATION SUMMARY - MEDICATIONS TO STOP TAKING
I will STOP taking the medications listed below when I get home from the hospital:    cefdinir 300 mg oral capsule  -- 1 cap(s) by mouth 2 times a day for 10 days    aspirin 325 mg oral tablet  -- 1 tab(s) by mouth once a day, As Needed    ibuprofen 200 mg oral tablet  -- 2 tab(s) by mouth , As Needed    Azithromycin 5 Day Dose Pack 250 mg oral tablet  -- 2 tabs by mouth on day 1, then 1 tab for 4  days    *patient has 1 tablet left for the treatment*    benzonatate 200 mg oral capsule  -- 1 cap(s) by mouth 3 times a day for 7 days as needed

## 2019-03-01 NOTE — PROGRESS NOTE ADULT - ASSESSMENT
59 yo M with no significant PMH but has significant FH of PE (brother  of PE, sister had PE), genetic hypercoagulation (nephew), p/w 2 wk, SOB. Continuous, no improvement, cannot lie down, and has worsening b/l leg swelling. Started back pain, since yesterday, sharp, mid/high, 8/10, continuous, some relief by taking aspirin. Denies long travel or trauma. May have sick contact, his house is close to a hospital. Had runny nose and cough x 2-3 wks. Was seen in [facility] and started on Abxs . His symptoms got worse so came here .      Problem/Plan - 1:  ·  Problem: Atrial flutter with FVR .  Plan: Planned for DCCV and JOHN today. Rate control with BB and on AC . Doing well.   Cardiology and EP following so management per them. . TTE < from: TTE with Doppler (w/Cont) (19 @ 09:41) >  Conclusions:  Technically difficult study with limited/suboptimal views.  1. Normal left ventricular internal dimensions and wall  thicknesses.  2. Endocardium not well visualized despite the use of  definity contrast; grossly severe global left ventricular  systolic dysfunction. Endocardial visualization enhanced  with intravenous injection of Ultrasonic Enhancing Agent  (Definity).  3. The right ventricle is not well visualized; grossly  decreased right ventricular systolic function.  4. Estimated right ventricular systolic pressure equals 21  mm Hg, assuming right atrial pressure equals 8 mm Hg,  consistent with normal pulmonary pressures.  *** No previous Echo exam.    < end of copied text >  and TFT noted .     Problem/Plan - 2:  ·  Problem:  Viral respiratory infection with persistent cough .  Plan: hmp virus. Supportive care. Cough improving.      Problem/Plan - 3:  ·  Problem: Chest pain.  Plan: Ischemic work up per cardiology.  ACS R/O and likely sec to A flutter as well viral infection.      Problem/Plan - 4:  ·  Problem: Acute systolic CHF with cardiomyopathy. .  Plan: Cardiology and EP helping. CTA noted. < from: CT Angio Chest w/ IV Cont (19 @ 16:31) >. Will check Doppler legs to R/O DVT. BNP elevated . On Lasix.  Markedly limited study.    No pulmonary embolism in the main, right, and left pulmonary arteries.   Limited evaluation of the lobar, segmental, and subsegmental pulmonary   arteries.      Problem/Plan - 5:  ·  Problem:  INNA.  Plan: Watching BMP . On Lasix.      Problem/Plan - 6:  Problem: Family history of hypercoagulability. Plan: Will need outpt Work up .     Problem/Plan - 7:  ·  Problem:  Obesity.  Plan: Weight loss .

## 2019-03-01 NOTE — DISCHARGE NOTE ADULT - PATIENT PORTAL LINK FT
You can access the [facility], offered by [facility], by registering with the following website: [email or web address] [facility]

## 2019-03-01 NOTE — PROGRESS NOTE ADULT - SUBJECTIVE AND OBJECTIVE BOX
pt seen and examined, no complaints    TELE: Aflutter     AQUAPHOR (petrolatum Ointment) 1 Application(s) Topical two times a day  aspirin 81 milliGRAM(s) Oral daily  furosemide   Injectable 20 milliGRAM(s) IV Push daily  heparin  Infusion. 2100 Unit(s)/Hr IV Continuous <Continuous>  heparin  Injectable 74308 Unit(s) IV Push every 6 hours PRN  heparin  Injectable 5000 Unit(s) IV Push every 6 hours PRN  influenza   Vaccine 0.5 milliLiter(s) IntraMuscular once  lisinopril 5 milliGRAM(s) Oral daily  metoprolol tartrate 50 milliGRAM(s) Oral two times a day  tiotropium 18 MICROgram(s) Capsule 1 Capsule(s) Inhalation daily                            13.2   8.21  )-----------( 184      ( 28 Feb 2019 09:41 )             43.7       Hemoglobin: 13.2 g/dL (02-28 @ 09:41)  Hemoglobin: 13.6 g/dL (02-27 @ 17:56)  Hemoglobin: 13.7 g/dL (02-27 @ 08:36)  Hemoglobin: 12.9 g/dL (02-26 @ 11:33)  Hemoglobin: 12.2 g/dL (02-25 @ 08:08)      02-28    137  |  101  |  16  ----------------------------<  137<H>  4.2   |  23  |  1.20    Ca    9.1      28 Feb 2019 06:10  Phos  2.6     02-28  Mg     2.3     02-28      Creatinine Trend: 1.20<--, 1.23<--, 1.33<--, 1.33<--, 1.39<--, 1.24<--    COAGS: PTT - ( 01 Mar 2019 00:27 )  PTT:77.4 sec          T(C): 36.7 (03-01-19 @ 04:46), Max: 36.7 (03-01-19 @ 04:46)  HR: 104 (03-01-19 @ 04:46) (96 - 104)  BP: 110/75 (03-01-19 @ 04:46) (96/66 - 123/89)  RR: 18 (03-01-19 @ 04:46) (16 - 18)  SpO2: 95% (03-01-19 @ 04:46) (95% - 98%)  Wt(kg): --    I&O's Summary    27 Feb 2019 07:01  -  28 Feb 2019 07:00  --------------------------------------------------------  IN: 900 mL / OUT: 600 mL / NET: 300 mL    28 Feb 2019 07:01  -  01 Mar 2019 05:12  --------------------------------------------------------  IN: 1260 mL / OUT: 550 mL / NET: 710 mL      Gastrointestinal:  Soft, Non-tender, + BS	  Skin: + erythema in LE bilaterally   Musculoskeletal: Normal range of motion, normal strength  Psychiatry:  Mood & affect appropriate      TELEMETRY: 	Aflutter     ECG:  	Aflutter, 92 , no acute Ischemia changes noted   RADIOLOGY: < from: CT Angio Chest w/ IV Cont (02.22.19 @ 16:31) >    IMPRESSION:     Markedly limited study.    No pulmonary embolism in the main, right, and left pulmonary arteries.   Limited evaluation of the lobar, segmental, and subsegmental pulmonary   arteries.  ECHO: < from: TTE with Doppler (w/Cont) (02.27.19 @ 09:41) >  ------------------------------------------------------------------------  Conclusions:  Technically difficult study with limited/suboptimal views.  1. Normal left ventricular internal dimensions and wall  thicknesses.  2. Endocardium not well visualized despite the use of  definity contrast; grossly severe global left ventricular  systolic dysfunction. Endocardial visualization enhanced  with intravenous injection of Ultrasonic Enhancing Agent  (Definity).  3. The right ventricle is not well visualized; grossly  decreased right ventricular systolic function.  4. Estimated right ventricular systolic pressure equals 21  mm Hg, assuming right atrial pressure equals 8 mm Hg,  consistent with normal pulmonary pressures.  *** No previous Echo exam.  ------------------------------------------------------------------------  Confirmed on  2/27/2019 - 12:11:35 by Tera Curiel M.D.  ---------    < end of copied text >              ASSESSMENT/PLAN: 58 yr male , no PHX , but family hx of PE , now SOB , CHF, Palpitation . Found to be in Aflutter , +RVP     A/C with heparin gtt , bridge to coumadin , INR 2-3   Pt is NPO for DCCV / JOHN in OR today   Tele aflutter overnight   ASA, statin   Cont IV lasix , diuresing well, monitor Isidro   D/W Dr Heard pt seen and examined, no complaints    TELE: Aflutter     AQUAPHOR (petrolatum Ointment) 1 Application(s) Topical two times a day  aspirin 81 milliGRAM(s) Oral daily  furosemide   Injectable 20 milliGRAM(s) IV Push daily  heparin  Infusion. 2100 Unit(s)/Hr IV Continuous <Continuous>  heparin  Injectable 46702 Unit(s) IV Push every 6 hours PRN  heparin  Injectable 5000 Unit(s) IV Push every 6 hours PRN  influenza   Vaccine 0.5 milliLiter(s) IntraMuscular once  lisinopril 5 milliGRAM(s) Oral daily  metoprolol tartrate 50 milliGRAM(s) Oral two times a day  tiotropium 18 MICROgram(s) Capsule 1 Capsule(s) Inhalation daily                            13.2   8.21  )-----------( 184      ( 28 Feb 2019 09:41 )             43.7       Hemoglobin: 13.2 g/dL (02-28 @ 09:41)  Hemoglobin: 13.6 g/dL (02-27 @ 17:56)  Hemoglobin: 13.7 g/dL (02-27 @ 08:36)  Hemoglobin: 12.9 g/dL (02-26 @ 11:33)  Hemoglobin: 12.2 g/dL (02-25 @ 08:08)      02-28    137  |  101  |  16  ----------------------------<  137<H>  4.2   |  23  |  1.20    Ca    9.1      28 Feb 2019 06:10  Phos  2.6     02-28  Mg     2.3     02-28      Creatinine Trend: 1.20<--, 1.23<--, 1.33<--, 1.33<--, 1.39<--, 1.24<--    COAGS: PTT - ( 01 Mar 2019 00:27 )  PTT:77.4 sec          T(C): 36.7 (03-01-19 @ 04:46), Max: 36.7 (03-01-19 @ 04:46)  HR: 104 (03-01-19 @ 04:46) (96 - 104)  BP: 110/75 (03-01-19 @ 04:46) (96/66 - 123/89)  RR: 18 (03-01-19 @ 04:46) (16 - 18)  SpO2: 95% (03-01-19 @ 04:46) (95% - 98%)  Wt(kg): --    I&O's Summary    27 Feb 2019 07:01  -  28 Feb 2019 07:00  --------------------------------------------------------  IN: 900 mL / OUT: 600 mL / NET: 300 mL    28 Feb 2019 07:01  -  01 Mar 2019 05:12  --------------------------------------------------------  IN: 1260 mL / OUT: 550 mL / NET: 710 mL      Gastrointestinal:  Soft, Non-tender, + BS	  Skin: + erythema in LE bilaterally   Musculoskeletal: Normal range of motion, normal strength  Psychiatry:  Mood & affect appropriate      TELEMETRY: 	Aflutter     ECG:  	Aflutter, 92 , no acute Ischemia changes noted   RADIOLOGY: < from: CT Angio Chest w/ IV Cont (02.22.19 @ 16:31) >    IMPRESSION:     Markedly limited study.    No pulmonary embolism in the main, right, and left pulmonary arteries.   Limited evaluation of the lobar, segmental, and subsegmental pulmonary   arteries.  ECHO: < from: TTE with Doppler (w/Cont) (02.27.19 @ 09:41) >  ------------------------------------------------------------------------  Conclusions:  Technically difficult study with limited/suboptimal views.  1. Normal left ventricular internal dimensions and wall  thicknesses.  2. Endocardium not well visualized despite the use of  definity contrast; grossly severe global left ventricular  systolic dysfunction. Endocardial visualization enhanced  with intravenous injection of Ultrasonic Enhancing Agent  (Definity).  3. The right ventricle is not well visualized; grossly  decreased right ventricular systolic function.  4. Estimated right ventricular systolic pressure equals 21  mm Hg, assuming right atrial pressure equals 8 mm Hg,  consistent with normal pulmonary pressures.  *** No previous Echo exam.  ------------------------------------------------------------------------  Confirmed on  2/27/2019 - 12:11:35 by Tera Curiel M.D.  ---------    < end of copied text >              ASSESSMENT/PLAN: 58 yr male , no PHX , but family hx of PE , now SOB , CHF, Palpitation . Found to be in Aflutter , +RVP     A/C with heparin gtt , bridge to coumadin , INR 2-3   Pt is NPO for DCCV / JOHN in OR today   Tele aflutter overnight   ASA, statin   Cont IV lasix , diuresing well, monitor Isidro   D/W Dr Mcleod

## 2019-03-01 NOTE — DISCHARGE NOTE ADULT - MEDICATION SUMMARY - MEDICATIONS TO TAKE
I will START or STAY ON the medications listed below when I get home from the hospital:    lisinopril 5 mg oral tablet  -- 1 tab(s) by mouth once a day  -- Indication: For Htn     digoxin 125 mcg (0.125 mg) oral tablet  -- 1 tab(s) by mouth once a day  -- Indication: For Atrial flutter    warfarin 10 mg oral tablet  -- 1 tab(s) by mouth once a day   -- Do not take this drug if you are pregnant.  It is very important that you take or use this exactly as directed.  Do not skip doses or discontinue unless directed by your doctor.  Obtain medical advice before taking any non-prescription drugs as some may affect the action of this medication.    -- Indication: For Coumadin     metoprolol succinate 100 mg oral tablet, extended release  -- 1 tab(s) by mouth once a day  -- Indication: For Atrial flutter    Lasix 80 mg oral tablet  -- 1 tab(s) by mouth once a day   -- Avoid prolonged or excessive exposure to direct and/or artificial sunlight while taking this medication.  It is very important that you take or use this exactly as directed.  Do not skip doses or discontinue unless directed by your doctor.  It may be advisable to drink a full glass orange juice or eat a banana daily while taking this medication.    -- Indication: For Acute systolic CHF (congestive heart failure)    Multiple Vitamins oral tablet  -- 1 tab(s) by mouth once a day  -- Indication: For Vitamin     cholecalciferol 1000 intl units oral tablet  -- 2 tab(s) by mouth once a day  -- Indication: For gout

## 2019-03-01 NOTE — DISCHARGE NOTE ADULT - CARE PROVIDERS DIRECT ADDRESSES
[email or web address],DirectAddress_[email or web address]

## 2019-03-01 NOTE — PROGRESS NOTE ADULT - SUBJECTIVE AND OBJECTIVE BOX
INTERVAL HPI/OVERNIGHT EVENTS: I feel fine . NPO for the procedure.   Vital Signs Last 24 Hrs  T(C): 36.7 (01 Mar 2019 04:46), Max: 36.7 (01 Mar 2019 04:46)  T(F): 98.1 (01 Mar 2019 04:46), Max: 98.1 (01 Mar 2019 04:46)  HR: 104 (01 Mar 2019 04:46) (96 - 104)  BP: 110/75 (01 Mar 2019 04:46) (96/66 - 123/89)  BP(mean): --  RR: 18 (01 Mar 2019 04:46) (16 - 18)  SpO2: 95% (01 Mar 2019 04:46) (95% - 98%)  I&O's Summary    28 Feb 2019 07:01  -  01 Mar 2019 07:00  --------------------------------------------------------  IN: 1260 mL / OUT: 950 mL / NET: 310 mL      MEDICATIONS  (STANDING):  AQUAPHOR (petrolatum Ointment) 1 Application(s) Topical two times a day  aspirin 81 milliGRAM(s) Oral daily  furosemide   Injectable 20 milliGRAM(s) IV Push daily  heparin  Infusion. 2100 Unit(s)/Hr (21 mL/Hr) IV Continuous <Continuous>  influenza   Vaccine 0.5 milliLiter(s) IntraMuscular once  lisinopril 5 milliGRAM(s) Oral daily  metoprolol tartrate 50 milliGRAM(s) Oral two times a day  tiotropium 18 MICROgram(s) Capsule 1 Capsule(s) Inhalation daily  warfarin 7.5 milliGRAM(s) Oral once    MEDICATIONS  (PRN):  heparin  Injectable 90088 Unit(s) IV Push every 6 hours PRN For aPTT less than 40  heparin  Injectable 5000 Unit(s) IV Push every 6 hours PRN For aPTT between 40 - 57    LABS:                        13.9   8.2   )-----------( 185      ( 01 Mar 2019 10:18 )             41.2     03-01    136  |  100  |  14  ----------------------------<  110<H>  4.7   |  24  |  1.22    Ca    9.5      01 Mar 2019 10:18  Phos  2.6     02-28  Mg     2.4     03-01      PT/INR - ( 01 Mar 2019 10:38 )   PT: 15.2 sec;   INR: 1.32 ratio         PTT - ( 01 Mar 2019 10:38 )  PTT:71.1 sec    CAPILLARY BLOOD GLUCOSE              REVIEW OF SYSTEMS:  CONSTITUTIONAL: No fever, weight loss, or fatigue  EYES: No eye pain, visual disturbances, or discharge  ENMT:  No difficulty hearing, tinnitus, vertigo; No sinus or throat pain  NECK: No pain or stiffness  RESPIRATORY: No cough, wheezing, chills or hemoptysis; No shortness of breath  CARDIOVASCULAR: No chest pain, palpitations, dizziness, or leg swelling  GASTROINTESTINAL: No abdominal or epigastric pain. No nausea, vomiting, or hematemesis; No diarrhea or constipation. No melena or hematochezia.  GENITOURINARY: No dysuria, frequency, hematuria, or incontinence  NEUROLOGICAL: No headaches, memory loss, loss of strength, numbness, or tremors      Consultant(s) Notes Reviewed:  [x ] YES  [ ] NO    PHYSICAL EXAM:  GENERAL: NAD, well-groomed, well-developed,not in any distress ,  HEAD:  Atraumatic, Normocephalic  EYES: EOMI, PERRLA, conjunctiva and sclera clear  NECK: Supple, No JVD, Normal thyroid  NERVOUS SYSTEM:  Alert & Oriented X3, No focal deficit   CHEST/LUNG: Good air entry bilateral with no  rales, rhonchi, wheezing, or rubs  HEART: Regular rate and rhythm; No murmurs, rubs, or gallops  ABDOMEN: Soft, Nontender, Nondistended; Bowel sounds present  EXTREMITIES:  2+ Peripheral Pulses, No clubbing, cyanosis, or edema    Care Discussed with Consultants/Other Providers [ x] YES  [ ] NO

## 2019-03-01 NOTE — DISCHARGE NOTE ADULT - REASON FOR ADMISSION
SOB and chest pain SOB and chest pain - diagnosed w/ new left ventricular dysfunction - unable to do ischemic workup due to weight, new aflutter but cardioversion aborted due to left atrial thrombus, anticoagulation for A flutter, LA thrombus & family history of hypercoagulapathy, acute systolic CHF w/ EF 30-35%, transient hematuria, metapnuemovirus with supportive care

## 2019-03-01 NOTE — DISCHARGE NOTE ADULT - PROVIDER TOKENS
PROVIDER:[TOKEN:[[id number]]],PROVIDER:[TOKEN:[[id number]]],PROVIDER:[TOKEN:[[id number]]],PROVIDER:[TOKEN:[[id number]]]

## 2019-03-01 NOTE — DISCHARGE NOTE ADULT - CARE PLAN
Principal Discharge DX:	Atrial flutter  Goal:	controlled heart rate  Assessment and plan of treatment:	Atrial fibrillation is the most common heart rhythm problem & has the risk of stroke & heart attack  It helps if you control your blood pressure, not drink more than 1-2 alcohol drinks per day, cut down on caffeine, getting treatment for over active thyroid gland, & getting exercise  Call your doctor if you feel your heart racing or beating unusually, chest tightness or pain, lightheaded, faint, shortness of breath especially with exercise  It is important to take your heart medication as prescribed  You may be on anticoagulation which is very important to take as directed -   you will need follow up PT/INR for Coumadin dosing on Monday 3/11 at Dr. Mckeon office - make appointment  Secondary Diagnosis:	Viral respiratory infection  Assessment and plan of treatment:	resolved  Secondary Diagnosis:	Obesity  Assessment and plan of treatment:	weight loss management with nutrition follow up  you will need outpatient sleep study - PCP will give you pulmonologist for referral  Secondary Diagnosis:	Family history of hypercoagulability  Assessment and plan of treatment:	anticoagulation w/ Coumadin & therapeutic INR between 2.0 - 3.0  Secondary Diagnosis:	Acute systolic CHF (congestive heart failure)  Assessment and plan of treatment:	Weigh yourself daily.  If you gain 3lbs in 3 days, or 5lbs in a week call your Health Care Provider.  Do not eat or drink foods containing more than 2000mg of salt (sodium) in your diet every day.  Call your Health Care Provider if you have any swelling or increased swelling in your feet, ankles, and/or stomach.  Take all of your medication as directed.  If you become dizzy call your Health Care Provider.  take Lasix as ordered with metoprolol, lisinopril, digoxin  close cardiology follow up  restricted fluids with 1500 cc/day = six 8 ounces fluid per day  Secondary Diagnosis:	Hematuria  Assessment and plan of treatment:	follow up with urology for urine studies taken in hospital - urine cytology

## 2019-03-01 NOTE — DISCHARGE NOTE ADULT - ADDITIONAL INSTRUCTIONS
follow up with primary care physician within one week after discharge  follow up with cardiology Dr. Mckeon on Monday 3/11 for evaluation and PT/INR for Coumadin dosing  follow up with urology Dr. Cormier for urine studies due to hematuria follow up with primary care physician within one week after discharge  follow up with cardiology Dr. Ruiz on Monday 3/11 for evaluation and PT/INR for Coumadin dosing  follow up with urology Dr. Cormier for urine studies due to hematuria

## 2019-03-01 NOTE — DISCHARGE NOTE ADULT - SECONDARY DIAGNOSIS.
Viral respiratory infection Obesity Family history of hypercoagulability Acute systolic CHF (congestive heart failure) Hematuria

## 2019-03-01 NOTE — CHART NOTE - NSCHARTNOTEFT_GEN_A_CORE
EP ATTENDING    Pre procedure JOHN showed large thrombus in ALBINA appendage. DCCV obviously cancelled. No EP procedure performed    Plan  -continue heparin-coumadin until INR 2-3  -recommend lifelong anticoagulation  -continue lisinopril 5, toprol xl 50  -continue lasix 20 IV bid  -add digoxin 0.125mg daily  -short term EP plan will be rate control and lifelong anticoagulation  -long term EP plan is ablation after several months of adequate anticoagulation and repeat JOHN, along with adequate weight loss  -ischemia evaluation as per cardiology  -I will arrange f/u with Lyebony after discharge

## 2019-03-01 NOTE — DISCHARGE NOTE ADULT - HOSPITAL COURSE
pt seen in urgent care states that he need ortho f/u post R elbow fx
59 yo M with no significant PMH but has significant FH of PE (brother  of PE, sister had PE), genetic hypercoagulation (nephew), p/w 2 wk, SOB. Continuous, no improvement, cannot lie down, and has worsening b/l leg swelling. Started back pain, since yesterday, sharp, mid/high, 8/10, continuous, some relief by taking aspirin. Denies long travel or trauma. May have sick contact, his house is close to a hospital. Had runny nose and cough x 2-3 wks. Was seen in [facility] and started on Abxs . His symptoms got worse so came here .      Problem/Plan - 1:  ·  Problem: Atrial flutter with FVR .  Plan: S/P  JOHN. < from: Transesophageal Echocardiogram w/o TTE (19 @ 14:04) >  Conclusions:  1. Left atrial enlargement.    Left atrial appendage  thrombus seen.   Decreased left atrial appendage velocities  noted.  2. Severe left ventricular enlargement.  3. Severe global left ventricular systolic dysfunction.  4. Severe right atrial enlargement.  5. Right ventricular enlargement with decreased right  ventricular systolic function.  6. Normal tricuspid valve. Moderate tricuspid  regurgitation.  7. Estimated pulmonary artery systolic pressure equals 37  mm Hg, assuming right atrial pressure equals 8 mm Hg,  consistent with borderline pulmonary pressures.  Reviewed in real time with Dr. Heard.    < end of copied text >   Rate control with BB and on AC . Doing well.   Cardiology and EP following so management per them. . TTE < from: TTE with Doppler (w/Cont) (19 @ 09:41) >  Conclusions:  Technically difficult study with limited/suboptimal views.  1. Normal left ventricular internal dimensions and wall  thicknesses.  2. Endocardium not well visualized despite the use of  definity contrast; grossly severe global left ventricular  systolic dysfunction. Endocardial visualization enhanced  with intravenous injection of Ultrasonic Enhancing Agent  (Definity).  3. The right ventricle is not well visualized; grossly  decreased right ventricular systolic function.  4. Estimated right ventricular systolic pressure equals 21  mm Hg, assuming right atrial pressure equals 8 mm Hg,  consistent with normal pulmonary pressures.  *** No previous Echo exam.    < end of copied text >  and TFT noted .Rate control with BB and on AC . Doing well.      Problem/Plan - 2:  ·  Problem:  Viral respiratory infection with persistent cough .  Plan: Sec to hmp virus. Supportive care. Cough improving.      Problem/Plan - 3:  ·  Problem: Chest pain.  Plan: Ischemic work up per cardiology.  ACS R/O and likely sec to A flutter as well viral infection.      Problem/Plan - 4:  ·  Problem: Acute systolic CHF with cardiomyopathy. .  Plan: Cardiology and EP helping. CTA noted. < from: CT Angio Chest w/ IV Cont (19 @ 16:31) >. Will check Doppler legs to R/O DVT. BNP elevated . On Lasix.  Markedly limited study.    No pulmonary embolism in the main, right, and left pulmonary arteries.   Limited evaluation of the lobar, segmental, and subsegmental pulmonary   arteries.      Problem/Plan - 5:  ·  Problem:  INNA.  Plan: Watching BMP . On Lasix.      Problem/Plan - 6:  Problem: Family history of hypercoagulability. Plan: Will need outpt Work up .     Problem/Plan - 7:  ·  Problem:  Obesity.  Plan: Weight loss .

## 2019-03-02 LAB
ANION GAP SERPL CALC-SCNC: 13 MMOL/L — SIGNIFICANT CHANGE UP (ref 5–17)
APTT BLD: 122.5 SEC — CRITICAL HIGH (ref 27.5–36.3)
APTT BLD: 54.5 SEC — HIGH (ref 27.5–36.3)
APTT BLD: 68.6 SEC — HIGH (ref 27.5–36.3)
APTT BLD: 89 SEC — HIGH (ref 27.5–36.3)
BUN SERPL-MCNC: 13 MG/DL — SIGNIFICANT CHANGE UP (ref 7–23)
CALCIUM SERPL-MCNC: 9.5 MG/DL — SIGNIFICANT CHANGE UP (ref 8.4–10.5)
CHLORIDE SERPL-SCNC: 100 MMOL/L — SIGNIFICANT CHANGE UP (ref 96–108)
CO2 SERPL-SCNC: 26 MMOL/L — SIGNIFICANT CHANGE UP (ref 22–31)
CREAT SERPL-MCNC: 1.28 MG/DL — SIGNIFICANT CHANGE UP (ref 0.5–1.3)
GLUCOSE SERPL-MCNC: 104 MG/DL — HIGH (ref 70–99)
HCT VFR BLD CALC: 42.6 % — SIGNIFICANT CHANGE UP (ref 39–50)
HCT VFR BLD CALC: 44.7 % — SIGNIFICANT CHANGE UP (ref 39–50)
HGB BLD-MCNC: 13.8 G/DL — SIGNIFICANT CHANGE UP (ref 13–17)
HGB BLD-MCNC: 14.2 G/DL — SIGNIFICANT CHANGE UP (ref 13–17)
INR BLD: 1.44 RATIO — HIGH (ref 0.88–1.16)
MAGNESIUM SERPL-MCNC: 2.1 MG/DL — SIGNIFICANT CHANGE UP (ref 1.6–2.6)
MCHC RBC-ENTMCNC: 28.9 PG — SIGNIFICANT CHANGE UP (ref 27–34)
MCHC RBC-ENTMCNC: 30.5 PG — SIGNIFICANT CHANGE UP (ref 27–34)
MCHC RBC-ENTMCNC: 30.9 GM/DL — LOW (ref 32–36)
MCHC RBC-ENTMCNC: 33.4 GM/DL — SIGNIFICANT CHANGE UP (ref 32–36)
MCV RBC AUTO: 91.4 FL — SIGNIFICANT CHANGE UP (ref 80–100)
MCV RBC AUTO: 93.5 FL — SIGNIFICANT CHANGE UP (ref 80–100)
PLATELET # BLD AUTO: 210 K/UL — SIGNIFICANT CHANGE UP (ref 150–400)
PLATELET # BLD AUTO: 214 K/UL — SIGNIFICANT CHANGE UP (ref 150–400)
POTASSIUM SERPL-MCNC: 3.9 MMOL/L — SIGNIFICANT CHANGE UP (ref 3.5–5.3)
POTASSIUM SERPL-SCNC: 3.9 MMOL/L — SIGNIFICANT CHANGE UP (ref 3.5–5.3)
PROTHROM AB SERPL-ACNC: 16.7 SEC — HIGH (ref 10–12.9)
RBC # BLD: 4.66 M/UL — SIGNIFICANT CHANGE UP (ref 4.2–5.8)
RBC # BLD: 4.78 M/UL — SIGNIFICANT CHANGE UP (ref 4.2–5.8)
RBC # FLD: 13.3 % — SIGNIFICANT CHANGE UP (ref 10.3–14.5)
RBC # FLD: 14.1 % — SIGNIFICANT CHANGE UP (ref 10.3–14.5)
SODIUM SERPL-SCNC: 139 MMOL/L — SIGNIFICANT CHANGE UP (ref 135–145)
WBC # BLD: 6.29 K/UL — SIGNIFICANT CHANGE UP (ref 3.8–10.5)
WBC # BLD: 9.5 K/UL — SIGNIFICANT CHANGE UP (ref 3.8–10.5)
WBC # FLD AUTO: 6.29 K/UL — SIGNIFICANT CHANGE UP (ref 3.8–10.5)
WBC # FLD AUTO: 9.5 K/UL — SIGNIFICANT CHANGE UP (ref 3.8–10.5)

## 2019-03-02 RX ORDER — WARFARIN SODIUM 2.5 MG/1
7.5 TABLET ORAL ONCE
Refills: 0 | Status: COMPLETED | OUTPATIENT
Start: 2019-03-02 | End: 2019-03-02

## 2019-03-02 RX ORDER — WARFARIN SODIUM 2.5 MG/1
5 TABLET ORAL ONCE
Refills: 0 | Status: DISCONTINUED | OUTPATIENT
Start: 2019-03-02 | End: 2019-03-02

## 2019-03-02 RX ADMIN — Medication 0.12 MILLIGRAM(S): at 05:16

## 2019-03-02 RX ADMIN — HEPARIN SODIUM 2100 UNIT(S)/HR: 5000 INJECTION INTRAVENOUS; SUBCUTANEOUS at 16:52

## 2019-03-02 RX ADMIN — LISINOPRIL 5 MILLIGRAM(S): 2.5 TABLET ORAL at 05:16

## 2019-03-02 RX ADMIN — Medication 40 MILLIGRAM(S): at 17:16

## 2019-03-02 RX ADMIN — HEPARIN SODIUM 2100 UNIT(S)/HR: 5000 INJECTION INTRAVENOUS; SUBCUTANEOUS at 01:30

## 2019-03-02 RX ADMIN — HEPARIN SODIUM 5000 UNIT(S): 5000 INJECTION INTRAVENOUS; SUBCUTANEOUS at 10:25

## 2019-03-02 RX ADMIN — Medication 50 MILLIGRAM(S): at 17:17

## 2019-03-02 RX ADMIN — Medication 40 MILLIGRAM(S): at 05:16

## 2019-03-02 RX ADMIN — HEPARIN SODIUM 2400 UNIT(S)/HR: 5000 INJECTION INTRAVENOUS; SUBCUTANEOUS at 10:23

## 2019-03-02 RX ADMIN — HEPARIN SODIUM 2100 UNIT(S)/HR: 5000 INJECTION INTRAVENOUS; SUBCUTANEOUS at 23:35

## 2019-03-02 RX ADMIN — WARFARIN SODIUM 7.5 MILLIGRAM(S): 2.5 TABLET ORAL at 21:26

## 2019-03-02 NOTE — PROGRESS NOTE ADULT - SUBJECTIVE AND OBJECTIVE BOX
pt seen and examined, no complaints, ROS - .     tele: Afib     digoxin     Tablet 0.125 milliGRAM(s) Oral daily  furosemide   Injectable 40 milliGRAM(s) IV Push two times a day  heparin  Infusion. 2100 Unit(s)/Hr IV Continuous <Continuous>  heparin  Injectable 50078 Unit(s) IV Push every 6 hours PRN  heparin  Injectable 5000 Unit(s) IV Push every 6 hours PRN  influenza   Vaccine 0.5 milliLiter(s) IntraMuscular once  lisinopril 5 milliGRAM(s) Oral daily  metoprolol succinate ER 50 milliGRAM(s) Oral daily  metoprolol tartrate 25 milliGRAM(s) Oral once                            14.2   9.5   )-----------( 214      ( 02 Mar 2019 00:40 )             42.6       Hemoglobin: 14.2 g/dL (03-02 @ 00:40)  Hemoglobin: 13.9 g/dL (03-01 @ 10:18)  Hemoglobin: 13.2 g/dL (02-28 @ 09:41)  Hemoglobin: 13.6 g/dL (02-27 @ 17:56)  Hemoglobin: 13.7 g/dL (02-27 @ 08:36)      03-01    136  |  100  |  14  ----------------------------<  110<H>  4.7   |  24  |  1.22    Ca    9.5      01 Mar 2019 10:18  Mg     2.4     03-01      Creatinine Trend: 1.22<--, 1.20<--, 1.23<--, 1.33<--, 1.33<--, 1.39<--    COAGS: PTT - ( 02 Mar 2019 00:40 )  PTT:68.6 sec          T(C): 36.6 (03-02-19 @ 04:30), Max: 36.7 (03-01-19 @ 18:15)  HR: 100 (03-02-19 @ 04:30) (98 - 106)  BP: 109/72 (03-02-19 @ 04:30) (91/72 - 127/73)  RR: 18 (03-02-19 @ 04:30) (12 - 18)  SpO2: 97% (03-02-19 @ 04:30) (92% - 100%)  Wt(kg): --    I&O's Summary    28 Feb 2019 07:01  -  01 Mar 2019 07:00  --------------------------------------------------------  IN: 1260 mL / OUT: 950 mL / NET: 310 mL    01 Mar 2019 07:01  -  02 Mar 2019 06:40  --------------------------------------------------------  IN: 0 mL / OUT: 850 mL / NET: -850 mL      JVP 8  IRR, no murmurs  CTAB  soft nt/nd  no c/c/e    echo severe LV dysfunction  TSH unremarkable  a1c unremarkable      A/P) 59 y/o male PMH BERNARDINO admitted with 1 month of CHF type symptoms, and 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 I had a JOHN-ablation planned, but his weigth (504 pounds) exceeds the 400 pound weight limit of the EP lab table. In addition his weight exceeds the cath lab weight limit. Therefore the best secondary option is JOHN/DCCV, with long term plan of CTI ablation when his weight is < 400 pounds.     - pt found to have ALBINA clot , EPS procedure cancelled.   -continue heparin-coumadin until INR 2-3  -recommend anticoagulation for at least 6 weeks followed by ASA 325mg given chads-vasc 1 (LV dysfunction)  -continue toprol-lisinopril-lasix for LV dysfunction  - keep net neg per cards   - we will arrange f/u with Sara after discharge  D/W Dr Heard pt seen and examined, no complaints, ROS - .     tele: Afib     digoxin     Tablet 0.125 milliGRAM(s) Oral daily  furosemide   Injectable 40 milliGRAM(s) IV Push two times a day  heparin  Infusion. 2100 Unit(s)/Hr IV Continuous <Continuous>  heparin  Injectable 54602 Unit(s) IV Push every 6 hours PRN  heparin  Injectable 5000 Unit(s) IV Push every 6 hours PRN  influenza   Vaccine 0.5 milliLiter(s) IntraMuscular once  lisinopril 5 milliGRAM(s) Oral daily  metoprolol succinate ER 50 milliGRAM(s) Oral daily  metoprolol tartrate 25 milliGRAM(s) Oral once                            14.2   9.5   )-----------( 214      ( 02 Mar 2019 00:40 )             42.6       Hemoglobin: 14.2 g/dL (03-02 @ 00:40)  Hemoglobin: 13.9 g/dL (03-01 @ 10:18)  Hemoglobin: 13.2 g/dL (02-28 @ 09:41)  Hemoglobin: 13.6 g/dL (02-27 @ 17:56)  Hemoglobin: 13.7 g/dL (02-27 @ 08:36)      03-01    136  |  100  |  14  ----------------------------<  110<H>  4.7   |  24  |  1.22    Ca    9.5      01 Mar 2019 10:18  Mg     2.4     03-01      Creatinine Trend: 1.22<--, 1.20<--, 1.23<--, 1.33<--, 1.33<--, 1.39<--    COAGS: PTT - ( 02 Mar 2019 00:40 )  PTT:68.6 sec          T(C): 36.6 (03-02-19 @ 04:30), Max: 36.7 (03-01-19 @ 18:15)  HR: 100 (03-02-19 @ 04:30) (98 - 106)  BP: 109/72 (03-02-19 @ 04:30) (91/72 - 127/73)  RR: 18 (03-02-19 @ 04:30) (12 - 18)  SpO2: 97% (03-02-19 @ 04:30) (92% - 100%)  Wt(kg): --    I&O's Summary    28 Feb 2019 07:01  -  01 Mar 2019 07:00  --------------------------------------------------------  IN: 1260 mL / OUT: 950 mL / NET: 310 mL    01 Mar 2019 07:01  -  02 Mar 2019 06:40  --------------------------------------------------------  IN: 0 mL / OUT: 850 mL / NET: -850 mL      JVP 8  IRR, no murmurs  CTAB  soft nt/nd  no c/c/e    echo severe LV dysfunction  TSH unremarkable  a1c unremarkable      A/P) 57 y/o male PMH BERNARDINO admitted with 1 month of CHF type symptoms, and 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 I had a JOHN-ablation planned, but his weigth (504 pounds) exceeds the 400 pound weight limit of the EP lab table. In addition his weight exceeds the cath lab weight limit. Therefore the best secondary option is JOHN/DCCV, with long term plan of CTI ablation when his weight is < 400 pounds.     - pt found to have ALBINA clot , EPS procedure cancelled.   -continue heparin-coumadin until INR 2-3  -continue toprol-lisinopril-lasix for LV dysfunction  - keep net neg per cards   - we will arrange f/u with Sara after discharge  D/W Dr Heard

## 2019-03-02 NOTE — PROGRESS NOTE ADULT - SUBJECTIVE AND OBJECTIVE BOX
INTERVAL HPI/OVERNIGHT EVENTS: No new concerns.   Vital Signs Last 24 Hrs  T(C): 36.4 (02 Mar 2019 14:44), Max: 36.7 (01 Mar 2019 18:15)  T(F): 97.6 (02 Mar 2019 14:44), Max: 98 (01 Mar 2019 18:15)  HR: 91 (02 Mar 2019 17:16) (91 - 106)  BP: 137/72 (02 Mar 2019 17:16) (97/62 - 137/72)  BP(mean): --  RR: 16 (02 Mar 2019 14:44) (16 - 18)  SpO2: 96% (02 Mar 2019 14:44) (96% - 98%)  I&O's Summary    01 Mar 2019 07:01  -  02 Mar 2019 07:00  --------------------------------------------------------  IN: 0 mL / OUT: 1250 mL / NET: -1250 mL    02 Mar 2019 07:01  -  02 Mar 2019 17:46  --------------------------------------------------------  IN: 720 mL / OUT: 700 mL / NET: 20 mL      MEDICATIONS  (STANDING):  digoxin     Tablet 0.125 milliGRAM(s) Oral daily  furosemide   Injectable 40 milliGRAM(s) IV Push two times a day  heparin  Infusion. 2100 Unit(s)/Hr (21 mL/Hr) IV Continuous <Continuous>  influenza   Vaccine 0.5 milliLiter(s) IntraMuscular once  lisinopril 5 milliGRAM(s) Oral daily  metoprolol succinate ER 50 milliGRAM(s) Oral daily  metoprolol tartrate 25 milliGRAM(s) Oral once  warfarin 7.5 milliGRAM(s) Oral once    MEDICATIONS  (PRN):  heparin  Injectable 30428 Unit(s) IV Push every 6 hours PRN For aPTT less than 40  heparin  Injectable 5000 Unit(s) IV Push every 6 hours PRN For aPTT between 40 - 57    LABS:                        13.8   6.29  )-----------( 210      ( 02 Mar 2019 15:53 )             44.7     03-02    139  |  100  |  13  ----------------------------<  104<H>  3.9   |  26  |  1.28    Ca    9.5      02 Mar 2019 09:25  Mg     2.1     03-02      PT/INR - ( 02 Mar 2019 09:33 )   PT: 16.7 sec;   INR: 1.44 ratio         PTT - ( 02 Mar 2019 16:18 )  PTT:122.5 sec    CAPILLARY BLOOD GLUCOSE              REVIEW OF SYSTEMS:  CONSTITUTIONAL: No fever, weight loss, or fatigue  EYES: No eye pain, visual disturbances, or discharge  ENMT:  No difficulty hearing, tinnitus, vertigo; No sinus or throat pain  NECK: No pain or stiffness  RESPIRATORY: No cough, wheezing, chills or hemoptysis; No shortness of breath  CARDIOVASCULAR: No chest pain, palpitations, dizziness, or leg swelling  GASTROINTESTINAL: No abdominal or epigastric pain. No nausea, vomiting, or hematemesis; No diarrhea or constipation. No melena or hematochezia.  GENITOURINARY: No dysuria, frequency, hematuria, or incontinence  NEUROLOGICAL: No headaches, memory loss, loss of strength, numbness, or tremors    Consultant(s) Notes Reviewed:  [x ] YES  [ ] NO    PHYSICAL EXAM:  GENERAL: NAD, obese ,not in any distress ,  HEAD:  Atraumatic, Normocephalic  EYES: EOMI, PERRLA, conjunctiva and sclera clear  NECK: Supple, No JVD, Normal thyroid  NERVOUS SYSTEM:  Alert & Oriented X3, No focal deficit   CHEST/LUNG: Good air entry bilateral with no  rales, rhonchi, wheezing, or rubs  HEART: Regular rate and rhythm; No murmurs, rubs, or gallops  ABDOMEN: Soft, Nontender, Nondistended; Bowel sounds present  EXTREMITIES:  2+ Peripheral Pulses, No clubbing, cyanosis, or edema    Care Discussed with Consultants/Other Providers [ x] YES  [ ] NO

## 2019-03-02 NOTE — PROGRESS NOTE ADULT - ASSESSMENT
59 yo M with no significant PMH but has significant FH of PE (brother  of PE, sister had PE), genetic hypercoagulation (nephew), p/w 2 wk, SOB. Continuous, no improvement, cannot lie down, and has worsening b/l leg swelling. Started back pain, since yesterday, sharp, mid/high, 8/10, continuous, some relief by taking aspirin. Denies long travel or trauma. May have sick contact, his house is close to a hospital. Had runny nose and cough x 2-3 wks. Was seen in [facility] and started on Abxs . His symptoms got worse so came here .      Problem/Plan - 1:  ·  Problem: Atrial flutter with FVR .  Plan: S/P  JOHN. < from: Transesophageal Echocardiogram w/o TTE (19 @ 14:04) >  Conclusions:  1. Left atrial enlargement.    Left atrial appendage  thrombus seen.   Decreased left atrial appendage velocities  noted.  2. Severe left ventricular enlargement.  3. Severe global left ventricular systolic dysfunction.  4. Severe right atrial enlargement.  5. Right ventricular enlargement with decreased right  ventricular systolic function.  6. Normal tricuspid valve. Moderate tricuspid  regurgitation.  7. Estimated pulmonary artery systolic pressure equals 37  mm Hg, assuming right atrial pressure equals 8 mm Hg,  consistent with borderline pulmonary pressures.  Reviewed in real time with Dr. Heard.    < end of copied text >   Rate control with BB and on AC . Doing well.   Cardiology and EP following so management per them. . TTE < from: TTE with Doppler (w/Cont) (19 @ 09:41) >  Conclusions:  Technically difficult study with limited/suboptimal views.  1. Normal left ventricular internal dimensions and wall  thicknesses.  2. Endocardium not well visualized despite the use of  definity contrast; grossly severe global left ventricular  systolic dysfunction. Endocardial visualization enhanced  with intravenous injection of Ultrasonic Enhancing Agent  (Definity).  3. The right ventricle is not well visualized; grossly  decreased right ventricular systolic function.  4. Estimated right ventricular systolic pressure equals 21  mm Hg, assuming right atrial pressure equals 8 mm Hg,  consistent with normal pulmonary pressures.  *** No previous Echo exam.    < end of copied text >  and TFT noted .Rate control with BB and on AC . Doing well.      Problem/Plan - 2:  ·  Problem:  Viral respiratory infection with persistent cough .  Plan: hmp virus. Supportive care. Cough improving.      Problem/Plan - 3:  ·  Problem: Chest pain.  Plan: Ischemic work up per cardiology.  ACS R/O and likely sec to A flutter as well viral infection.      Problem/Plan - 4:  ·  Problem: Acute systolic CHF with cardiomyopathy. .  Plan: Cardiology and EP helping. CTA noted. < from: CT Angio Chest w/ IV Cont (19 @ 16:31) >. Will check Doppler legs to R/O DVT. BNP elevated . On Lasix.  Markedly limited study.    No pulmonary embolism in the main, right, and left pulmonary arteries.   Limited evaluation of the lobar, segmental, and subsegmental pulmonary   arteries.      Problem/Plan - 5:  ·  Problem:  INNA.  Plan: Watching BMP . On Lasix.      Problem/Plan - 6:  Problem: Family history of hypercoagulability. Plan: Will need outpt Work up .     Problem/Plan - 7:  ·  Problem:  Obesity.  Plan: Weight loss .     Disposition : DC planning pending therapeutic INR.

## 2019-03-02 NOTE — CHART NOTE - NSCHARTNOTEFT_GEN_A_CORE
Nutrition Follow Up Note  Patient seen for: coumadin education. Reviewed sources of Vitamin K and consistency of intake. Verbalized understanding.    Chart reviewed, events noted. Adm dx: SOB, CP.    Source: pt, chart    Diet : DASH    Patient denies N/V, had BM today     PO intake : good     Source for PO intake: pt     Enteral /Parenteral Nutrition: n/a      Daily Weight in k (), Weight in k.2 (), Weight in k.2 (), Weight in k.1 (), Weight in k.5 (), Weight in k.5 (), Weight in k.8 ()  wt range 494-522 since adm, wt changes w/ fluid shifts, diuretics has 2+ B/L leg edema    Pertinent Medications: MEDICATIONS  (STANDING):  digoxin     Tablet 0.125 milliGRAM(s) Oral daily  furosemide   Injectable 40 milliGRAM(s) IV Push two times a day  heparin  Infusion. 2100 Unit(s)/Hr (21 mL/Hr) IV Continuous <Continuous>  influenza   Vaccine 0.5 milliLiter(s) IntraMuscular once  lisinopril 5 milliGRAM(s) Oral daily  metoprolol succinate ER 50 milliGRAM(s) Oral daily  metoprolol tartrate 25 milliGRAM(s) Oral once  warfarin 7.5 milliGRAM(s) Oral once    MEDICATIONS  (PRN):  heparin  Injectable 01021 Unit(s) IV Push every 6 hours PRN For aPTT less than 40  heparin  Injectable 5000 Unit(s) IV Push every 6 hours PRN For aPTT between 40 - 57     @ 09:25: Na 139, BUN 13, Cr 1.28, <H>, K+ 3.9, Phos --, Mg 2.1, Alk Phos --, ALT/SGPT --, AST/SGOT --, HbA1c --    Finger Sticks:      Skin per nursing documentation: no pressure injuries documented       Estimated Needs:   [x ] no change since previous assessment  [ ] recalculated  Previous Nutrition Diagnosis: food and nutrition related knowledge deficit  Nutrition Diagnosis is:   pt able to provide 3 teach back points from initial education,  stated that he knows to limit fast food, soda and fried food intake    New Nutrition Diagnosis: none  Related to:    As evidenced by:      Interventions:     Recommend  1) continue DASH diet    Monitoring and Evaluation:     Continue to monitor Nutritional intake, Tolerance to diet prescription, weights, labs, skin integrity    RD remains available upon request and will follow up per protocol

## 2019-03-02 NOTE — PROGRESS NOTE ADULT - SUBJECTIVE AND OBJECTIVE BOX
pt seen and examined, no complaints    TELE: Aflutter     digoxin     Tablet 0.125 milliGRAM(s) Oral daily  furosemide   Injectable 40 milliGRAM(s) IV Push two times a day  heparin  Infusion. 2100 Unit(s)/Hr IV Continuous <Continuous>  heparin  Injectable 19627 Unit(s) IV Push every 6 hours PRN  heparin  Injectable 5000 Unit(s) IV Push every 6 hours PRN  influenza   Vaccine 0.5 milliLiter(s) IntraMuscular once  lisinopril 5 milliGRAM(s) Oral daily  metoprolol succinate ER 50 milliGRAM(s) Oral daily  metoprolol tartrate 25 milliGRAM(s) Oral once  warfarin 7.5 milliGRAM(s) Oral once                            14.2   9.5   )-----------( 214      ( 02 Mar 2019 00:40 )             42.6       Hemoglobin: 14.2 g/dL (03-02 @ 00:40)  Hemoglobin: 13.9 g/dL (03-01 @ 10:18)  Hemoglobin: 13.2 g/dL (02-28 @ 09:41)  Hemoglobin: 13.6 g/dL (02-27 @ 17:56)  Hemoglobin: 13.7 g/dL (02-27 @ 08:36)      03-02    139  |  100  |  13  ----------------------------<  104<H>  3.9   |  26  |  1.28    Ca    9.5      02 Mar 2019 09:25  Mg     2.1     03-02      Creatinine Trend: 1.28<--, 1.22<--, 1.20<--, 1.23<--, 1.33<--, 1.33<--    COAGS: PT/INR - ( 02 Mar 2019 09:33 )   PT: 16.7 sec;   INR: 1.44 ratio         PTT - ( 02 Mar 2019 09:33 )  PTT:54.5 sec          T(C): 36.4 (03-02-19 @ 14:44), Max: 36.7 (03-01-19 @ 18:15)  HR: 91 (03-02-19 @ 14:44) (91 - 106)  BP: 137/65 (03-02-19 @ 14:44) (97/62 - 137/65)  RR: 16 (03-02-19 @ 14:44) (16 - 18)  SpO2: 96% (03-02-19 @ 14:44) (96% - 98%)  Wt(kg): --    I&O's Summary    01 Mar 2019 07:01  -  02 Mar 2019 07:00  --------------------------------------------------------  IN: 0 mL / OUT: 1250 mL / NET: -1250 mL    02 Mar 2019 07:01  -  02 Mar 2019 15:03  --------------------------------------------------------  IN: 720 mL / OUT: 700 mL / NET: 20 mL      Gastrointestinal:  Soft, Non-tender, + BS	  Skin: + erythema in LE bilaterally   Musculoskeletal: Normal range of motion, normal strength  Psychiatry:  Mood & affect appropriate      TELEMETRY: 	Aflutter     ECG:  	Aflutter, 92 , no acute Ischemia changes noted   RADIOLOGY: < from: CT Angio Chest w/ IV Cont (02.22.19 @ 16:31) >    IMPRESSION:     Markedly limited study.    No pulmonary embolism in the main, right, and left pulmonary arteries.   Limited evaluation of the lobar, segmental, and subsegmental pulmonary   arteries.  ECHO: < from: TTE with Doppler (w/Cont) (02.27.19 @ 09:41) >  ------------------------------------------------------------------------  Conclusions:  Technically difficult study with limited/suboptimal views.  1. Normal left ventricular internal dimensions and wall  thicknesses.  2. Endocardium not well visualized despite the use of  definity contrast; grossly severe global left ventricular  systolic dysfunction. Endocardial visualization enhanced  with intravenous injection of Ultrasonic Enhancing Agent  (Definity).  3. The right ventricle is not well visualized; grossly  decreased right ventricular systolic function.  4. Estimated right ventricular systolic pressure equals 21  mm Hg, assuming right atrial pressure equals 8 mm Hg,  consistent with normal pulmonary pressures.  *** No previous Echo exam.  ------------------------------------------------------------------------  Confirmed on  2/27/2019 - 12:11:35 by Tera Curiel M.D.  ---------    < end of copied text >              ASSESSMENT/PLAN: 58 yr male , no PHX , but family hx of PE , now SOB , CHF, Palpitation . Found to be in Aflutter , +RVP     -Events noted, (+) LA thrombus, DCCV cancelled  - A/C with heparin gtt , bridge to coumadin , INR 2-3   - D/C planning once INR 2-3  - Outpatient Sleep study  - No plans for ischemic eval at present, patient habitus prohibits this    Giuseppe Mcleod MD, [facility]  BEEPER [phone]

## 2019-03-03 LAB
ANION GAP SERPL CALC-SCNC: 15 MMOL/L — SIGNIFICANT CHANGE UP (ref 5–17)
APTT BLD: 73.7 SEC — HIGH (ref 27.5–36.3)
BUN SERPL-MCNC: 14 MG/DL — SIGNIFICANT CHANGE UP (ref 7–23)
CALCIUM SERPL-MCNC: 9.6 MG/DL — SIGNIFICANT CHANGE UP (ref 8.4–10.5)
CHLORIDE SERPL-SCNC: 97 MMOL/L — SIGNIFICANT CHANGE UP (ref 96–108)
CO2 SERPL-SCNC: 26 MMOL/L — SIGNIFICANT CHANGE UP (ref 22–31)
CREAT SERPL-MCNC: 1.24 MG/DL — SIGNIFICANT CHANGE UP (ref 0.5–1.3)
GLUCOSE SERPL-MCNC: 126 MG/DL — HIGH (ref 70–99)
HCT VFR BLD CALC: 47.8 % — SIGNIFICANT CHANGE UP (ref 39–50)
HGB BLD-MCNC: 14.5 G/DL — SIGNIFICANT CHANGE UP (ref 13–17)
INR BLD: 1.49 RATIO — HIGH (ref 0.88–1.16)
MCHC RBC-ENTMCNC: 28.2 PG — SIGNIFICANT CHANGE UP (ref 27–34)
MCHC RBC-ENTMCNC: 30.3 GM/DL — LOW (ref 32–36)
MCV RBC AUTO: 93 FL — SIGNIFICANT CHANGE UP (ref 80–100)
PLATELET # BLD AUTO: 249 K/UL — SIGNIFICANT CHANGE UP (ref 150–400)
POTASSIUM SERPL-MCNC: 4.6 MMOL/L — SIGNIFICANT CHANGE UP (ref 3.5–5.3)
POTASSIUM SERPL-SCNC: 4.6 MMOL/L — SIGNIFICANT CHANGE UP (ref 3.5–5.3)
PROTHROM AB SERPL-ACNC: 17.3 SEC — HIGH (ref 10–13.1)
RBC # BLD: 5.14 M/UL — SIGNIFICANT CHANGE UP (ref 4.2–5.8)
RBC # FLD: 13.7 % — SIGNIFICANT CHANGE UP (ref 10.3–14.5)
SODIUM SERPL-SCNC: 138 MMOL/L — SIGNIFICANT CHANGE UP (ref 135–145)
WBC # BLD: 7.95 K/UL — SIGNIFICANT CHANGE UP (ref 3.8–10.5)
WBC # FLD AUTO: 7.95 K/UL — SIGNIFICANT CHANGE UP (ref 3.8–10.5)

## 2019-03-03 RX ORDER — METOPROLOL TARTRATE 50 MG
100 TABLET ORAL DAILY
Refills: 0 | Status: DISCONTINUED | OUTPATIENT
Start: 2019-03-03 | End: 2019-03-07

## 2019-03-03 RX ORDER — WARFARIN SODIUM 2.5 MG/1
10 TABLET ORAL ONCE
Refills: 0 | Status: COMPLETED | OUTPATIENT
Start: 2019-03-03 | End: 2019-03-03

## 2019-03-03 RX ADMIN — Medication 100 MILLIGRAM(S): at 13:57

## 2019-03-03 RX ADMIN — LISINOPRIL 5 MILLIGRAM(S): 2.5 TABLET ORAL at 05:48

## 2019-03-03 RX ADMIN — HEPARIN SODIUM 2100 UNIT(S)/HR: 5000 INJECTION INTRAVENOUS; SUBCUTANEOUS at 07:59

## 2019-03-03 RX ADMIN — Medication 40 MILLIGRAM(S): at 17:09

## 2019-03-03 RX ADMIN — Medication 40 MILLIGRAM(S): at 05:48

## 2019-03-03 RX ADMIN — WARFARIN SODIUM 10 MILLIGRAM(S): 2.5 TABLET ORAL at 13:57

## 2019-03-03 RX ADMIN — Medication 0.12 MILLIGRAM(S): at 05:48

## 2019-03-03 NOTE — PROGRESS NOTE ADULT - SUBJECTIVE AND OBJECTIVE BOX
Patient denies CP, SOB Review of systems otherwise (-)      TELE: Aflutter     digoxin     Tablet 0.125 milliGRAM(s) Oral daily  furosemide   Injectable 40 milliGRAM(s) IV Push two times a day  heparin  Infusion. 2100 Unit(s)/Hr IV Continuous <Continuous>  heparin  Injectable 73030 Unit(s) IV Push every 6 hours PRN  heparin  Injectable 5000 Unit(s) IV Push every 6 hours PRN  influenza   Vaccine 0.5 milliLiter(s) IntraMuscular once  lisinopril 5 milliGRAM(s) Oral daily  metoprolol succinate  milliGRAM(s) Oral daily                            14.5   7.95  )-----------( 249      ( 03 Mar 2019 09:43 )             47.8       Hemoglobin: 14.5 g/dL (03-03 @ 09:43)  Hemoglobin: 13.8 g/dL (03-02 @ 15:53)  Hemoglobin: 14.2 g/dL (03-02 @ 00:40)  Hemoglobin: 13.9 g/dL (03-01 @ 10:18)  Hemoglobin: 13.2 g/dL (02-28 @ 09:41)      03-03    138  |  97  |  14  ----------------------------<  126<H>  4.6   |  26  |  1.24    Ca    9.6      03 Mar 2019 06:40  Mg     2.1     03-02      Creatinine Trend: 1.24<--, 1.28<--, 1.22<--, 1.20<--, 1.23<--, 1.33<--    COAGS: PT/INR - ( 03 Mar 2019 09:51 )   PT: 17.3 sec;   INR: 1.49 ratio         PTT - ( 03 Mar 2019 06:40 )  PTT:73.7 sec          T(C): 36.7 (03-03-19 @ 13:20), Max: 36.7 (03-03-19 @ 13:20)  HR: 54 (03-03-19 @ 13:20) (54 - 91)  BP: 100/58 (03-03-19 @ 13:20) (100/58 - 137/72)  RR: 16 (03-03-19 @ 13:20) (16 - 18)  SpO2: 96% (03-03-19 @ 13:20) (95% - 97%)  Wt(kg): --    I&O's Summary    02 Mar 2019 07:01  -  03 Mar 2019 07:00  --------------------------------------------------------  IN: 1446 mL / OUT: 2500 mL / NET: -1054 mL    03 Mar 2019 07:01  -  03 Mar 2019 14:10  --------------------------------------------------------  IN: 780 mL / OUT: 900 mL / NET: -120 mL      Gastrointestinal:  Soft, Non-tender, + BS	  Skin: + erythema in LE bilaterally   Musculoskeletal: Normal range of motion, normal strength  Psychiatry:  Mood & affect appropriate      TELEMETRY: 	Aflutter     ECG:  	Aflutter, 92 , no acute Ischemia changes noted   RADIOLOGY: < from: CT Angio Chest w/ IV Cont (02.22.19 @ 16:31) >    IMPRESSION:     Markedly limited study.    No pulmonary embolism in the main, right, and left pulmonary arteries.   Limited evaluation of the lobar, segmental, and subsegmental pulmonary   arteries.  ECHO: < from: TTE with Doppler (w/Cont) (02.27.19 @ 09:41) >  ------------------------------------------------------------------------  Conclusions:  Technically difficult study with limited/suboptimal views.  1. Normal left ventricular internal dimensions and wall  thicknesses.  2. Endocardium not well visualized despite the use of  definity contrast; grossly severe global left ventricular  systolic dysfunction. Endocardial visualization enhanced  with intravenous injection of Ultrasonic Enhancing Agent  (Definity).  3. The right ventricle is not well visualized; grossly  decreased right ventricular systolic function.  4. Estimated right ventricular systolic pressure equals 21  mm Hg, assuming right atrial pressure equals 8 mm Hg,  consistent with normal pulmonary pressures.  *** No previous Echo exam.  ------------------------------------------------------------------------  Confirmed on  2/27/2019 - 12:11:35 by Tera Curiel M.D.  ---------    < end of copied text >              ASSESSMENT/PLAN: 58 yr male , no PHX , but family hx of PE , now SOB , CHF, Palpitation . Found to be in Aflutter , +RVP     -Events noted, (+) LA thrombus, DCCV cancelled  - A/C with heparin gtt , bridge to coumadin , INR 2-3   - D/C planning once INR 2-3  - Outpatient Sleep study  - No plans for ischemic eval at present, patient habitus prohibits this  - anticipate can change to PO lasix in next 24 hrs    Giuseppe Mcleod MD, [facility]  BEEPER [phone]

## 2019-03-03 NOTE — PROGRESS NOTE ADULT - SUBJECTIVE AND OBJECTIVE BOX
pt seen and examined, no complaints, ROS - .     tele: Afib     digoxin     Tablet 0.125 milliGRAM(s) Oral daily  furosemide   Injectable 40 milliGRAM(s) IV Push two times a day  heparin  Infusion. 2100 Unit(s)/Hr IV Continuous <Continuous>  heparin  Injectable 01311 Unit(s) IV Push every 6 hours PRN  heparin  Injectable 5000 Unit(s) IV Push every 6 hours PRN  influenza   Vaccine 0.5 milliLiter(s) IntraMuscular once  lisinopril 5 milliGRAM(s) Oral daily  metoprolol succinate ER 50 milliGRAM(s) Oral daily  metoprolol tartrate 25 milliGRAM(s) Oral once                            13.8   6.29  )-----------( 210      ( 02 Mar 2019 15:53 )             44.7       Hemoglobin: 13.8 g/dL (03-02 @ 15:53)  Hemoglobin: 14.2 g/dL (03-02 @ 00:40)  Hemoglobin: 13.9 g/dL (03-01 @ 10:18)  Hemoglobin: 13.2 g/dL (02-28 @ 09:41)  Hemoglobin: 13.6 g/dL (02-27 @ 17:56)      03-03    138  |  97  |  14  ----------------------------<  126<H>  4.6   |  26  |  1.24    Ca    9.6      03 Mar 2019 06:40  Mg     2.1     03-02      Creatinine Trend: 1.24<--, 1.28<--, 1.22<--, 1.20<--, 1.23<--, 1.33<--    COAGS: PTT - ( 02 Mar 2019 23:09 )  PTT:89.0 sec          T(C): 36.6 (03-03-19 @ 05:25), Max: 36.6 (03-02-19 @ 21:04)  HR: 68 (03-03-19 @ 05:25) (55 - 91)  BP: 126/73 (03-03-19 @ 05:25) (120/79 - 137/72)  RR: 18 (03-03-19 @ 05:25) (16 - 18)  SpO2: 95% (03-03-19 @ 05:25) (95% - 97%)  Wt(kg): --    I&O's Summary    02 Mar 2019 07:01  -  03 Mar 2019 07:00  --------------------------------------------------------  IN: 1446 mL / OUT: 2500 mL / NET: -1054 mL        JVP 8  IRR, no murmurs  CTAB  soft nt/nd  no c/c/e    echo severe LV dysfunction  TSH unremarkable  a1c unremarkable      A/P) 59 y/o male PMH BERNARDINO admitted with 1 month of CHF type symptoms, and 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 I had a JOHN-ablation planned, but his weigth (504 pounds) exceeds the 400 pound weight limit of the EP lab table. In addition his weight exceeds the cath lab weight limit. Therefore the best secondary option is JOHN/DCCV, with long term plan of CTI ablation when his weight is < 400 pounds.     - pt found to have ALBINA clot , EPS procedure cancelled.   -continue heparin-coumadin until INR 2-3  -continue toprol-lisinopril-lasix for LV dysfunction  - keep net neg per cards   - we will arrange f/u with Sara after discharge  D/W Dr Heard

## 2019-03-03 NOTE — PROGRESS NOTE ADULT - SUBJECTIVE AND OBJECTIVE BOX
INTERVAL HPI/OVERNIGHT EVENTS: No new concerns.   Vital Signs Last 24 Hrs  T(C): 36.6 (03 Mar 2019 05:25), Max: 36.6 (02 Mar 2019 21:04)  T(F): 97.9 (03 Mar 2019 05:25), Max: 97.9 (02 Mar 2019 21:04)  HR: 68 (03 Mar 2019 05:25) (55 - 91)  BP: 126/73 (03 Mar 2019 05:25) (120/79 - 137/72)  BP(mean): --  RR: 18 (03 Mar 2019 05:25) (16 - 18)  SpO2: 95% (03 Mar 2019 05:25) (95% - 97%)  I&O's Summary    02 Mar 2019 07:01  -  03 Mar 2019 07:00  --------------------------------------------------------  IN: 1446 mL / OUT: 2500 mL / NET: -1054 mL    03 Mar 2019 07:01  -  03 Mar 2019 11:55  --------------------------------------------------------  IN: 0 mL / OUT: 600 mL / NET: -600 mL      MEDICATIONS  (STANDING):  digoxin     Tablet 0.125 milliGRAM(s) Oral daily  furosemide   Injectable 40 milliGRAM(s) IV Push two times a day  heparin  Infusion. 2100 Unit(s)/Hr (21 mL/Hr) IV Continuous <Continuous>  influenza   Vaccine 0.5 milliLiter(s) IntraMuscular once  lisinopril 5 milliGRAM(s) Oral daily  metoprolol succinate  milliGRAM(s) Oral daily    MEDICATIONS  (PRN):  heparin  Injectable 41932 Unit(s) IV Push every 6 hours PRN For aPTT less than 40  heparin  Injectable 5000 Unit(s) IV Push every 6 hours PRN For aPTT between 40 - 57    LABS:                        14.5   7.95  )-----------( 249      ( 03 Mar 2019 09:43 )             47.8     03-03    138  |  97  |  14  ----------------------------<  126<H>  4.6   |  26  |  1.24    Ca    9.6      03 Mar 2019 06:40  Mg     2.1     03-02      PT/INR - ( 03 Mar 2019 09:51 )   PT: 17.3 sec;   INR: 1.49 ratio         PTT - ( 03 Mar 2019 06:40 )  PTT:73.7 sec    CAPILLARY BLOOD GLUCOSE              REVIEW OF SYSTEMS:  CONSTITUTIONAL: No fever, weight loss, or fatigue  EYES: No eye pain, visual disturbances, or discharge  ENMT:  No difficulty hearing, tinnitus, vertigo; No sinus or throat pain  NECK: No pain or stiffness  RESPIRATORY: No cough, wheezing, chills or hemoptysis; No shortness of breath  CARDIOVASCULAR: No chest pain, palpitations, dizziness, or leg swelling  GASTROINTESTINAL: No abdominal or epigastric pain. No nausea, vomiting, or hematemesis; No diarrhea or constipation. No melena or hematochezia.  GENITOURINARY: No dysuria, frequency, hematuria, or incontinence  NEUROLOGICAL: No headaches, memory loss, loss of strength, numbness, or tremors      Consultant(s) Notes Reviewed:  [x ] YES  [ ] NO    PHYSICAL EXAM:  GENERAL: NAD, Obese ,not in any distress ,  HEAD:  Atraumatic, Normocephalic  EYES: EOMI, PERRLA, conjunctiva and sclera clear  ENMT: No tonsillar erythema, exudates, or enlargement; Moist mucous membranes, Good dentition, No lesions  NECK: Supple, No JVD, Normal thyroid  NERVOUS SYSTEM:  Alert & Oriented X3, No focal deficit   CHEST/LUNG: Good air entry bilateral with no  rales, rhonchi, wheezing, or rubs  HEART: Irregular rate and rhythm; No murmurs, rubs, or gallops  ABDOMEN: Soft, Nontender, Nondistended; Bowel sounds present  EXTREMITIES:  2+ Peripheral Pulses, No clubbing, cyanosis, or edema    Care Discussed with Consultants/Other Providers [ x] YES  [ ] NO

## 2019-03-03 NOTE — PROGRESS NOTE ADULT - ASSESSMENT
59 yo M with no significant PMH but has significant FH of PE (brother  of PE, sister had PE), genetic hypercoagulation (nephew), p/w 2 wk, SOB. Continuous, no improvement, cannot lie down, and has worsening b/l leg swelling. Started back pain, since yesterday, sharp, mid/high, 8/10, continuous, some relief by taking aspirin. Denies long travel or trauma. May have sick contact, his house is close to a hospital. Had runny nose and cough x 2-3 wks. Was seen in [facility] and started on Abxs . His symptoms got worse so came here .      Problem/Plan - 1:  ·  Problem: Atrial flutter with FVR .  Plan: S/P  JOHN. < from: Transesophageal Echocardiogram w/o TTE (19 @ 14:04) >  Conclusions:  1. Left atrial enlargement.    Left atrial appendage  thrombus seen.   Decreased left atrial appendage velocities  noted.  2. Severe left ventricular enlargement.  3. Severe global left ventricular systolic dysfunction.  4. Severe right atrial enlargement.  5. Right ventricular enlargement with decreased right  ventricular systolic function.  6. Normal tricuspid valve. Moderate tricuspid  regurgitation.  7. Estimated pulmonary artery systolic pressure equals 37  mm Hg, assuming right atrial pressure equals 8 mm Hg,  consistent with borderline pulmonary pressures.  Reviewed in real time with Dr. Heard.    < end of copied text >   Rate control with BB and on AC . Doing well.   Cardiology and EP following so management per them. . TTE < from: TTE with Doppler (w/Cont) (19 @ 09:41) >  Conclusions:  Technically difficult study with limited/suboptimal views.  1. Normal left ventricular internal dimensions and wall  thicknesses.  2. Endocardium not well visualized despite the use of  definity contrast; grossly severe global left ventricular  systolic dysfunction. Endocardial visualization enhanced  with intravenous injection of Ultrasonic Enhancing Agent  (Definity).  3. The right ventricle is not well visualized; grossly  decreased right ventricular systolic function.  4. Estimated right ventricular systolic pressure equals 21  mm Hg, assuming right atrial pressure equals 8 mm Hg,  consistent with normal pulmonary pressures.  *** No previous Echo exam.    < end of copied text >  and TFT noted .Rate control with BB and on AC . Doing well.      Problem/Plan - 2:  ·  Problem:  Viral respiratory infection with persistent cough .  Plan: Sec to hmp virus. Supportive care. Cough improving.      Problem/Plan - 3:  ·  Problem: Chest pain.  Plan: Ischemic work up per cardiology.  ACS R/O and likely sec to A flutter as well viral infection.      Problem/Plan - 4:  ·  Problem: Acute systolic CHF with cardiomyopathy. .  Plan: Cardiology and EP helping. CTA noted. < from: CT Angio Chest w/ IV Cont (19 @ 16:31) >. Will check Doppler legs to R/O DVT. BNP elevated . On Lasix.  Markedly limited study.    No pulmonary embolism in the main, right, and left pulmonary arteries.   Limited evaluation of the lobar, segmental, and subsegmental pulmonary   arteries.      Problem/Plan - 5:  ·  Problem:  INNA.  Plan: Watching BMP . On Lasix.      Problem/Plan - 6:  Problem: Family history of hypercoagulability. Plan: Will need outpt Work up .     Problem/Plan - 7:  ·  Problem:  Obesity.  Plan: Weight loss .     Disposition : DC planning pending therapeutic INR.

## 2019-03-04 LAB
ANION GAP SERPL CALC-SCNC: 17 MMOL/L — SIGNIFICANT CHANGE UP (ref 5–17)
APTT BLD: 74.9 SEC — HIGH (ref 27.5–36.3)
BUN SERPL-MCNC: 15 MG/DL — SIGNIFICANT CHANGE UP (ref 7–23)
CALCIUM SERPL-MCNC: 9.5 MG/DL — SIGNIFICANT CHANGE UP (ref 8.4–10.5)
CHLORIDE SERPL-SCNC: 97 MMOL/L — SIGNIFICANT CHANGE UP (ref 96–108)
CO2 SERPL-SCNC: 24 MMOL/L — SIGNIFICANT CHANGE UP (ref 22–31)
CREAT SERPL-MCNC: 1.16 MG/DL — SIGNIFICANT CHANGE UP (ref 0.5–1.3)
GLUCOSE SERPL-MCNC: 124 MG/DL — HIGH (ref 70–99)
HCT VFR BLD CALC: 47 % — SIGNIFICANT CHANGE UP (ref 39–50)
HGB BLD-MCNC: 14.6 G/DL — SIGNIFICANT CHANGE UP (ref 13–17)
INR BLD: 1.67 RATIO — HIGH (ref 0.88–1.16)
MCHC RBC-ENTMCNC: 28.9 PG — SIGNIFICANT CHANGE UP (ref 27–34)
MCHC RBC-ENTMCNC: 31.1 GM/DL — LOW (ref 32–36)
MCV RBC AUTO: 92.9 FL — SIGNIFICANT CHANGE UP (ref 80–100)
PLATELET # BLD AUTO: 275 K/UL — SIGNIFICANT CHANGE UP (ref 150–400)
POTASSIUM SERPL-MCNC: 4.2 MMOL/L — SIGNIFICANT CHANGE UP (ref 3.5–5.3)
POTASSIUM SERPL-SCNC: 4.2 MMOL/L — SIGNIFICANT CHANGE UP (ref 3.5–5.3)
PROTHROM AB SERPL-ACNC: 19.4 SEC — HIGH (ref 10–13.1)
RBC # BLD: 5.06 M/UL — SIGNIFICANT CHANGE UP (ref 4.2–5.8)
RBC # FLD: 13.9 % — SIGNIFICANT CHANGE UP (ref 10.3–14.5)
SODIUM SERPL-SCNC: 138 MMOL/L — SIGNIFICANT CHANGE UP (ref 135–145)
WBC # BLD: 7.95 K/UL — SIGNIFICANT CHANGE UP (ref 3.8–10.5)
WBC # FLD AUTO: 7.95 K/UL — SIGNIFICANT CHANGE UP (ref 3.8–10.5)

## 2019-03-04 RX ORDER — WARFARIN SODIUM 2.5 MG/1
10 TABLET ORAL ONCE
Refills: 0 | Status: COMPLETED | OUTPATIENT
Start: 2019-03-04 | End: 2019-03-04

## 2019-03-04 RX ADMIN — Medication 100 MILLIGRAM(S): at 05:18

## 2019-03-04 RX ADMIN — LISINOPRIL 5 MILLIGRAM(S): 2.5 TABLET ORAL at 05:18

## 2019-03-04 RX ADMIN — Medication 40 MILLIGRAM(S): at 18:10

## 2019-03-04 RX ADMIN — HEPARIN SODIUM 2100 UNIT(S)/HR: 5000 INJECTION INTRAVENOUS; SUBCUTANEOUS at 11:38

## 2019-03-04 RX ADMIN — Medication 0.12 MILLIGRAM(S): at 05:18

## 2019-03-04 RX ADMIN — WARFARIN SODIUM 10 MILLIGRAM(S): 2.5 TABLET ORAL at 11:37

## 2019-03-04 RX ADMIN — Medication 40 MILLIGRAM(S): at 05:19

## 2019-03-04 NOTE — PROGRESS NOTE ADULT - ASSESSMENT
57 yo M with no significant PMH but has significant FH of PE (brother  of PE, sister had PE), genetic hypercoagulation (nephew), p/w 2 wk, SOB. Continuous, no improvement, cannot lie down, and has worsening b/l leg swelling. Started back pain, since yesterday, sharp, mid/high, 8/10, continuous, some relief by taking aspirin. Denies long travel or trauma. May have sick contact, his house is close to a hospital. Had runny nose and cough x 2-3 wks. Was seen in [facility] and started on Abxs . His symptoms got worse so came here .      Problem/Plan - 1:  ·  Problem: Atrial flutter with FVR .  Plan: S/P  JOHN. < from: Transesophageal Echocardiogram w/o TTE (19 @ 14:04) >  Conclusions:  1. Left atrial enlargement.    Left atrial appendage  thrombus seen.   Decreased left atrial appendage velocities  noted.  2. Severe left ventricular enlargement.  3. Severe global left ventricular systolic dysfunction.  4. Severe right atrial enlargement.  5. Right ventricular enlargement with decreased right  ventricular systolic function.  6. Normal tricuspid valve. Moderate tricuspid  regurgitation.  7. Estimated pulmonary artery systolic pressure equals 37  mm Hg, assuming right atrial pressure equals 8 mm Hg,  consistent with borderline pulmonary pressures.  Reviewed in real time with Dr. Heard.    < end of copied text >   Rate control with BB and on AC . Doing well. Coumadin 10mg today.   Cardiology and EP following so management per them. . TTE < from: TTE with Doppler (w/Cont) (19 @ 09:41) >  Conclusions:  Technically difficult study with limited/suboptimal views.  1. Normal left ventricular internal dimensions and wall  thicknesses.  2. Endocardium not well visualized despite the use of  definity contrast; grossly severe global left ventricular  systolic dysfunction. Endocardial visualization enhanced  with intravenous injection of Ultrasonic Enhancing Agent  (Definity).  3. The right ventricle is not well visualized; grossly  decreased right ventricular systolic function.  4. Estimated right ventricular systolic pressure equals 21  mm Hg, assuming right atrial pressure equals 8 mm Hg,  consistent with normal pulmonary pressures.  *** No previous Echo exam.    < end of copied text >  and TFT noted .Rate control with BB and on AC . Doing well.      Problem/Plan - 2:  ·  Problem:  Viral respiratory infection with persistent cough .  Plan: Sec to hmp virus. Supportive care. Cough improving.      Problem/Plan - 3:  ·  Problem: Chest pain.  Plan: Ischemic work up per cardiology.  ACS R/O and likely sec to A flutter as well viral infection.      Problem/Plan - 4:  ·  Problem: Acute systolic CHF with cardiomyopathy. .  Plan: Cardiology and EP helping. CTA noted. < from: CT Angio Chest w/ IV Cont (19 @ 16:31) >. Will check Doppler legs to R/O DVT. BNP elevated . On Lasix.  Markedly limited study.    No pulmonary embolism in the main, right, and left pulmonary arteries.   Limited evaluation of the lobar, segmental, and subsegmental pulmonary   arteries.      Problem/Plan - 5:  ·  Problem:  INNA.  Plan: Watching BMP . On Lasix.      Problem/Plan - 6:  Problem: Family history of hypercoagulability. Plan: Will need outpt Work up .     Problem/Plan - 7:  ·  Problem:  Morbid Obesity.  Plan: Will get Nutritional consult.     Disposition : DC planning pending therapeutic INR.

## 2019-03-04 NOTE — PROGRESS NOTE ADULT - SUBJECTIVE AND OBJECTIVE BOX
pt seen and examined, NO acute distress on exam     digoxin     Tablet 0.125 milliGRAM(s) Oral daily  furosemide   Injectable 40 milliGRAM(s) IV Push two times a day  heparin  Infusion. 2100 Unit(s)/Hr IV Continuous <Continuous>  heparin  Injectable 68665 Unit(s) IV Push every 6 hours PRN  heparin  Injectable 5000 Unit(s) IV Push every 6 hours PRN  influenza   Vaccine 0.5 milliLiter(s) IntraMuscular once  lisinopril 5 milliGRAM(s) Oral daily  metoprolol succinate  milliGRAM(s) Oral daily                            14.5   7.95  )-----------( 249      ( 03 Mar 2019 09:43 )             47.8       Hemoglobin: 14.5 g/dL (03-03 @ 09:43)  Hemoglobin: 13.8 g/dL (03-02 @ 15:53)  Hemoglobin: 14.2 g/dL (03-02 @ 00:40)  Hemoglobin: 13.9 g/dL (03-01 @ 10:18)  Hemoglobin: 13.2 g/dL (02-28 @ 09:41)      03-04    138  |  97  |  15  ----------------------------<  124<H>  4.2   |  24  |  1.16    Ca    9.5      04 Mar 2019 05:51  Mg     2.1     03-02      Creatinine Trend: 1.16<--, 1.24<--, 1.28<--, 1.22<--, 1.20<--, 1.23<--    COAGS:           T(C): 36.3 (03-04-19 @ 05:17), Max: 36.7 (03-03-19 @ 13:20)  HR: 106 (03-04-19 @ 05:17) (54 - 106)  BP: 117/77 (03-04-19 @ 05:17) (100/58 - 131/77)  RR: 18 (03-04-19 @ 05:17) (16 - 19)  SpO2: 96% (03-04-19 @ 05:17) (94% - 96%)  Wt(kg): --    I&O's Summary    03 Mar 2019 07:01  -  04 Mar 2019 07:00  --------------------------------------------------------  IN: 1704 mL / OUT: 2400 mL / NET: -696 mL      JVP 8  IRR, no murmurs  CTAB  soft nt/nd  no c/c/e    echo severe LV dysfunction  TSH unremarkable  a1c unremarkable      A/P) 59 y/o male PMH BERNARDINO admitted with 1 month of CHF type symptoms, and 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 I had a JOHN-ablation planned, but his weigth (504 pounds) exceeds the 400 pound weight limit of the EP lab table. In addition his weight exceeds the cath lab weight limit. Therefore the best secondary option is JOHN/DCCV, with long term plan of CTI ablation when his weight is < 400 pounds.     - pt found to have ALBINA clot , EPS procedure cancelled.   -continue heparin-coumadin until INR 2-3  -continue toprol-lisinopril-lasix for LV dysfunction  - Cont Lasix   - we will arrange f/u with Dr Heard and Sara after discharge  D/W Dr Heard

## 2019-03-04 NOTE — PROGRESS NOTE ADULT - SUBJECTIVE AND OBJECTIVE BOX
EP ATTENDING    tele: AFL 90-100s    less dyspnea, no palpitations, no angina, no syncope    digoxin     Tablet 0.125 milliGRAM(s) Oral daily  furosemide   Injectable 40 milliGRAM(s) IV Push two times a day  heparin  Infusion. 2100 Unit(s)/Hr IV Continuous <Continuous>  heparin  Injectable 05170 Unit(s) IV Push every 6 hours PRN  heparin  Injectable 5000 Unit(s) IV Push every 6 hours PRN  influenza   Vaccine 0.5 milliLiter(s) IntraMuscular once  lisinopril 5 milliGRAM(s) Oral daily  metoprolol succinate  milliGRAM(s) Oral daily                            14.5   7.95  )-----------( 249      ( 03 Mar 2019 09:43 )             47.8       03-04    138  |  97  |  15  ----------------------------<  124<H>  4.2   |  24  |  1.16    Ca    9.5      04 Mar 2019 05:51  Mg     2.1     03-02        T(C): 36.3 (03-04-19 @ 05:17), Max: 36.7 (03-03-19 @ 13:20)  HR: 106 (03-04-19 @ 05:17) (54 - 106)  BP: 117/77 (03-04-19 @ 05:17) (100/58 - 131/77)  RR: 18 (03-04-19 @ 05:17) (16 - 19)  SpO2: 96% (03-04-19 @ 05:17) (94% - 96%)  Wt(kg): --    JVP 8  IRR, no murmurs  CTAB  soft nt/nd  no c/c/e    echo: severe LV dysfunction, with JOHN showing ALBINA thrombus    A/P) 57 y/o male PMH BERNARDINO admitted with 1 month of CHF type symptoms, and 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 I had a JOHN-ablation planned, but his weigth (504 pounds) exceeds the 400 pound weight limit of the EP lab table. In addition his weight exceeds the cath lab weight limit. Therefore the best secondary option was JOHN/DCCV, with long term plan of CTI ablation when his weight is < 400 pounds. But DCCV aborted because pre-procedure JOHN showed ALBINA thrombus    -continue heparin-coumadin for lifelong a/c until INR 2-3  -continue dig 0.125 and toprol xl 100  -continue lisinopril 10  -continue IV lasix until BUN/Cr mildly increases as assessing his volume status is difficult on exam  -eventual cath and CTI ablation when his weight is < 400 pounds  -may eventually require a primary prevention ICD if his LVEF doesn't improve after cath and ablation and at least 3 months of medical therapy  -f/u with [facility] within 2 weeks of discharge  -f/u with his PMD for INR testing

## 2019-03-05 LAB
ANION GAP SERPL CALC-SCNC: 14 MMOL/L — SIGNIFICANT CHANGE UP (ref 5–17)
APPEARANCE UR: ABNORMAL
APTT BLD: 68.2 SEC — HIGH (ref 27.5–36.3)
BILIRUB UR-MCNC: NEGATIVE — SIGNIFICANT CHANGE UP
BUN SERPL-MCNC: 19 MG/DL — SIGNIFICANT CHANGE UP (ref 7–23)
CALCIUM SERPL-MCNC: 9.6 MG/DL — SIGNIFICANT CHANGE UP (ref 8.4–10.5)
CHLORIDE SERPL-SCNC: 96 MMOL/L — SIGNIFICANT CHANGE UP (ref 96–108)
CO2 SERPL-SCNC: 26 MMOL/L — SIGNIFICANT CHANGE UP (ref 22–31)
COLOR SPEC: ABNORMAL
CREAT SERPL-MCNC: 1.41 MG/DL — HIGH (ref 0.5–1.3)
GLUCOSE SERPL-MCNC: 120 MG/DL — HIGH (ref 70–99)
GLUCOSE UR QL: ABNORMAL
HCT VFR BLD CALC: 46 % — SIGNIFICANT CHANGE UP (ref 39–50)
HGB BLD-MCNC: 14.3 G/DL — SIGNIFICANT CHANGE UP (ref 13–17)
INR BLD: 1.93 RATIO — HIGH (ref 0.88–1.16)
KETONES UR-MCNC: NEGATIVE — SIGNIFICANT CHANGE UP
LEUKOCYTE ESTERASE UR-ACNC: ABNORMAL
MAGNESIUM SERPL-MCNC: 2.3 MG/DL — SIGNIFICANT CHANGE UP (ref 1.6–2.6)
MCHC RBC-ENTMCNC: 28.6 PG — SIGNIFICANT CHANGE UP (ref 27–34)
MCHC RBC-ENTMCNC: 31.1 GM/DL — LOW (ref 32–36)
MCV RBC AUTO: 92 FL — SIGNIFICANT CHANGE UP (ref 80–100)
NITRITE UR-MCNC: NEGATIVE — SIGNIFICANT CHANGE UP
PH UR: 7 — SIGNIFICANT CHANGE UP (ref 5–8)
PHOSPHATE SERPL-MCNC: 3.7 MG/DL — SIGNIFICANT CHANGE UP (ref 2.5–4.5)
PLATELET # BLD AUTO: 286 K/UL — SIGNIFICANT CHANGE UP (ref 150–400)
POTASSIUM SERPL-MCNC: 4.1 MMOL/L — SIGNIFICANT CHANGE UP (ref 3.5–5.3)
POTASSIUM SERPL-SCNC: 4.1 MMOL/L — SIGNIFICANT CHANGE UP (ref 3.5–5.3)
PROT UR-MCNC: ABNORMAL
PROTHROM AB SERPL-ACNC: 22.7 SEC — HIGH (ref 10–13.1)
RBC # BLD: 5 M/UL — SIGNIFICANT CHANGE UP (ref 4.2–5.8)
RBC # FLD: 13.9 % — SIGNIFICANT CHANGE UP (ref 10.3–14.5)
SODIUM SERPL-SCNC: 136 MMOL/L — SIGNIFICANT CHANGE UP (ref 135–145)
SP GR SPEC: 1.03 — HIGH (ref 1.01–1.02)
UROBILINOGEN FLD QL: SIGNIFICANT CHANGE UP
WBC # BLD: 8.05 K/UL — SIGNIFICANT CHANGE UP (ref 3.8–10.5)
WBC # FLD AUTO: 8.05 K/UL — SIGNIFICANT CHANGE UP (ref 3.8–10.5)

## 2019-03-05 RX ORDER — HEPARIN SODIUM 5000 [USP'U]/ML
2100 INJECTION INTRAVENOUS; SUBCUTANEOUS
Qty: 25000 | Refills: 0 | Status: DISCONTINUED | OUTPATIENT
Start: 2019-03-05 | End: 2019-03-07

## 2019-03-05 RX ORDER — HEPARIN SODIUM 5000 [USP'U]/ML
5000 INJECTION INTRAVENOUS; SUBCUTANEOUS EVERY 6 HOURS
Refills: 0 | Status: DISCONTINUED | OUTPATIENT
Start: 2019-03-05 | End: 2019-03-07

## 2019-03-05 RX ORDER — HEPARIN SODIUM 5000 [USP'U]/ML
10000 INJECTION INTRAVENOUS; SUBCUTANEOUS EVERY 6 HOURS
Refills: 0 | Status: DISCONTINUED | OUTPATIENT
Start: 2019-03-05 | End: 2019-03-07

## 2019-03-05 RX ORDER — FUROSEMIDE 40 MG
80 TABLET ORAL DAILY
Refills: 0 | Status: DISCONTINUED | OUTPATIENT
Start: 2019-03-05 | End: 2019-03-06

## 2019-03-05 RX ORDER — WARFARIN SODIUM 2.5 MG/1
7.5 TABLET ORAL ONCE
Refills: 0 | Status: COMPLETED | OUTPATIENT
Start: 2019-03-05 | End: 2019-03-05

## 2019-03-05 RX ADMIN — Medication 40 MILLIGRAM(S): at 05:57

## 2019-03-05 RX ADMIN — Medication 0.12 MILLIGRAM(S): at 05:57

## 2019-03-05 RX ADMIN — LISINOPRIL 5 MILLIGRAM(S): 2.5 TABLET ORAL at 05:57

## 2019-03-05 RX ADMIN — HEPARIN SODIUM 2100 UNIT(S)/HR: 5000 INJECTION INTRAVENOUS; SUBCUTANEOUS at 09:31

## 2019-03-05 RX ADMIN — Medication 100 MILLIGRAM(S): at 05:57

## 2019-03-05 RX ADMIN — WARFARIN SODIUM 7.5 MILLIGRAM(S): 2.5 TABLET ORAL at 21:44

## 2019-03-05 NOTE — CONSULT NOTE ADULT - ASSESSMENT
57 y/o m with PMHx significant for morbid obesity, Atrial flutter, ALBINA thrombus, typical AFL, and newly diagnosed LV dysfunction. FH significant for PE (Brother and sister), and hypercoagulability (nephew) presented with x 1 episode of hematuria early this morning. As per patient, he has only had one prior episode like this in the past -- 02/2014 -- when he had a UTI. Admitted to frequency, urgency, and incomplete emptying early this morning. Hasn't had any sx since. Denied burning, fever, and/or chills. Denied any hx of nephrolithiasis and prostate issues. Denied any tobacco use. Is currently a , who lives a sedimentary lifestyle. In the toilet there was clear yellow urine.   1. Hematuria 2/2 possible nephrolithiasis   - KUB  - CT Urogram  - UA/Culture  - Urine Cytology  - C/W Monitoring of PT/PTT/INR Hematuria  - KUB  - CT Urogram  - UA/Culture  - Urine Cytology  - C/W Monitoring of PT/PTT/INR  - PVR 57 y/o m with PMHx significant for morbid obesity, newly diagnosed LV dysfunction, FH hypercoagulability on heparin drip with brief gross hematuria now resolved    - Outpatient workup for hematuria  - no acute  intervention  - UA/Culture  - Check post-void residual  - Urine Cytology  - discussed with Dr. Cormier

## 2019-03-05 NOTE — PROGRESS NOTE ADULT - ASSESSMENT
59 yo M with no significant PMH but has significant FH of PE (brother  of PE, sister had PE), genetic hypercoagulation (nephew), p/w 2 wk, SOB. Continuous, no improvement, cannot lie down, and has worsening b/l leg swelling. Started back pain, since yesterday, sharp, mid/high, 8/10, continuous, some relief by taking aspirin. Denies long travel or trauma. May have sick contact, his house is close to a hospital. Had runny nose and cough x 2-3 wks. Was seen in [facility] and started on Abxs . His symptoms got worse so came here .      Problem/Plan - 1:  ·  Problem: Atrial flutter with FVR .  Plan: S/P  JOHN. < from: Transesophageal Echocardiogram w/o TTE (19 @ 14:04) >  Conclusions:  1. Left atrial enlargement.    Left atrial appendage  thrombus seen.   Decreased left atrial appendage velocities  noted.  2. Severe left ventricular enlargement.  3. Severe global left ventricular systolic dysfunction.  4. Severe right atrial enlargement.  5. Right ventricular enlargement with decreased right  ventricular systolic function.  6. Normal tricuspid valve. Moderate tricuspid  regurgitation.  7. Estimated pulmonary artery systolic pressure equals 37  mm Hg, assuming right atrial pressure equals 8 mm Hg,  consistent with borderline pulmonary pressures.  Reviewed in real time with Dr. Heard.    < end of copied text >   Rate control with BB and on AC . Doing well. Coumadin 10mg today.   Cardiology and EP following so management per them. . TTE < from: TTE with Doppler (w/Cont) (19 @ 09:41) >  Conclusions:  Technically difficult study with limited/suboptimal views.  1. Normal left ventricular internal dimensions and wall  thicknesses.  2. Endocardium not well visualized despite the use of  definity contrast; grossly severe global left ventricular  systolic dysfunction. Endocardial visualization enhanced  with intravenous injection of Ultrasonic Enhancing Agent  (Definity).  3. The right ventricle is not well visualized; grossly  decreased right ventricular systolic function.  4. Estimated right ventricular systolic pressure equals 21  mm Hg, assuming right atrial pressure equals 8 mm Hg,  consistent with normal pulmonary pressures.  *** No previous Echo exam.    < end of copied text >  and TFT noted .Rate control with BB and on AC Heparin till INR Therapeutic. . Doing well.      Problem/Plan - 2:  ·  Problem:  Viral respiratory infection with persistent cough .  Plan: Sec to hmp virus. Supportive care. Cough improving.      Problem/Plan - 3:  ·  Problem: Chest pain.  Plan: Ischemic work up per cardiology.  ACS R/O and likely sec to A flutter as well viral infection.      Problem/Plan - 4:  ·  Problem: Acute systolic CHF with cardiomyopathy. .  Plan: Cardiology and EP helping. CTA noted. < from: CT Angio Chest w/ IV Cont (19 @ 16:31) >. Will check Doppler legs to R/O DVT. BNP elevated . On Lasix.  Markedly limited study.    No pulmonary embolism in the main, right, and left pulmonary arteries.   Limited evaluation of the lobar, segmental, and subsegmental pulmonary   arteries.      Problem/Plan - 5:  ·  Problem:  INNA.  Plan: Watching BMP . On Lasix. Creatinine up so renal consulted.      Problem/Plan - 6:  Problem: Family history of hypercoagulability. Plan: Will need outpt Work up .     Problem/Plan - 7:  ·  Problem:  Hematuria   Plan:  consulted.      Problem/Plan - 8:  ·  Problem:  Morbid Obesity.  Plan: Will get Nutritional consult.   Disposition : DC planning pending therapeutic INR.

## 2019-03-05 NOTE — PROGRESS NOTE ADULT - SUBJECTIVE AND OBJECTIVE BOX
pt seen and examined,  no chest pain or sob on exam     digoxin     Tablet 0.125 milliGRAM(s) Oral daily  furosemide   Injectable 40 milliGRAM(s) IV Push two times a day  heparin  Infusion. 2100 Unit(s)/Hr IV Continuous <Continuous>  heparin  Injectable 99750 Unit(s) IV Push every 6 hours PRN  heparin  Injectable 5000 Unit(s) IV Push every 6 hours PRN  influenza   Vaccine 0.5 milliLiter(s) IntraMuscular once  lisinopril 5 milliGRAM(s) Oral daily  metoprolol succinate  milliGRAM(s) Oral daily                            14.6   7.95  )-----------( 275      ( 04 Mar 2019 08:41 )             47.0       Hemoglobin: 14.6 g/dL (03-04 @ 08:41)  Hemoglobin: 14.5 g/dL (03-03 @ 09:43)  Hemoglobin: 13.8 g/dL (03-02 @ 15:53)  Hemoglobin: 14.2 g/dL (03-02 @ 00:40)  Hemoglobin: 13.9 g/dL (03-01 @ 10:18)      03-04    138  |  97  |  15  ----------------------------<  124<H>  4.2   |  24  |  1.16    Ca    9.5      04 Mar 2019 05:51      Creatinine Trend: 1.16<--, 1.24<--, 1.28<--, 1.22<--, 1.20<--, 1.23<--    COAGS: PT/INR - ( 04 Mar 2019 09:27 )   PT: 19.4 sec;   INR: 1.67 ratio         PTT - ( 04 Mar 2019 09:27 )  PTT:74.9 sec          T(C): 36.8 (03-05-19 @ 04:28), Max: 36.8 (03-05-19 @ 04:28)  HR: 97 (03-05-19 @ 04:28) (92 - 106)  BP: 100/56 (03-05-19 @ 04:28) (95/59 - 117/77)  RR: 18 (03-05-19 @ 04:28) (17 - 19)  SpO2: 95% (03-05-19 @ 04:28) (94% - 96%)  Wt(kg): --    I&O's Summary    03 Mar 2019 07:01  -  04 Mar 2019 07:00  --------------------------------------------------------  IN: 1704 mL / OUT: 2400 mL / NET: -696 mL    04 Mar 2019 07:01  -  05 Mar 2019 04:58  --------------------------------------------------------  IN: 960 mL / OUT: 1300 mL / NET: -340 mL          JVP 8  IRR, no murmurs  CTAB  soft nt/nd  no c/c/e    echo severe LV dysfunction  TSH unremarkable  a1c unremarkable      A/P) 57 y/o male PMH BERNARDINO admitted with 1 month of CHF type symptoms, and 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 I had a JOHN-ablation planned, but his weigth (504 pounds) exceeds the 400 pound weight limit of the EP lab table. In addition his weight exceeds the cath lab weight limit. Therefore the best secondary option is JOHN/DCCV, with long term plan of CTI ablation when his weight is < 400 pounds.     -EPS following , cont rate control for Afib    -continue heparin-coumadin until INR 2-3  -continue toprol-lisinopril-lasix for LV dysfunction  - Cont Lasix IV   - we will arrange f/u with Dr Heard and Sara after discharge  - Rate control with dig , BB  D/W Dr Mcleod

## 2019-03-05 NOTE — CONSULT NOTE ADULT - SUBJECTIVE AND OBJECTIVE BOX
57 y/o m with PMHx significant for morbid obesity, Atrial flutter, ALBINA thrombus, typical AFL, and newly diagnosed LV dysfunction, presented with x 1 episode of hematuria early this morning. As per patient, he has only had one prior episode like this in the past -- 02/2014 -- when he had a UTI. Admitted to frequency, urgency, and incomplete emptying early this morning. Hasn't had any sx since. Denied burning, fever, and/or chills. Denied any hx of nephrolithiasis and prostate issues. Denied any tobacco use. Is currently a , who lives a [employer] lifestyle. In the toilet there was clear yellow urine.     PAST MEDICAL & SURGICAL HISTORY:  Obesity  No significant past surgical history      MEDICATIONS  (STANDING):  digoxin     Tablet 0.125 milliGRAM(s) Oral daily  furosemide    Tablet 80 milliGRAM(s) Oral daily  heparin  Infusion. 2100 Unit(s)/Hr (21 mL/Hr) IV Continuous <Continuous>  influenza   Vaccine 0.5 milliLiter(s) IntraMuscular once  lisinopril 5 milliGRAM(s) Oral daily  metoprolol succinate  milliGRAM(s) Oral daily    MEDICATIONS  (PRN):  heparin  Injectable 42451 Unit(s) IV Push every 6 hours PRN For aPTT less than 40  heparin  Injectable 5000 Unit(s) IV Push every 6 hours PRN For aPTT between 40 - 57      FAMILY HISTORY:      Allergies    No Known Allergies    Intolerances        SOCIAL HISTORY: Denied EtOH and tobacco use.     REVIEW OF SYSTEMS: Otherwise negative as stated in HPI    Physical Exam  Vital signs  T(C): 36.8 (03-05-19 @ 04:28), Max: 36.8 (03-05-19 @ 04:28)  HR: 97 (03-05-19 @ 04:28)  BP: 100/56 (03-05-19 @ 04:28)  SpO2: 95% (03-05-19 @ 04:28)  Wt(kg): --    Output    UOP    Gen:  AWAKE ALERT NAD AXOX3    Pulm:  NO RESP DISTRESS  	  CV:  S1S2    GI:  SOFT NT/ND    :                            	      LABS:      03-05 @ 08:31    WBC 8.05  / Hct 46.0  / SCr --       03-05 @ 05:42    WBC --    / Hct --    / SCr 1.41     03-05    136  |  96  |  19  ----------------------------<  120<H>  4.1   |  26  |  1.41<H>    Ca    9.6      05 Mar 2019 05:42  Phos  3.7     03-05  Mg     2.3     03-05      PT/INR - ( 05 Mar 2019 07:51 )   PT: 22.7 sec;   INR: 1.93 ratio         PTT - ( 05 Mar 2019 07:51 )  PTT:68.2 sec      Urine Cx:  Blood Cx: 57 y/o m with PMHx significant for morbid obesity, Atrial flutter, ALBINA thrombus, typical AFL, and newly diagnosed LV dysfunction, presented with x 1 episode of hematuria early this morning. As per patient, he has only had one prior episode like this in the past -- 02/2014 -- when he had a UTI. Admitted to frequency, urgency, and incomplete emptying early this morning. Hasn't had any sx since. Denied burning, fever, and/or chills. Denied any hx of nephrolithiasis and prostate issues. Denied any tobacco use. Is currently a , who lives a [employer] lifestyle. In the toilet there was clear yellow urine.     PAST MEDICAL & SURGICAL HISTORY:  Obesity  No significant past surgical history      MEDICATIONS  (STANDING):  digoxin     Tablet 0.125 milliGRAM(s) Oral daily  furosemide    Tablet 80 milliGRAM(s) Oral daily  heparin  Infusion. 2100 Unit(s)/Hr (21 mL/Hr) IV Continuous <Continuous>  influenza   Vaccine 0.5 milliLiter(s) IntraMuscular once  lisinopril 5 milliGRAM(s) Oral daily  metoprolol succinate  milliGRAM(s) Oral daily    MEDICATIONS  (PRN):  heparin  Injectable 61293 Unit(s) IV Push every 6 hours PRN For aPTT less than 40  heparin  Injectable 5000 Unit(s) IV Push every 6 hours PRN For aPTT between 40 - 57      FAMILY HISTORY:      Allergies    No Known Allergies    Intolerances        SOCIAL HISTORY: Denied EtOH and tobacco use.     REVIEW OF SYSTEMS: Otherwise negative as stated in HPI    Physical Exam  Vital signs  T(C): 36.8 (03-05-19 @ 04:28), Max: 36.8 (03-05-19 @ 04:28)  HR: 97 (03-05-19 @ 04:28)  BP: 100/56 (03-05-19 @ 04:28)  SpO2: 95% (03-05-19 @ 04:28)  Wt(kg): --    Output    UOP    Gen: AWAKE ALERT NAD A&O x3    Pulm: No respiratory distress  CV: S1/S2  GI: Morbidly obese; soft non tender and non distended; No CVA tenderness; mild erythema between skin folds; Kidneys non palpable; absent suprapubic tenderness  : No discharge at meatus; retracted penis; tested palpated b/l non tender w/ zero masses; prostate palpated approx. 40g, smooth with no nodules palpated        LABS:    03-05 @ 08:31    WBC 8.05  / Hct 46.0  / SCr --       03-05 @ 05:42    WBC --    / Hct --    / SCr 1.41     03-05    136  |  96  |  19  ----------------------------<  120<H>  4.1   |  26  |  1.41<H>    Ca    9.6      05 Mar 2019 05:42  Phos  3.7     03-05  Mg     2.3     03-05      PT/INR - ( 05 Mar 2019 07:51 )   PT: 22.7 sec;   INR: 1.93 ratio         PTT - ( 05 Mar 2019 07:51 )  PTT:68.2 sec 57 y/o m with PMHx significant for morbid obesity, Atrial flutter, ALBINA thrombus, typical AFL, and newly diagnosed LV dysfunction. FH significant for pulmonary embolism (Brother and sister), and hypercoagulability (nephew) presented with x 1 episode of hematuria early this morning. As per patient, he has only had one prior episode like this in the past -- 02/2014 -- when he had a UTI. Admitted to frequency, urgency, and incomplete emptying early this morning. Hasn't had any sx since. Denied burning, fever, and/or chills. Denied any hx of nephrolithiasis and prostate issues. Denied any tobacco use. Is currently a , who lives a sedimentary lifestyle. In the toilet there was clear yellow urine.     PAST MEDICAL & SURGICAL HISTORY:  Obesity  No significant past surgical history      MEDICATIONS  (STANDING):  digoxin     Tablet 0.125 milliGRAM(s) Oral daily  furosemide    Tablet 80 milliGRAM(s) Oral daily  heparin  Infusion. 2100 Unit(s)/Hr (21 mL/Hr) IV Continuous <Continuous>  influenza   Vaccine 0.5 milliLiter(s) IntraMuscular once  lisinopril 5 milliGRAM(s) Oral daily  metoprolol succinate  milliGRAM(s) Oral daily    MEDICATIONS  (PRN):  heparin  Injectable 91354 Unit(s) IV Push every 6 hours PRN For aPTT less than 40  heparin  Injectable 5000 Unit(s) IV Push every 6 hours PRN For aPTT between 40 - 57      FAMILY HISTORY:      Allergies    No Known Allergies    Intolerances        SOCIAL HISTORY: Denied EtOH and tobacco use.     REVIEW OF SYSTEMS: Otherwise negative as stated in HPI    Physical Exam  Vital signs  T(C): 36.8 (03-05-19 @ 04:28), Max: 36.8 (03-05-19 @ 04:28)  HR: 97 (03-05-19 @ 04:28)  BP: 100/56 (03-05-19 @ 04:28)  SpO2: 95% (03-05-19 @ 04:28)  Wt(kg): --    Output    UOP    Gen: AWAKE ALERT NAD A&O x3    Pulm: No respiratory distress  CV: S1/S2  GI: Morbidly obese; soft non tender and non distended; No CVA tenderness; mild erythema between skin folds; Kidneys non palpable; absent suprapubic tenderness  : No discharge at meatus; retracted penis; testes palpated b/l non tender w/ zero masses; prostate palpated approx. 40g, smooth with no nodules palpated        LABS:    03-05 @ 08:31    WBC 8.05  / Hct 46.0  / SCr --       03-05 @ 05:42    WBC --    / Hct --    / SCr 1.41     03-05    136  |  96  |  19  ----------------------------<  120<H>  4.1   |  26  |  1.41<H>    Ca    9.6      05 Mar 2019 05:42  Phos  3.7     03-05  Mg     2.3     03-05      PT/INR - ( 05 Mar 2019 07:51 )   PT: 22.7 sec;   INR: 1.93 ratio         PTT - ( 05 Mar 2019 07:51 )  PTT:68.2 sec 57 y/o m with PMHx significant for morbid obesity, Atrial flutter, ALBINA thrombus, typical AFL, and newly diagnosed LV dysfunction. FH significant for pulmonary embolism (Brother and sister), and hypercoagulability (nephew) presented with x 1 episode of hematuria early this morning. As per patient, he has only had one prior episode like this in the past -- 02/2014 -- when he had a UTI. Admitted to frequency, urgency, and incomplete emptying early this morning. Hasn't had any sx since. Denied burning, fever, and/or chills. Denied any hx of nephrolithiasis and prostate issues. Denied any tobacco use. Is currently a , who lives a sedentary lifestyle. In the toilet there was clear yellow urine.     PAST MEDICAL & SURGICAL HISTORY:  Obesity  No significant past surgical history      MEDICATIONS  (STANDING):  digoxin     Tablet 0.125 milliGRAM(s) Oral daily  furosemide    Tablet 80 milliGRAM(s) Oral daily  heparin  Infusion. 2100 Unit(s)/Hr (21 mL/Hr) IV Continuous <Continuous>  influenza   Vaccine 0.5 milliLiter(s) IntraMuscular once  lisinopril 5 milliGRAM(s) Oral daily  metoprolol succinate  milliGRAM(s) Oral daily    MEDICATIONS  (PRN):  heparin  Injectable 68563 Unit(s) IV Push every 6 hours PRN For aPTT less than 40  heparin  Injectable 5000 Unit(s) IV Push every 6 hours PRN For aPTT between 40 - 57      FAMILY HISTORY:      Allergies    No Known Allergies    Intolerances        SOCIAL HISTORY: Denied EtOH and tobacco use.     REVIEW OF SYSTEMS: Otherwise negative as stated in HPI    Physical Exam  Vital signs  T(C): 36.8 (03-05-19 @ 04:28), Max: 36.8 (03-05-19 @ 04:28)  HR: 97 (03-05-19 @ 04:28)  BP: 100/56 (03-05-19 @ 04:28)  SpO2: 95% (03-05-19 @ 04:28)  Wt(kg): --    Output    UOP    Gen: AWAKE ALERT NAD A&O x3    Pulm: No respiratory distress  CV: S1/S2  GI: Morbidly obese; soft non tender and non distended; No CVA tenderness; mild erythema between skin folds; Kidneys non palpable; absent suprapubic tenderness  : No discharge at meatus; retracted penis; testes palpated b/l non tender w/ zero masses; prostate palpated approx. 40g, smooth with no nodules palpated        LABS:    03-05 @ 08:31    WBC 8.05  / Hct 46.0  / SCr --       03-05 @ 05:42    WBC --    / Hct --    / SCr 1.41     03-05    136  |  96  |  19  ----------------------------<  120<H>  4.1   |  26  |  1.41<H>    Ca    9.6      05 Mar 2019 05:42  Phos  3.7     03-05  Mg     2.3     03-05      PT/INR - ( 05 Mar 2019 07:51 )   PT: 22.7 sec;   INR: 1.93 ratio         PTT - ( 05 Mar 2019 07:51 )  PTT:68.2 sec 59 y/o m with PMHx significant for morbid obesity, Atrial flutter, ALBINA thrombus, and newly diagnosed LV dysfunction. FH significant for pulmonary embolism (Brother and sister), and hypercoagulability (nephew) currently on heparin drip presented with x 1 episode of hematuria early this morning. As per patient, he has only had one prior episode like this in the past -- 02/2014 -- when he had a UTI. Admitted to frequency, urgency, and incomplete emptying early this morning. Hasn't had any sx since. Denied burning, fever, and/or chills. Denied any hx of nephrolithiasis and prostate issues. Denied any tobacco use. Is currently a , who lives a sedentary lifestyle. In the toilet there was clear yellow urine.     PAST MEDICAL & SURGICAL HISTORY:  Obesity  No significant past surgical history      MEDICATIONS  (STANDING):  digoxin     Tablet 0.125 milliGRAM(s) Oral daily  furosemide    Tablet 80 milliGRAM(s) Oral daily  heparin  Infusion. 2100 Unit(s)/Hr (21 mL/Hr) IV Continuous <Continuous>  influenza   Vaccine 0.5 milliLiter(s) IntraMuscular once  lisinopril 5 milliGRAM(s) Oral daily  metoprolol succinate  milliGRAM(s) Oral daily    MEDICATIONS  (PRN):  heparin  Injectable 49855 Unit(s) IV Push every 6 hours PRN For aPTT less than 40  heparin  Injectable 5000 Unit(s) IV Push every 6 hours PRN For aPTT between 40 - 57      FAMILY HISTORY:      Allergies    No Known Allergies    Intolerances        SOCIAL HISTORY: Denied EtOH and tobacco use.     REVIEW OF SYSTEMS: Otherwise negative as stated in HPI    Physical Exam  Vital signs  T(C): 36.8 (03-05-19 @ 04:28), Max: 36.8 (03-05-19 @ 04:28)  HR: 97 (03-05-19 @ 04:28)  BP: 100/56 (03-05-19 @ 04:28)  SpO2: 95% (03-05-19 @ 04:28)  Wt(kg): --    Output    UOP    Gen: AWAKE ALERT NAD A&O x3    Pulm: No respiratory distress  CV: S1/S2  GI: Morbidly obese; soft non tender and non distended; No CVA tenderness; mild erythema between skin folds; Kidneys non palpable; absent suprapubic tenderness  : No discharge at meatus; retracted penis; testes palpated b/l non tender w/ zero masses; prostate palpated approx. 40g, smooth with no nodules palpated        LABS:    03-05 @ 08:31    WBC 8.05  / Hct 46.0  / SCr --       03-05 @ 05:42    WBC --    / Hct --    / SCr 1.41     03-05    136  |  96  |  19  ----------------------------<  120<H>  4.1   |  26  |  1.41<H>    Ca    9.6      05 Mar 2019 05:42  Phos  3.7     03-05  Mg     2.3     03-05      PT/INR - ( 05 Mar 2019 07:51 )   PT: 22.7 sec;   INR: 1.93 ratio         PTT - ( 05 Mar 2019 07:51 )  PTT:68.2 sec

## 2019-03-05 NOTE — CHART NOTE - NSCHARTNOTEFT_GEN_A_CORE
Nutrition Follow Up Note      Source: medical record, pt    Diet : DASH/TLC    Patient seen for nutrition consult for education. Medial chart reviewed/events noted. Pt reports very good appetite and PO intake continue this admission, consuming 100% of meals. Pt denies any GI distress, last BM yesterday. Pt was previously educated on general healthy eating, heart healthy nutrition therapy diet and Coumadin/Vitamin K interaction. Pt reports making healthy choices in house, has stopped drinking soda and avoiding concentrated sweets. Pt reports being very motivated, plans to prepare meals in bulk as well as use a healthy meal delivery service. Pt amenable to review and reinforcement, able to teach back main points- noted below. Provided patient with list of [facility] ambulatory nutrition clinics to follow up with after discharge.         Daily Weight in k.5 (), Weight in k.4 (), Weight in k.9 (), Weight in k (), Weight in k.2 (), Weight in k.2 (), Weight in k.1 ()- weight continues to trend down 2/2 fluid loss and dietary changes      Pertinent Medications: MEDICATIONS  (STANDING):  digoxin     Tablet 0.125 milliGRAM(s) Oral daily  furosemide    Tablet 80 milliGRAM(s) Oral daily  heparin  Infusion. 2100 Unit(s)/Hr (21 mL/Hr) IV Continuous <Continuous>  influenza   Vaccine 0.5 milliLiter(s) IntraMuscular once  lisinopril 5 milliGRAM(s) Oral daily  metoprolol succinate  milliGRAM(s) Oral daily    MEDICATIONS  (PRN):  heparin  Injectable 91530 Unit(s) IV Push every 6 hours PRN For aPTT less than 40  heparin  Injectable 5000 Unit(s) IV Push every 6 hours PRN For aPTT between 40 - 57    Pertinent Labs:  @ 05:42: Na 136, BUN 19, Cr 1.41<H>, <H>, K+ 4.1, Phos 3.7, Mg 2.3, Alk Phos --, ALT/SGPT --, AST/SGOT --, HbA1c --        Skin per nursing documentation:  no pressure ulcers  Edema: +1 bilateral ankle and leg edema     Estimated Needs:   [X ] no change since previous assessment  [ ] recalculated:     Previous Nutrition Diagnosis: Food and nutrition - related knowledge deficit   Nutrition Diagnosis is: improving, pt making healthier choices and able to teach back points     New Nutrition Diagnosis: N/A      Interventions:   Nutrition education: General healthy eating habits discussed with pt including balanced meals, proper portion sizes, and increased intake of lean protein, fruits, vegetables, and whole grains. Reinforced Coumadin education including: Coumadin/Vitamin K interaction, vitamin k points and serving sizes, and consistent vitamin K intake.     Recommend  1) Continue current diet  2) RD to follow up for further nutritional interventions as indicated/requested by medical team/pt.     Monitoring and Evaluation:     Continue to monitor Nutritional intake, Tolerance to diet prescription, weights, labs, skin integrity    RD remains available upon request and will follow up per protocol  Diane Agosto RD pager #[phone]

## 2019-03-05 NOTE — PROGRESS NOTE ADULT - SUBJECTIVE AND OBJECTIVE BOX
EP ATTENDING    tele: AFL 80s    no dyspnea, no palpitations, no angina, no syncope    + hematuria    digoxin     Tablet 0.125 milliGRAM(s) Oral daily  furosemide    Tablet 80 milliGRAM(s) Oral daily  heparin  Infusion. 2100 Unit(s)/Hr IV Continuous <Continuous>  heparin  Injectable 06252 Unit(s) IV Push every 6 hours PRN  heparin  Injectable 5000 Unit(s) IV Push every 6 hours PRN  influenza   Vaccine 0.5 milliLiter(s) IntraMuscular once  lisinopril 5 milliGRAM(s) Oral daily  metoprolol succinate  milliGRAM(s) Oral daily                            14.3   8.05  )-----------( 286      ( 05 Mar 2019 08:31 )             46.0       03-05    136  |  96  |  19  ----------------------------<  120<H>  4.1   |  26  |  1.41<H>    Ca    9.6      05 Mar 2019 05:42  Phos  3.7     03-05  Mg     2.3     03-05        T(C): 36.8 (03-05-19 @ 04:28), Max: 36.8 (03-05-19 @ 04:28)  HR: 97 (03-05-19 @ 04:28) (92 - 97)  BP: 100/56 (03-05-19 @ 04:28) (95/59 - 114/72)  RR: 18 (03-05-19 @ 04:28) (17 - 19)  SpO2: 95% (03-05-19 @ 04:28) (94% - 95%)  Wt(kg): --    JVP 8  IRR, no murmurs  CTAB  soft nt/nd  no c/c/e    echo: severe LV dysfunction, with JOHN showing ALBINA thrombus    A/P) 57 y/o male PMH BERNARDINO admitted with 1 month of CHF type symptoms, and 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 I had a JOHN-ablation planned, but his weigth (504 pounds) exceeds the 400 pound weight limit of the EP lab table. In addition his weight exceeds the cath lab weight limit. Therefore the best secondary option was JOHN/DCCV, with long term plan of CTI ablation when his weight is < 400 pounds. But DCCV aborted because pre-procedure JOHN showed ALBINA thrombus    -continue heparin-coumadin for lifelong a/c until INR 2-3  -continue dig 0.125 and toprol xl 100  -continue lisinopril 10  -change lasix to 80mg po daily  -eventual cath and CTI ablation when his weight is < 400 pounds  -may eventually require a primary prevention ICD if his LVEF doesn't improve after cath and ablation and at least 3 months of medical therapy  -f/u with [facility] within 2 weeks of discharge for establishment of cardiac care and INR testing   -urology consult for hematuria today  -expect d/c home tomorrow

## 2019-03-05 NOTE — PROGRESS NOTE ADULT - SUBJECTIVE AND OBJECTIVE BOX
INTERVAL HPI/OVERNIGHT EVENTS: Seen and examined with NP in room . My hematuria has resolved.   Vital Signs Last 24 Hrs  T(C): 36.8 (05 Mar 2019 13:47), Max: 36.8 (05 Mar 2019 04:28)  T(F): 98.3 (05 Mar 2019 13:47), Max: 98.3 (05 Mar 2019 13:47)  HR: 96 (05 Mar 2019 13:47) (92 - 97)  BP: 96/62 (05 Mar 2019 13:47) (95/59 - 114/72)  BP(mean): --  RR: 17 (05 Mar 2019 13:47) (17 - 19)  SpO2: 97% (05 Mar 2019 13:47) (95% - 97%)  I&O's Summary    04 Mar 2019 07:01  -  05 Mar 2019 07:00  --------------------------------------------------------  IN: 960 mL / OUT: 1975 mL / NET: -1015 mL    05 Mar 2019 07:01  -  05 Mar 2019 20:06  --------------------------------------------------------  IN: 1320 mL / OUT: 400 mL / NET: 920 mL      MEDICATIONS  (STANDING):  digoxin     Tablet 0.125 milliGRAM(s) Oral daily  furosemide    Tablet 80 milliGRAM(s) Oral daily  heparin  Infusion. 2100 Unit(s)/Hr (21 mL/Hr) IV Continuous <Continuous>  influenza   Vaccine 0.5 milliLiter(s) IntraMuscular once  lisinopril 5 milliGRAM(s) Oral daily  metoprolol succinate  milliGRAM(s) Oral daily  warfarin 7.5 milliGRAM(s) Oral once    MEDICATIONS  (PRN):  heparin  Injectable 18483 Unit(s) IV Push every 6 hours PRN For aPTT less than 40  heparin  Injectable 5000 Unit(s) IV Push every 6 hours PRN For aPTT between 40 - 57    LABS:                        14.3   8.05  )-----------( 286      ( 05 Mar 2019 08:31 )             46.0     03-05    136  |  96  |  19  ----------------------------<  120<H>  4.1   |  26  |  1.41<H>    Ca    9.6      05 Mar 2019 05:42  Phos  3.7     03-05  Mg     2.3     03-05      PT/INR - ( 05 Mar 2019 07:51 )   PT: 22.7 sec;   INR: 1.93 ratio         PTT - ( 05 Mar 2019 07:51 )  PTT:68.2 sec  Urinalysis Basic - ( 05 Mar 2019 09:37 )    Color: Dark Brown / Appearance: Turbid / S.031 / pH: x  Gluc: x / Ketone: Negative  / Bili: Negative / Urobili: <2 mg/dL   Blood: x / Protein: 300 mg/dL / Nitrite: Negative   Leuk Esterase: Moderate / RBC: >720 /HPF /  /HPF   Sq Epi: x / Non Sq Epi: 10 /HPF / Bacteria: Negative      CAPILLARY BLOOD GLUCOSE            Urinalysis Basic - ( 05 Mar 2019 09:37 )    Color: Dark Brown / Appearance: Turbid / S.031 / pH: x  Gluc: x / Ketone: Negative  / Bili: Negative / Urobili: <2 mg/dL   Blood: x / Protein: 300 mg/dL / Nitrite: Negative   Leuk Esterase: Moderate / RBC: >720 /HPF /  /HPF   Sq Epi: x / Non Sq Epi: 10 /HPF / Bacteria: Negative      REVIEW OF SYSTEMS:  CONSTITUTIONAL: No fever, weight loss, or fatigue  EYES: No eye pain, visual disturbances, or discharge  ENMT:  No difficulty hearing, tinnitus, vertigo; No sinus or throat pain  NECK: No pain or stiffness  BREASTS: No pain, masses, or nipple discharge  RESPIRATORY: No cough, wheezing, chills or hemoptysis; No shortness of breath  CARDIOVASCULAR: No chest pain, palpitations, dizziness, or leg swelling  GASTROINTESTINAL: No abdominal or epigastric pain. No nausea, vomiting, or hematemesis; No diarrhea or constipation. No melena or hematochezia.  GENITOURINARY:  hematuria,   NEUROLOGICAL: No headaches, memory loss, loss of strength, numbness, or tremors      Consultant(s) Notes Reviewed:  [x ] YES  [ ] NO    PHYSICAL EXAM:  GENERAL: NAD, Morbidly Obese not in any distress ,  HEAD:  Atraumatic, Normocephalic  EYES: EOMI, PERRLA, conjunctiva and sclera clear  ENMT: No tonsillar erythema, exudates, or enlargement; Moist mucous membranes, Good dentition, No lesions  NECK: Supple, No JVD, Normal thyroid  NERVOUS SYSTEM:  Alert & Oriented X3, No focal deficit   CHEST/LUNG: Good air entry bilateral with no  rales, rhonchi, wheezing, or rubs  HEART: Regular rate and rhythm; No murmurs, rubs, or gallops  ABDOMEN: Soft, Nontender, Nondistended; Bowel sounds present  EXTREMITIES:  2+ Peripheral Pulses, No clubbing, cyanosis, but less  edema      Care Discussed with Consultants/Other Providers [ x] YES  [ ] NO

## 2019-03-06 LAB
ANION GAP SERPL CALC-SCNC: 15 MMOL/L — SIGNIFICANT CHANGE UP (ref 5–17)
APPEARANCE UR: CLEAR — SIGNIFICANT CHANGE UP
APTT BLD: 129.2 SEC — CRITICAL HIGH (ref 27.5–36.3)
APTT BLD: 86.4 SEC — HIGH (ref 27.5–36.3)
APTT BLD: 89.1 SEC — HIGH (ref 27.5–36.3)
BACTERIA # UR AUTO: NEGATIVE — SIGNIFICANT CHANGE UP
BILIRUB UR-MCNC: NEGATIVE — SIGNIFICANT CHANGE UP
BUN SERPL-MCNC: 22 MG/DL — SIGNIFICANT CHANGE UP (ref 7–23)
CALCIUM SERPL-MCNC: 9.3 MG/DL — SIGNIFICANT CHANGE UP (ref 8.4–10.5)
CHLORIDE SERPL-SCNC: 97 MMOL/L — SIGNIFICANT CHANGE UP (ref 96–108)
CO2 SERPL-SCNC: 23 MMOL/L — SIGNIFICANT CHANGE UP (ref 22–31)
COLOR SPEC: YELLOW — SIGNIFICANT CHANGE UP
CREAT SERPL-MCNC: 1.28 MG/DL — SIGNIFICANT CHANGE UP (ref 0.5–1.3)
CULTURE RESULTS: SIGNIFICANT CHANGE UP
DIFF PNL FLD: ABNORMAL
DIFF PNL FLD: POSITIVE — SIGNIFICANT CHANGE UP
EPI CELLS # UR: 2 /HPF — SIGNIFICANT CHANGE UP (ref 0–5)
GLUCOSE SERPL-MCNC: 133 MG/DL — HIGH (ref 70–99)
GLUCOSE UR QL: NEGATIVE — SIGNIFICANT CHANGE UP
HCT VFR BLD CALC: 46.6 % — SIGNIFICANT CHANGE UP (ref 39–50)
HGB BLD-MCNC: 14.3 G/DL — SIGNIFICANT CHANGE UP (ref 13–17)
HYALINE CASTS # UR AUTO: 0 /LPF — SIGNIFICANT CHANGE UP (ref 0–7)
INR BLD: 1.83 RATIO — HIGH (ref 0.88–1.16)
INR BLD: 2.18 RATIO — HIGH (ref 0.88–1.16)
KETONES UR-MCNC: NEGATIVE — SIGNIFICANT CHANGE UP
LEUKOCYTE ESTERASE UR-ACNC: ABNORMAL
MCHC RBC-ENTMCNC: 28.7 PG — SIGNIFICANT CHANGE UP (ref 27–34)
MCHC RBC-ENTMCNC: 30.7 GM/DL — LOW (ref 32–36)
MCV RBC AUTO: 93.4 FL — SIGNIFICANT CHANGE UP (ref 80–100)
NITRITE UR-MCNC: NEGATIVE — SIGNIFICANT CHANGE UP
PH UR: 6 — SIGNIFICANT CHANGE UP (ref 5–8)
PLATELET # BLD AUTO: 265 K/UL — SIGNIFICANT CHANGE UP (ref 150–400)
POTASSIUM SERPL-MCNC: 4.1 MMOL/L — SIGNIFICANT CHANGE UP (ref 3.5–5.3)
POTASSIUM SERPL-SCNC: 4.1 MMOL/L — SIGNIFICANT CHANGE UP (ref 3.5–5.3)
PROT UR-MCNC: SIGNIFICANT CHANGE UP
PROTHROM AB SERPL-ACNC: 21.4 SEC — HIGH (ref 10–12.9)
PROTHROM AB SERPL-ACNC: 25.7 SEC — HIGH (ref 10–12.9)
RBC # BLD: 4.99 M/UL — SIGNIFICANT CHANGE UP (ref 4.2–5.8)
RBC # FLD: 13.8 % — SIGNIFICANT CHANGE UP (ref 10.3–14.5)
RBC CASTS # UR COMP ASSIST: 11 /HPF — HIGH (ref 0–4)
SODIUM SERPL-SCNC: 135 MMOL/L — SIGNIFICANT CHANGE UP (ref 135–145)
SP GR SPEC: 1.02 — SIGNIFICANT CHANGE UP (ref 1.01–1.02)
SPECIMEN SOURCE: SIGNIFICANT CHANGE UP
UROBILINOGEN FLD QL: SIGNIFICANT CHANGE UP
WBC # BLD: 7.95 K/UL — SIGNIFICANT CHANGE UP (ref 3.8–10.5)
WBC # FLD AUTO: 7.95 K/UL — SIGNIFICANT CHANGE UP (ref 3.8–10.5)
WBC UR QL: 28 /HPF — HIGH (ref 0–5)

## 2019-03-06 RX ORDER — WARFARIN SODIUM 2.5 MG/1
10 TABLET ORAL ONCE
Refills: 0 | Status: DISCONTINUED | OUTPATIENT
Start: 2019-03-06 | End: 2019-03-06

## 2019-03-06 RX ORDER — FUROSEMIDE 40 MG
40 TABLET ORAL
Refills: 0 | Status: DISCONTINUED | OUTPATIENT
Start: 2019-03-06 | End: 2019-03-07

## 2019-03-06 RX ORDER — WARFARIN SODIUM 2.5 MG/1
10 TABLET ORAL ONCE
Refills: 0 | Status: COMPLETED | OUTPATIENT
Start: 2019-03-06 | End: 2019-03-06

## 2019-03-06 RX ADMIN — Medication 0.12 MILLIGRAM(S): at 05:35

## 2019-03-06 RX ADMIN — WARFARIN SODIUM 10 MILLIGRAM(S): 2.5 TABLET ORAL at 10:24

## 2019-03-06 RX ADMIN — HEPARIN SODIUM 1700 UNIT(S)/HR: 5000 INJECTION INTRAVENOUS; SUBCUTANEOUS at 22:11

## 2019-03-06 RX ADMIN — HEPARIN SODIUM 1700 UNIT(S)/HR: 5000 INJECTION INTRAVENOUS; SUBCUTANEOUS at 06:39

## 2019-03-06 RX ADMIN — Medication 80 MILLIGRAM(S): at 05:35

## 2019-03-06 RX ADMIN — LISINOPRIL 5 MILLIGRAM(S): 2.5 TABLET ORAL at 05:35

## 2019-03-06 RX ADMIN — HEPARIN SODIUM 1700 UNIT(S)/HR: 5000 INJECTION INTRAVENOUS; SUBCUTANEOUS at 14:55

## 2019-03-06 RX ADMIN — Medication 100 MILLIGRAM(S): at 05:35

## 2019-03-06 RX ADMIN — Medication 40 MILLIGRAM(S): at 18:13

## 2019-03-06 RX ADMIN — HEPARIN SODIUM 0 UNIT(S)/HR: 5000 INJECTION INTRAVENOUS; SUBCUTANEOUS at 05:34

## 2019-03-06 NOTE — PROGRESS NOTE ADULT - SUBJECTIVE AND OBJECTIVE BOX
EP ATTENDING    tele: AFL 80-90s    no dyspnea, no palpitations, no angina, no syncope    digoxin     Tablet 0.125 milliGRAM(s) Oral daily  furosemide    Tablet 80 milliGRAM(s) Oral daily  heparin  Infusion. 2100 Unit(s)/Hr IV Continuous <Continuous>  heparin  Injectable 69247 Unit(s) IV Push every 6 hours PRN  heparin  Injectable 5000 Unit(s) IV Push every 6 hours PRN  influenza   Vaccine 0.5 milliLiter(s) IntraMuscular once  lisinopril 5 milliGRAM(s) Oral daily  metoprolol succinate  milliGRAM(s) Oral daily  warfarin 10 milliGRAM(s) Oral once                            14.3   8.05  )-----------( 286      ( 05 Mar 2019 08:31 )             46.0       03-06    135  |  97  |  22  ----------------------------<  133<H>  4.1   |  23  |  1.28    Ca    9.3      06 Mar 2019 04:41  Phos  3.7     03-05  Mg     2.3     03-05      T(C): 36.9 (03-06-19 @ 04:43), Max: 36.9 (03-06-19 @ 04:43)  HR: 96 (03-06-19 @ 04:43) (88 - 96)  BP: 112/65 (03-06-19 @ 04:43) (95/64 - 112/65)  RR: 18 (03-06-19 @ 04:43) (17 - 19)  SpO2: 95% (03-06-19 @ 04:43) (95% - 97%)  Wt(kg): --             JVP 8  IRR, no murmurs  CTAB  soft nt/nd  no c/c/e    echo: severe LV dysfunction, with JOHN showing ALBINA thrombus    A/P) 59 y/o male PMH BERNARDINO admitted with 1 month of CHF type symptoms, and 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 I had a JOHN-ablation planned, but his weigth (504 pounds) exceeds the 400 pound weight limit of the EP lab table. In addition his weight exceeds the cath lab weight limit. Therefore the best secondary option was JOHN/DCCV, with long term plan of CTI ablation when his weight is < 400 pounds. But DCCV aborted because pre-procedure JOHN showed ALBINA thrombus    -continue heparin-coumadin for lifelong a/c until INR 2-3  -continue dig 0.125 and toprol xl 100  -continue lisinopril 10  -continue lasix 80mg po daily  -eventual cath and CTI ablation when his weight is < 400 pounds  -may eventually require a primary prevention ICD if his LVEF doesn't improve after cath and ablation and at least 3 months of medical therapy  -f/u with [facility] within 2 weeks of discharge for establishment of cardiac care and INR testing   -expect d/c home tomorrow if INR 2-3

## 2019-03-06 NOTE — CHART NOTE - NSCHARTNOTEFT_GEN_A_CORE
Pt was seen and examined.  Briefly this is a morbidly obese male c hx of glucose intolerance/DM pw CHF   Renal azotemia  Volume overloaded    Suggest  -Outpt work up with Dr Jung Cormier  -Change Lasix 40mg po bid   -Cont ACE  -Wt loss        Sayed Elizabeth  [facility]  [phone]

## 2019-03-06 NOTE — PROGRESS NOTE ADULT - SUBJECTIVE AND OBJECTIVE BOX
INTERVAL HPI/OVERNIGHT EVENTS: i feel fine.   Vital Signs Last 24 Hrs  T(C): 36.3 (06 Mar 2019 13:04), Max: 36.9 (06 Mar 2019 04:43)  T(F): 97.4 (06 Mar 2019 13:04), Max: 98.5 (06 Mar 2019 04:43)  HR: 88 (06 Mar 2019 13:04) (88 - 96)  BP: 115/67 (06 Mar 2019 13:04) (95/64 - 115/67)  BP(mean): --  RR: 17 (06 Mar 2019 13:04) (17 - 19)  SpO2: 95% (06 Mar 2019 13:04) (95% - 95%)  I&O's Summary    05 Mar 2019 07:01  -  06 Mar 2019 07:00  --------------------------------------------------------  IN: 1320 mL / OUT: 1600 mL / NET: -280 mL    06 Mar 2019 07:01  -  06 Mar 2019 16:35  --------------------------------------------------------  IN: 720 mL / OUT: 600 mL / NET: 120 mL      MEDICATIONS  (STANDING):  digoxin     Tablet 0.125 milliGRAM(s) Oral daily  furosemide    Tablet 40 milliGRAM(s) Oral two times a day  heparin  Infusion. 2100 Unit(s)/Hr (21 mL/Hr) IV Continuous <Continuous>  influenza   Vaccine 0.5 milliLiter(s) IntraMuscular once  lisinopril 5 milliGRAM(s) Oral daily  metoprolol succinate  milliGRAM(s) Oral daily    MEDICATIONS  (PRN):  heparin  Injectable 71579 Unit(s) IV Push every 6 hours PRN For aPTT less than 40  heparin  Injectable 5000 Unit(s) IV Push every 6 hours PRN For aPTT between 40 - 57    LABS:                        14.3   7.95  )-----------( 265      ( 06 Mar 2019 10:07 )             46.6     03-06    135  |  97  |  22  ----------------------------<  133<H>  4.1   |  23  |  1.28    Ca    9.3      06 Mar 2019 04:41  Phos  3.7     03-05  Mg     2.3     03-05      PT/INR - ( 06 Mar 2019 04:41 )   PT: 21.4 sec;   INR: 1.83 ratio         PTT - ( 06 Mar 2019 12:56 )  PTT:89.1 sec  Urinalysis Basic - ( 05 Mar 2019 22:38 )    Color: Yellow / Appearance: Clear / S.024 / pH: x  Gluc: x / Ketone: Negative  / Bili: Negative / Urobili: <2 mg/dL   Blood: x / Protein: Trace / Nitrite: Negative   Leuk Esterase: Large / RBC: 11 /HPF / WBC 28 /HPF   Sq Epi: x / Non Sq Epi: 2 /HPF / Bacteria: Negative      CAPILLARY BLOOD GLUCOSE            Urinalysis Basic - ( 05 Mar 2019 22:38 )    Color: Yellow / Appearance: Clear / S.024 / pH: x  Gluc: x / Ketone: Negative  / Bili: Negative / Urobili: <2 mg/dL   Blood: x / Protein: Trace / Nitrite: Negative   Leuk Esterase: Large / RBC: 11 /HPF / WBC 28 /HPF   Sq Epi: x / Non Sq Epi: 2 /HPF / Bacteria: Negative      REVIEW OF SYSTEMS:  CONSTITUTIONAL: No fever, weight loss, or fatigue  EYES: No eye pain, visual disturbances, or discharge  ENMT:  No difficulty hearing, tinnitus, vertigo; No sinus or throat pain  NECK: No pain or stiffness  RESPIRATORY: No cough, wheezing, chills or hemoptysis; No shortness of breath  CARDIOVASCULAR: No chest pain, palpitations, dizziness, or leg swelling  GASTROINTESTINAL: No abdominal or epigastric pain. No nausea, vomiting, or hematemesis; No diarrhea or constipation. No melena or hematochezia.  GENITOURINARY: No dysuria, frequency, hematuria, or incontinence  NEUROLOGICAL: No headaches, memory loss, loss of strength, numbness, or tremors      Consultant(s) Notes Reviewed:  [x ] YES  [ ] NO    PHYSICAL EXAM:  GENERAL: NAD, well-groomed, well-developed ,not in any distress ,  HEAD:  Atraumatic, Normocephalic  EYES: EOMI, PERRLA, conjunctiva and sclera clear  NECK: Supple, No JVD, Normal thyroid   NERVOUS SYSTEM:  Alert & Oriented X3, No focal deficit   CHEST/LUNG: Good air entry bilateral with no  rales, rhonchi, wheezing, or rubs  HEART: Regular rate and rhythm; No murmurs, rubs, or gallops  ABDOMEN: Soft, Nontender, Nondistended; Bowel sounds present  EXTREMITIES:  2+  edema     Care Discussed with Consultants/Other Providers [ x] YES  [ ] NO INTERVAL HPI/OVERNIGHT EVENTS: i feel fine.   Vital Signs Last 24 Hrs  T(C): 36.3 (06 Mar 2019 13:04), Max: 36.9 (06 Mar 2019 04:43)  T(F): 97.4 (06 Mar 2019 13:04), Max: 98.5 (06 Mar 2019 04:43)  HR: 88 (06 Mar 2019 13:04) (88 - 96)  BP: 115/67 (06 Mar 2019 13:04) (95/64 - 115/67)  BP(mean): --  RR: 17 (06 Mar 2019 13:04) (17 - 19)  SpO2: 95% (06 Mar 2019 13:04) (95% - 95%)  I&O's Summary    05 Mar 2019 07:01  -  06 Mar 2019 07:00  --------------------------------------------------------  IN: 1320 mL / OUT: 1600 mL / NET: -280 mL    06 Mar 2019 07:01  -  06 Mar 2019 16:35  --------------------------------------------------------  IN: 720 mL / OUT: 600 mL / NET: 120 mL      MEDICATIONS  (STANDING):  digoxin     Tablet 0.125 milliGRAM(s) Oral daily  furosemide    Tablet 40 milliGRAM(s) Oral two times a day  heparin  Infusion. 2100 Unit(s)/Hr (21 mL/Hr) IV Continuous <Continuous>  influenza   Vaccine 0.5 milliLiter(s) IntraMuscular once  lisinopril 5 milliGRAM(s) Oral daily  metoprolol succinate  milliGRAM(s) Oral daily    MEDICATIONS  (PRN):  heparin  Injectable 19206 Unit(s) IV Push every 6 hours PRN For aPTT less than 40  heparin  Injectable 5000 Unit(s) IV Push every 6 hours PRN For aPTT between 40 - 57    LABS:                        14.3   7.95  )-----------( 265      ( 06 Mar 2019 10:07 )             46.6     03-06    135  |  97  |  22  ----------------------------<  133<H>  4.1   |  23  |  1.28    Ca    9.3      06 Mar 2019 04:41  Phos  3.7     03-05  Mg     2.3     03-05      PT/INR - ( 06 Mar 2019 04:41 )   PT: 21.4 sec;   INR: 1.83 ratio         PTT - ( 06 Mar 2019 12:56 )  PTT:89.1 sec  Urinalysis Basic - ( 05 Mar 2019 22:38 )    Color: Yellow / Appearance: Clear / S.024 / pH: x  Gluc: x / Ketone: Negative  / Bili: Negative / Urobili: <2 mg/dL   Blood: x / Protein: Trace / Nitrite: Negative   Leuk Esterase: Large / RBC: 11 /HPF / WBC 28 /HPF   Sq Epi: x / Non Sq Epi: 2 /HPF / Bacteria: Negative      CAPILLARY BLOOD GLUCOSE            Urinalysis Basic - ( 05 Mar 2019 22:38 )    Color: Yellow / Appearance: Clear / S.024 / pH: x  Gluc: x / Ketone: Negative  / Bili: Negative / Urobili: <2 mg/dL   Blood: x / Protein: Trace / Nitrite: Negative   Leuk Esterase: Large / RBC: 11 /HPF / WBC 28 /HPF   Sq Epi: x / Non Sq Epi: 2 /HPF / Bacteria: Negative      REVIEW OF SYSTEMS:  CONSTITUTIONAL: No fever, weight loss, or fatigue  EYES: No eye pain, visual disturbances, or discharge  ENMT:  No difficulty hearing, tinnitus, vertigo; No sinus or throat pain  NECK: No pain or stiffness  RESPIRATORY: No cough, wheezing, chills or hemoptysis; No shortness of breath  CARDIOVASCULAR: No chest pain, palpitations, dizziness, or leg swelling  GASTROINTESTINAL: No abdominal or epigastric pain. No nausea, vomiting, or hematemesis; No diarrhea or constipation. No melena or hematochezia.  GENITOURINARY: No dysuria, frequency, hematuria, or incontinence  NEUROLOGICAL: No headaches, memory loss, loss of strength, numbness, or tremors      Consultant(s) Notes Reviewed:  [x ] YES  [ ] NO    PHYSICAL EXAM:  GENERAL: NAD, Morbidly Obese ,not in any distress ,  HEAD:  Atraumatic, Normocephalic  EYES: EOMI, PERRLA, conjunctiva and sclera clear  NECK: Supple, No JVD, Normal thyroid   NERVOUS SYSTEM:  Alert & Oriented X3, No focal deficit   CHEST/LUNG: Good air entry bilateral with no  rales, rhonchi, wheezing, or rubs  HEART: Irregular rate and rhythm; No murmurs, rubs, or gallops  ABDOMEN: Soft, Nontender, Nondistended; Bowel sounds present  EXTREMITIES:  2+  edema     Care Discussed with Consultants/Other Providers [ x] YES  [ ] NO

## 2019-03-06 NOTE — PROGRESS NOTE ADULT - SUBJECTIVE AND OBJECTIVE BOX
pt seen and examined,  no chest pain or sob on exam     digoxin     Tablet 0.125 milliGRAM(s) Oral daily  furosemide    Tablet 80 milliGRAM(s) Oral daily  heparin  Infusion. 2100 Unit(s)/Hr IV Continuous <Continuous>  heparin  Injectable 73412 Unit(s) IV Push every 6 hours PRN  heparin  Injectable 5000 Unit(s) IV Push every 6 hours PRN  influenza   Vaccine 0.5 milliLiter(s) IntraMuscular once  lisinopril 5 milliGRAM(s) Oral daily  metoprolol succinate  milliGRAM(s) Oral daily                            14.3   8.05  )-----------( 286      ( 05 Mar 2019 08:31 )             46.0       Hemoglobin: 14.3 g/dL (03-05 @ 08:31)  Hemoglobin: 14.6 g/dL (03-04 @ 08:41)  Hemoglobin: 14.5 g/dL (03-03 @ 09:43)  Hemoglobin: 13.8 g/dL (03-02 @ 15:53)  Hemoglobin: 14.2 g/dL (03-02 @ 00:40)      03-06    135  |  97  |  22  ----------------------------<  133<H>  4.1   |  23  |  1.28    Ca    9.3      06 Mar 2019 04:41  Phos  3.7     03-05  Mg     2.3     03-05      Creatinine Trend: 1.28<--, 1.41<--, 1.16<--, 1.24<--, 1.28<--, 1.22<--    COAGS: PT/INR - ( 06 Mar 2019 04:41 )   PT: 21.4 sec;   INR: 1.83 ratio         PTT - ( 06 Mar 2019 04:41 )  PTT:129.2 sec          T(C): 36.9 (03-06-19 @ 04:43), Max: 36.9 (03-06-19 @ 04:43)  HR: 96 (03-06-19 @ 04:43) (88 - 96)  BP: 112/65 (03-06-19 @ 04:43) (95/64 - 112/65)  RR: 18 (03-06-19 @ 04:43) (17 - 19)  SpO2: 95% (03-06-19 @ 04:43) (95% - 97%)  Wt(kg): --    I&O's Summary    05 Mar 2019 07:01  -  06 Mar 2019 07:00  --------------------------------------------------------  IN: 1320 mL / OUT: 1600 mL / NET: -280 mL        JVP 8  IRR, no murmurs  CTAB  soft nt/nd  no c/c/e    echo severe LV dysfunction  TSH unremarkable  a1c unremarkable      A/P) 59 y/o male PMH BERNARDINO admitted with 1 month of CHF type symptoms, and 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 I had a JOHN-ablation planned, but his weigth (504 pounds) exceeds the 400 pound weight limit of the EP lab table. In addition his weight exceeds the cath lab weight limit. Therefore the best secondary option is JOHN/DCCV, with long term plan of CTI ablation when his weight is < 400 pounds.        -continue heparin-coumadin until INR 2-3  -  BB/Dig . rate well controlled   - tolerating low dose ACE , BP stable   - Diuresis with PO lasix  - we will arrange f/u with Dr Heard and Sara after discharge  D/W Dr Mcleod

## 2019-03-06 NOTE — PROGRESS NOTE ADULT - ASSESSMENT
57 yo M with no significant PMH but has significant FH of PE (brother  of PE, sister had PE), genetic hypercoagulation (nephew), p/w 2 wk, SOB. Continuous, no improvement, cannot lie down, and has worsening b/l leg swelling. Started back pain, since yesterday, sharp, mid/high, 8/10, continuous, some relief by taking aspirin. Denies long travel or trauma. May have sick contact, his house is close to a hospital. Had runny nose and cough x 2-3 wks. Was seen in [facility] and started on Abxs . His symptoms got worse so came here .      Problem/Plan - 1:  ·  Problem: Atrial flutter with FVR .  Plan: S/P  JOHN. < from: Transesophageal Echocardiogram w/o TTE (19 @ 14:04) >  Conclusions:  1. Left atrial enlargement.    Left atrial appendage  thrombus seen.   Decreased left atrial appendage velocities  noted.  2. Severe left ventricular enlargement.  3. Severe global left ventricular systolic dysfunction.  4. Severe right atrial enlargement.  5. Right ventricular enlargement with decreased right  ventricular systolic function.  6. Normal tricuspid valve. Moderate tricuspid  regurgitation.  7. Estimated pulmonary artery systolic pressure equals 37  mm Hg, assuming right atrial pressure equals 8 mm Hg,  consistent with borderline pulmonary pressures.  Reviewed in real time with Dr. Heard.    < end of copied text >   Rate control with BB and on AC . Doing well. Coumadin 10mg today.   Cardiology and EP following so management per them. . TTE < from: TTE with Doppler (w/Cont) (19 @ 09:41) >  Conclusions:  Technically difficult study with limited/suboptimal views.  1. Normal left ventricular internal dimensions and wall  thicknesses.  2. Endocardium not well visualized despite the use of  definity contrast; grossly severe global left ventricular  systolic dysfunction. Endocardial visualization enhanced  with intravenous injection of Ultrasonic Enhancing Agent  (Definity).  3. The right ventricle is not well visualized; grossly  decreased right ventricular systolic function.  4. Estimated right ventricular systolic pressure equals 21  mm Hg, assuming right atrial pressure equals 8 mm Hg,  consistent with normal pulmonary pressures.  *** No previous Echo exam.    < end of copied text >  and TFT noted .Rate control with BB and on AC Heparin till INR Therapeutic. . Doing well.      Problem/Plan - 2:  ·  Problem:  Viral respiratory infection with persistent cough .  Plan: Sec to hmp virus. Supportive care. Cough improving.      Problem/Plan - 3:  ·  Problem: Chest pain.  Plan: Ischemic work up per cardiology.  ACS R/O and likely sec to A flutter as well viral infection.      Problem/Plan - 4:  ·  Problem: Acute systolic CHF with cardiomyopathy. .  Plan: Cardiology and EP helping. CTA noted. < from: CT Angio Chest w/ IV Cont (19 @ 16:31) >. Will check Doppler legs to R/O DVT. BNP elevated . On Lasix.  Markedly limited study.    No pulmonary embolism in the main, right, and left pulmonary arteries.   Limited evaluation of the lobar, segmental, and subsegmental pulmonary   arteries.      Problem/Plan - 5:  ·  Problem:  INNA.  Plan: Watching BMP . On Lasix. Creatinine stable and Renal consult noted.      Problem/Plan - 6:  Problem: Family history of hypercoagulability. Plan: Will need outpt Work up .     Problem/Plan - 7:  ·  Problem:  Hematuria   Plan:  consult noted. Outpt work up and F/U      Problem/Plan - 8:  ·  Problem:  Morbid Obesity.  Plan: Will get Nutritional consult.   Disposition : DC planning pending therapeutic INR.

## 2019-03-07 VITALS — WEIGHT: 315 LBS

## 2019-03-07 LAB
ANION GAP SERPL CALC-SCNC: 13 MMOL/L — SIGNIFICANT CHANGE UP (ref 5–17)
APTT BLD: 55.5 SEC — HIGH (ref 27.5–36.3)
BUN SERPL-MCNC: 21 MG/DL — SIGNIFICANT CHANGE UP (ref 7–23)
CALCIUM SERPL-MCNC: 9.9 MG/DL — SIGNIFICANT CHANGE UP (ref 8.4–10.5)
CHLORIDE SERPL-SCNC: 95 MMOL/L — LOW (ref 96–108)
CO2 SERPL-SCNC: 24 MMOL/L — SIGNIFICANT CHANGE UP (ref 22–31)
CREAT SERPL-MCNC: 1.25 MG/DL — SIGNIFICANT CHANGE UP (ref 0.5–1.3)
GLUCOSE SERPL-MCNC: 120 MG/DL — HIGH (ref 70–99)
HCT VFR BLD CALC: 47.5 % — SIGNIFICANT CHANGE UP (ref 39–50)
HGB BLD-MCNC: 14.7 G/DL — SIGNIFICANT CHANGE UP (ref 13–17)
INR BLD: 2.3 RATIO — HIGH (ref 0.88–1.16)
MCHC RBC-ENTMCNC: 28.3 PG — SIGNIFICANT CHANGE UP (ref 27–34)
MCHC RBC-ENTMCNC: 30.9 GM/DL — LOW (ref 32–36)
MCV RBC AUTO: 91.5 FL — SIGNIFICANT CHANGE UP (ref 80–100)
PLATELET # BLD AUTO: 252 K/UL — SIGNIFICANT CHANGE UP (ref 150–400)
POTASSIUM SERPL-MCNC: 4.2 MMOL/L — SIGNIFICANT CHANGE UP (ref 3.5–5.3)
POTASSIUM SERPL-SCNC: 4.2 MMOL/L — SIGNIFICANT CHANGE UP (ref 3.5–5.3)
PROTHROM AB SERPL-ACNC: 26.9 SEC — HIGH (ref 10–13.1)
RBC # BLD: 5.19 M/UL — SIGNIFICANT CHANGE UP (ref 4.2–5.8)
RBC # FLD: 13.7 % — SIGNIFICANT CHANGE UP (ref 10.3–14.5)
SODIUM SERPL-SCNC: 132 MMOL/L — LOW (ref 135–145)
WBC # BLD: 7.71 K/UL — SIGNIFICANT CHANGE UP (ref 3.8–10.5)
WBC # FLD AUTO: 7.71 K/UL — SIGNIFICANT CHANGE UP (ref 3.8–10.5)

## 2019-03-07 RX ORDER — METOPROLOL TARTRATE 50 MG
1 TABLET ORAL
Qty: 30 | Refills: 0
Start: 2019-03-07 | End: 2019-04-05

## 2019-03-07 RX ORDER — DIGOXIN 250 MCG
1 TABLET ORAL
Qty: 30 | Refills: 0
Start: 2019-03-07 | End: 2019-04-05

## 2019-03-07 RX ORDER — LISINOPRIL 2.5 MG/1
1 TABLET ORAL
Qty: 30 | Refills: 0
Start: 2019-03-07 | End: 2019-04-05

## 2019-03-07 RX ORDER — FUROSEMIDE 40 MG
1 TABLET ORAL
Qty: 30 | Refills: 0
Start: 2019-03-07 | End: 2019-04-05

## 2019-03-07 RX ORDER — HEPARIN SODIUM 5000 [USP'U]/ML
1700 INJECTION INTRAVENOUS; SUBCUTANEOUS
Qty: 25000 | Refills: 0 | Status: DISCONTINUED | OUTPATIENT
Start: 2019-03-07 | End: 2019-03-07

## 2019-03-07 RX ORDER — FUROSEMIDE 40 MG
1 TABLET ORAL
Qty: 60 | Refills: 0
Start: 2019-03-07 | End: 2019-04-05

## 2019-03-07 RX ORDER — WARFARIN SODIUM 2.5 MG/1
1 TABLET ORAL
Qty: 30 | Refills: 0
Start: 2019-03-07 | End: 2019-04-05

## 2019-03-07 RX ADMIN — Medication 40 MILLIGRAM(S): at 05:48

## 2019-03-07 RX ADMIN — LISINOPRIL 5 MILLIGRAM(S): 2.5 TABLET ORAL at 05:48

## 2019-03-07 RX ADMIN — Medication 0.12 MILLIGRAM(S): at 05:48

## 2019-03-07 RX ADMIN — HEPARIN SODIUM 10000 UNIT(S): 5000 INJECTION INTRAVENOUS; SUBCUTANEOUS at 10:22

## 2019-03-07 RX ADMIN — HEPARIN SODIUM 1700 UNIT(S)/HR: 5000 INJECTION INTRAVENOUS; SUBCUTANEOUS at 10:18

## 2019-03-07 RX ADMIN — Medication 100 MILLIGRAM(S): at 05:48

## 2019-03-07 NOTE — PROGRESS NOTE ADULT - SUBJECTIVE AND OBJECTIVE BOX
pt seen and examined,  no chest pain or sob on exam   digoxin     Tablet 0.125 milliGRAM(s) Oral daily  furosemide    Tablet 40 milliGRAM(s) Oral two times a day  influenza   Vaccine 0.5 milliLiter(s) IntraMuscular once  lisinopril 5 milliGRAM(s) Oral daily  metoprolol succinate  milliGRAM(s) Oral daily                            14.7   7.71  )-----------( 252      ( 07 Mar 2019 08:32 )             47.5       Hemoglobin: 14.7 g/dL (03-07 @ 08:32)  Hemoglobin: 14.3 g/dL (03-06 @ 10:07)  Hemoglobin: 14.3 g/dL (03-05 @ 08:31)  Hemoglobin: 14.6 g/dL (03-04 @ 08:41)  Hemoglobin: 14.5 g/dL (03-03 @ 09:43)      03-07    132<L>  |  95<L>  |  21  ----------------------------<  120<H>  4.2   |  24  |  1.25    Ca    9.9      07 Mar 2019 06:03      Creatinine Trend: 1.25<--, 1.28<--, 1.41<--, 1.16<--, 1.24<--, 1.28<--    COAGS: PT/INR - ( 07 Mar 2019 07:41 )   PT: 26.9 sec;   INR: 2.30 ratio         PTT - ( 07 Mar 2019 07:41 )  PTT:55.5 sec          T(C): 36.7 (03-07-19 @ 04:54), Max: 36.7 (03-07-19 @ 04:54)  HR: 84 (03-07-19 @ 04:54) (63 - 98)  BP: 114/63 (03-07-19 @ 04:54) (91/65 - 136/79)  RR: 18 (03-07-19 @ 04:54) (16 - 18)  SpO2: 97% (03-07-19 @ 04:54) (97% - 97%)  Wt(kg): --    I&O's Summary    06 Mar 2019 07:01  -  07 Mar 2019 07:00  --------------------------------------------------------  IN: 1590 mL / OUT: 1200 mL / NET: 390 mL    07 Mar 2019 07:01  -  07 Mar 2019 14:44  --------------------------------------------------------  IN: 360 mL / OUT: 750 mL / NET: -390 mL    Gen: Appears well in NAD  HEENT:  (-)icterus (-)pallor  CV: N S1 S2 1/6 GEORGETTE (+)2 Pulses B/l  Resp:  Clear to ausculatation B/L, normal effort  GI: (+) BS Soft, NT, ND  Lymph:  (+))Edema, (-)obvious lymphadenopathy  Skin: Warm to touch, Normal turgor  Psych: Appropriate mood and affect      echo severe LV dysfunction  TSH unremarkable  a1c unremarkable      A/P) 59 y/o male PMH BERNARDINO admitted with 1 month of CHF type symptoms, and 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 I had a JOHN-ablation planned, but his weigth (504 pounds) exceeds the 400 pound weight limit of the EP lab table. In addition his weight exceeds the cath lab weight limit. Therefore the best secondary option is JOHN/DCCV, with long term plan of CTI ablation when his weight is < 400 pounds.        -continue heparin-coumadin until INR 2-3  -  BB/Dig . rate well controlled   - tolerating low dose ACE , BP stable   - Diuresis with PO lasix  - we will arrange f/u with Dr Heard and Sara after discharge  D/W Dr Mcleod pt seen and examined,  no chest pain or sob on exam     digoxin     Tablet 0.125 milliGRAM(s) Oral daily  furosemide    Tablet 40 milliGRAM(s) Oral two times a day  influenza   Vaccine 0.5 milliLiter(s) IntraMuscular once  lisinopril 5 milliGRAM(s) Oral daily  metoprolol succinate  milliGRAM(s) Oral daily                            14.7   7.71  )-----------( 252      ( 07 Mar 2019 08:32 )             47.5       Hemoglobin: 14.7 g/dL (03-07 @ 08:32)  Hemoglobin: 14.3 g/dL (03-06 @ 10:07)  Hemoglobin: 14.3 g/dL (03-05 @ 08:31)  Hemoglobin: 14.6 g/dL (03-04 @ 08:41)  Hemoglobin: 14.5 g/dL (03-03 @ 09:43)      03-07    132<L>  |  95<L>  |  21  ----------------------------<  120<H>  4.2   |  24  |  1.25    Ca    9.9      07 Mar 2019 06:03      Creatinine Trend: 1.25<--, 1.28<--, 1.41<--, 1.16<--, 1.24<--, 1.28<--    COAGS: PT/INR - ( 07 Mar 2019 07:41 )   PT: 26.9 sec;   INR: 2.30 ratio         PTT - ( 07 Mar 2019 07:41 )  PTT:55.5 sec          T(C): 36.7 (03-07-19 @ 04:54), Max: 36.7 (03-07-19 @ 04:54)  HR: 84 (03-07-19 @ 04:54) (63 - 98)  BP: 114/63 (03-07-19 @ 04:54) (91/65 - 136/79)  RR: 18 (03-07-19 @ 04:54) (16 - 18)  SpO2: 97% (03-07-19 @ 04:54) (97% - 97%)  Wt(kg): --    I&O's Summary    06 Mar 2019 07:01  -  07 Mar 2019 07:00  --------------------------------------------------------  IN: 1590 mL / OUT: 1200 mL / NET: 390 mL    07 Mar 2019 07:01  -  07 Mar 2019 14:44  --------------------------------------------------------  IN: 360 mL / OUT: 750 mL / NET: -390 mL    Gen: Appears well in NAD  HEENT:  (-)icterus (-)pallor  CV: N S1 S2 1/6 GEORGETTE (+)2 Pulses B/l  Resp:  Clear to ausculatation B/L, normal effort  GI: (+) BS Soft, NT, ND  Lymph:  (+))Edema, (-)obvious lymphadenopathy  Skin: Warm to touch, Normal turgor  Psych: Appropriate mood and affect      echo severe LV dysfunction  TSH unremarkable  a1c unremarkable      A/P) 57 y/o male PMH BERNARDINO admitted with 1 month of CHF type symptoms, and 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 I had a JOHN-ablation planned, but his weigth (504 pounds) exceeds the 400 pound weight limit of the EP lab table. In addition his weight exceeds the cath lab weight limit. Therefore the best secondary option is JOHN/DCCV, with long term plan of CTI ablation when his weight is < 400 pounds.        -continue heparin-coumadin until INR 2-3  -  BB/Dig . rate well controlled   - tolerating low dose ACE , BP stable   - Diuresis with PO lasix  - we will arrange f/u with Dr Heard and Sara after discharge      Giuseppe Mcleod MD, [facility]  BEEPER [phone]

## 2019-03-07 NOTE — PROGRESS NOTE ADULT - ATTENDING COMMENTS
Patient seen and examined, agree with above assessment and plan as transcribed above.    - cont coumadin INR 2-3  - D/C planning today    Giuseppe Mcleod MD, [facility]  BEEPER [phone]
Agree with above assessment and plan as outlined above.    - change to PO lasix  - Monitor crt  - f/u  eval for hematuria    Giuseppe Mcleod MD, [facility]  BEEPER [phone]
EP ATTENDING    Agree with above. Continue heparin-coumadin for lifelong anticoagulation. Change to PO lasix in next 24 hours. Continue dig and lisinopril at current doses, and increase toprol xl 100mg daily. I will arrange outpatient f/u with me and Sara after discharge.
EP ATTENDING    Agree with above. Continue heparin-coumadin for lifelong anticoagulation. Change to PO lasix in next 24 hours. Continue dig and lisinopril at current doses, and increase toprol xl 100mg daily. I will arrange outpatient f/u with me and Sara after discharge.
Patient seen and examined.  Agree with above.   -check TTE to evaluate LV function  -management of aflutter per chase Vidal MD
Patient seen and examined.  Agree with above.   continue with medical management of cardiomyopathy  ac for goal INR 2-3    Puneet Vidal MD
EP ATTENDING    Agree with above. Awaiting echo and TSH to decide between DCCV vs ablation. Continue heparin for now.
EP ATTENDING    Agree with above. NPO after midnight for JOHN/DCCV in OR tomorrow under general anesthesia. Continue heparin-coumadin to get INR 2-3. Continue metoprolol, lisinopril and lasix for severe LV dysfunction. Ischemia evaluation for severe LV dysfunction as per cardiology.
Patient seen and examined.  Agree with above.   -medical management of cardiomyopathy recommended at this time  -pt. body habitus and weight prevent further ischemic testing at this time  -management of afl per chase Vidal MD
agree with above.   check tte    Puneet Vidal MD
Agree with above assessment and plan as outlined above.    - Keep net negative  - Would up titrate diuretics if BP allows    Giuseppe Mcleod MD, [facility]  BEEPER [phone]
EP ATTENDING    Agree with above. INR now therapeutic    Plan  -continue dig 0.125 and toprol xl 100 and lisinopril 10mg daily  -change lasix to 80mg po daily  -eventual cath and CTI ablation when his weight is < 400 pounds  -may eventually require a primary prevention ICD if his LVEF doesn't improve after cath and ablation and at least 3 months of medical therapy  -f/u with [facility] within 2 weeks of discharge for establishment of cardiac care and INR testing   -expect d/c home today

## 2019-03-07 NOTE — PROGRESS NOTE ADULT - ASSESSMENT
The patient is a 58-year-old gentleman with the past medical history of morbid obesity, glucose intolerance/diabetes, presents with a new onset congestive heart failure and atrial flutter.  The patient did have hematuria both gross and microscopic.  He also had some renal azotemia that did not quite make the definition of the acute kidney injury.  He currently is volume overloaded.  Diabetes/Glucose intolerance  Hypervolemic Hyponatremia  Morbid obesity    1.	Cardiology.  Lasix 40 twice a day to twice a day.    2.	Renal.  No reason to stop lisinopril.  Outpatient workup with Dr. Derik Cormier including a renal sonogram.  This workup will need not be done in the hospital.  Restrict fluids without calories  3.	Endo.  Weight loss would help with his glucose intolerance in his overall health.  This was discussed with the patient and he is interested in losing weight.        He will need to lose weight before he can entertain gastric bypass.  Further workup pending above.

## 2019-03-07 NOTE — PROGRESS NOTE ADULT - ASSESSMENT
59 yo M with no significant PMH but has significant FH of PE (brother  of PE, sister had PE), genetic hypercoagulation (nephew), p/w 2 wk, SOB. Continuous, no improvement, cannot lie down, and has worsening b/l leg swelling. Started back pain, since yesterday, sharp, mid/high, 8/10, continuous, some relief by taking aspirin. Denies long travel or trauma. May have sick contact, his house is close to a hospital. Had runny nose and cough x 2-3 wks. Was seen in [facility] and started on Abxs . His symptoms got worse so came here .      Problem/Plan - 1:  ·  Problem: Atrial flutter with FVR .  Plan: S/P  JOHN. < from: Transesophageal Echocardiogram w/o TTE (19 @ 14:04) >  Conclusions:  1. Left atrial enlargement.    Left atrial appendage  thrombus seen.   Decreased left atrial appendage velocities  noted.  2. Severe left ventricular enlargement.  3. Severe global left ventricular systolic dysfunction.  4. Severe right atrial enlargement.  5. Right ventricular enlargement with decreased right  ventricular systolic function.  6. Normal tricuspid valve. Moderate tricuspid  regurgitation.  7. Estimated pulmonary artery systolic pressure equals 37  mm Hg, assuming right atrial pressure equals 8 mm Hg,  consistent with borderline pulmonary pressures.  Reviewed in real time with Dr. Heard.    < end of copied text >   Rate control with BB and on AC . Doing well. Coumadin 10mg today.   Cardiology and EP following so management per them. . TTE < from: TTE with Doppler (w/Cont) (19 @ 09:41) >  Conclusions:  Technically difficult study with limited/suboptimal views.  1. Normal left ventricular internal dimensions and wall  thicknesses.  2. Endocardium not well visualized despite the use of  definity contrast; grossly severe global left ventricular  systolic dysfunction. Endocardial visualization enhanced  with intravenous injection of Ultrasonic Enhancing Agent  (Definity).  3. The right ventricle is not well visualized; grossly  decreased right ventricular systolic function.  4. Estimated right ventricular systolic pressure equals 21  mm Hg, assuming right atrial pressure equals 8 mm Hg,  consistent with normal pulmonary pressures.  *** No previous Echo exam.    < end of copied text >  and TFT noted .Rate control with BB and on AC Heparin till INR Therapeutic. . Doing well.      Problem/Plan - 2:  ·  Problem:  Viral respiratory infection with persistent cough .  Plan: Sec to hmp virus. Supportive care. Cough improving.      Problem/Plan - 3:  ·  Problem: Chest pain.  Plan: Ischemic work up per cardiology.  ACS R/O and likely sec to A flutter as well viral infection.      Problem/Plan - 4:  ·  Problem: Acute systolic CHF with cardiomyopathy. .  Plan: Cardiology and EP helping. CTA noted. < from: CT Angio Chest w/ IV Cont (19 @ 16:31) >. Will check Doppler legs to R/O DVT. BNP elevated . On Lasix.  Markedly limited study.    No pulmonary embolism in the main, right, and left pulmonary arteries.   Limited evaluation of the lobar, segmental, and subsegmental pulmonary   arteries.      Problem/Plan - 5:  ·  Problem:  INNA.  Plan: Watching BMP . On Lasix. Creatinine stable and Renal consult noted.      Problem/Plan - 6:  Problem: Family history of hypercoagulability. Plan: Will need outpt Work up .     Problem/Plan - 7:  ·  Problem:  Hematuria   Plan:  consult noted. Outpt work up and F/U      Problem/Plan - 8:  ·  Problem:  Morbid Obesity.  Plan:  Nutritional consulted.     Disposition : DC planning as therapeutic INR to f/u with Cardiologist as well PCP.

## 2019-03-07 NOTE — PROGRESS NOTE ADULT - SUBJECTIVE AND OBJECTIVE BOX
NEPHROLOGY-[facility] [phone]        Patient seen and examined         MEDICATIONS  (STANDING):  digoxin     Tablet 0.125 milliGRAM(s) Oral daily  furosemide    Tablet 40 milliGRAM(s) Oral two times a day  heparin  Infusion. 1700 Unit(s)/Hr (17 mL/Hr) IV Continuous <Continuous>  influenza   Vaccine 0.5 milliLiter(s) IntraMuscular once  lisinopril 5 milliGRAM(s) Oral daily  metoprolol succinate  milliGRAM(s) Oral daily      VITAL:  T(C): , Max: 36.7 (19 @ 04:54)  T(F): , Max: 98 (19 @ 04:54)  HR: 84 (19 @ 04:54)  BP: 114/63 (19 @ 04:54)  BP(mean): --  RR: 18 (19 @ 04:54)  SpO2: 97% (19 @ 04:54)  Wt(kg): --    I and O's:     @ 07:01  -   @ 07:00  --------------------------------------------------------  IN: 1590 mL / OUT: 1200 mL / NET: 390 mL        Weight (kg): 220 ( @ 09:50)    PHYSICAL EXAM:    Constitutional: NAD  Neck:  No JVD  Respiratory: CTAB/L  Cardiovascular: S1 and S2  Gastrointestinal: BS+, soft, NT/ND  Extremities: No peripheral edema  Neurological: A/O x 3, no focal deficits  Psychiatric: Normal mood, normal affect  : No Craft  Skin: No rashes  Access: Not applicable    LABS:                        14.7   7.71  )-----------( 252      ( 07 Mar 2019 08:32 )             47.5         132<L>  |  95<L>  |  21  ----------------------------<  120<H>  4.2   |  24  |  1.25    Ca    9.9      07 Mar 2019 06:03            Urine Studies:  Urinalysis Basic - ( 05 Mar 2019 22:38 )    Color: Yellow / Appearance: Clear / S.024 / pH: x  Gluc: x / Ketone: Negative  / Bili: Negative / Urobili: <2 mg/dL   Blood: x / Protein: Trace / Nitrite: Negative   Leuk Esterase: Large / RBC: 11 /HPF / WBC 28 /HPF   Sq Epi: x / Non Sq Epi: 2 /HPF / Bacteria: Negative            RADIOLOGY & ADDITIONAL STUDIES:

## 2019-03-07 NOTE — PROGRESS NOTE ADULT - SUBJECTIVE AND OBJECTIVE BOX
INTERVAL HPI/OVERNIGHT EVENTS: I am feel fine and need papers to be filled for my job.   Vital Signs Last 24 Hrs  T(C): 36.7 (07 Mar 2019 04:54), Max: 36.7 (07 Mar 2019 04:54)  T(F): 98 (07 Mar 2019 04:54), Max: 98 (07 Mar 2019 04:54)  HR: 84 (07 Mar 2019 04:54) (63 - 98)  BP: 114/63 (07 Mar 2019 04:54) (91/65 - 136/79)  BP(mean): --  RR: 18 (07 Mar 2019 04:54) (16 - 18)  SpO2: 97% (07 Mar 2019 04:54) (95% - 97%)  I&O's Summary    06 Mar 2019 07:01  -  07 Mar 2019 07:00  --------------------------------------------------------  IN: 1590 mL / OUT: 1200 mL / NET: 390 mL      MEDICATIONS  (STANDING):  digoxin     Tablet 0.125 milliGRAM(s) Oral daily  furosemide    Tablet 40 milliGRAM(s) Oral two times a day  influenza   Vaccine 0.5 milliLiter(s) IntraMuscular once  lisinopril 5 milliGRAM(s) Oral daily  metoprolol succinate  milliGRAM(s) Oral daily    MEDICATIONS  (PRN):    LABS:                        14.7   7.71  )-----------( 252      ( 07 Mar 2019 08:32 )             47.5     03-07    132<L>  |  95<L>  |  21  ----------------------------<  120<H>  4.2   |  24  |  1.25    Ca    9.9      07 Mar 2019 06:03      PT/INR - ( 07 Mar 2019 07:41 )   PT: 26.9 sec;   INR: 2.30 ratio         PTT - ( 07 Mar 2019 07:41 )  PTT:55.5 sec  Urinalysis Basic - ( 05 Mar 2019 22:38 )    Color: Yellow / Appearance: Clear / S.024 / pH: x  Gluc: x / Ketone: Negative  / Bili: Negative / Urobili: <2 mg/dL   Blood: x / Protein: Trace / Nitrite: Negative   Leuk Esterase: Large / RBC: 11 /HPF / WBC 28 /HPF   Sq Epi: x / Non Sq Epi: 2 /HPF / Bacteria: Negative      CAPILLARY BLOOD GLUCOSE            Urinalysis Basic - ( 05 Mar 2019 22:38 )    Color: Yellow / Appearance: Clear / S.024 / pH: x  Gluc: x / Ketone: Negative  / Bili: Negative / Urobili: <2 mg/dL   Blood: x / Protein: Trace / Nitrite: Negative   Leuk Esterase: Large / RBC: 11 /HPF / WBC 28 /HPF   Sq Epi: x / Non Sq Epi: 2 /HPF / Bacteria: Negative      REVIEW OF SYSTEMS:  CONSTITUTIONAL: No fever, weight loss, or fatigue  EYES: No eye pain, visual disturbances, or discharge  ENMT:  No difficulty hearing, tinnitus, vertigo; No sinus or throat pain  NECK: No pain or stiffness  RESPIRATORY: No cough, wheezing, chills or hemoptysis; No shortness of breath  CARDIOVASCULAR: No chest pain, palpitations, dizziness, or leg swelling  GASTROINTESTINAL: No abdominal or epigastric pain. No nausea, vomiting, or hematemesis; No diarrhea or constipation. No melena or hematochezia.  GENITOURINARY: No dysuria, frequency, hematuria, or incontinence  NEUROLOGICAL: No headaches, memory loss, loss of strength, numbness, or tremors    Consultant(s) Notes Reviewed:  [x ] YES  [ ] NO    PHYSICAL EXAM:  GENERAL: NAD, Morbidly Obese ,not in any distress ,  HEAD:  Atraumatic, Normocephalic  EYES: EOMI, PERRLA, conjunctiva and sclera clear  ENMT: No tonsillar erythema, exudates, or enlargement; Moist mucous membranes, Good dentition, No lesions  NECK: Supple, No JVD, Normal thyroid  NERVOUS SYSTEM:  Alert & Oriented X3, No focal deficit   CHEST/LUNG: Good air entry bilateral with no  rales, rhonchi, wheezing, or rubs  HEART: irregular rate and rhythm; No murmurs, rubs, or gallops  ABDOMEN: Soft, Nontender, Nondistended; Bowel sounds present  EXTREMITIES:  2+ Peripheral Pulses, No clubbing, cyanosis, but less  edema    Care Discussed with Consultants/Other Providers [ x] YES  [ ] NO

## 2019-03-07 NOTE — PROGRESS NOTE ADULT - SUBJECTIVE AND OBJECTIVE BOX
pt seen and examined,  no events overnight   digoxin     Tablet 0.125 milliGRAM(s) Oral daily  furosemide    Tablet 40 milliGRAM(s) Oral two times a day  heparin  Infusion. 1700 Unit(s)/Hr IV Continuous <Continuous>  heparin  Injectable 56324 Unit(s) IV Push every 6 hours PRN  heparin  Injectable 5000 Unit(s) IV Push every 6 hours PRN  influenza   Vaccine 0.5 milliLiter(s) IntraMuscular once  lisinopril 5 milliGRAM(s) Oral daily  metoprolol succinate  milliGRAM(s) Oral daily                            14.7   7.71  )-----------( 252      ( 07 Mar 2019 08:32 )             47.5       Hemoglobin: 14.7 g/dL (03-07 @ 08:32)  Hemoglobin: 14.3 g/dL (03-06 @ 10:07)  Hemoglobin: 14.3 g/dL (03-05 @ 08:31)  Hemoglobin: 14.6 g/dL (03-04 @ 08:41)  Hemoglobin: 14.5 g/dL (03-03 @ 09:43)      03-07    132<L>  |  95<L>  |  21  ----------------------------<  120<H>  4.2   |  24  |  1.25    Ca    9.9      07 Mar 2019 06:03      Creatinine Trend: 1.25<--, 1.28<--, 1.41<--, 1.16<--, 1.24<--, 1.28<--    COAGS: PT/INR - ( 07 Mar 2019 07:41 )   PT: 26.9 sec;   INR: 2.30 ratio         PTT - ( 07 Mar 2019 07:41 )  PTT:55.5 sec          T(C): 36.7 (03-07-19 @ 04:54), Max: 36.7 (03-07-19 @ 04:54)  HR: 84 (03-07-19 @ 04:54) (63 - 98)  BP: 114/63 (03-07-19 @ 04:54) (91/65 - 136/79)  RR: 18 (03-07-19 @ 04:54) (16 - 18)  SpO2: 97% (03-07-19 @ 04:54) (95% - 97%)  Wt(kg): --    I&O's Summary    06 Mar 2019 07:01  -  07 Mar 2019 07:00  --------------------------------------------------------  IN: 1590 mL / OUT: 1200 mL / NET: 390 mL      JVP 8  IRR, no murmurs  CTAB  soft nt/nd  no c/c/e    echo severe LV dysfunction  TSH unremarkable  a1c unremarkable      A/P) 59 y/o male PMH BERNARDINO admitted with 1 month of CHF type symptoms, and 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 I had a JOHN-ablation planned, but his weigth (504 pounds) exceeds the 400 pound weight limit of the EP lab table. In addition his weight exceeds the cath lab weight limit. Therefore the best secondary option is JOHN/DCCV, with long term plan of CTI ablation when his weight is < 400 pounds.        - A/C with coumadin, INR 2-3   -  BB/Dig . rate well controlled   - tolerating low dose ACE , BP stable   - doing well on lasix po   - we will arrange f/u with Dr Heard and Sara after discharge  D/W Dr Mcleod pt seen and examined,  no events overnight   digoxin     Tablet 0.125 milliGRAM(s) Oral daily  furosemide    Tablet 40 milliGRAM(s) Oral two times a day  heparin  Infusion. 1700 Unit(s)/Hr IV Continuous <Continuous>  heparin  Injectable 41635 Unit(s) IV Push every 6 hours PRN  heparin  Injectable 5000 Unit(s) IV Push every 6 hours PRN  influenza   Vaccine 0.5 milliLiter(s) IntraMuscular once  lisinopril 5 milliGRAM(s) Oral daily  metoprolol succinate  milliGRAM(s) Oral daily                            14.7   7.71  )-----------( 252      ( 07 Mar 2019 08:32 )             47.5       Hemoglobin: 14.7 g/dL (03-07 @ 08:32)  Hemoglobin: 14.3 g/dL (03-06 @ 10:07)  Hemoglobin: 14.3 g/dL (03-05 @ 08:31)  Hemoglobin: 14.6 g/dL (03-04 @ 08:41)  Hemoglobin: 14.5 g/dL (03-03 @ 09:43)      03-07    132<L>  |  95<L>  |  21  ----------------------------<  120<H>  4.2   |  24  |  1.25    Ca    9.9      07 Mar 2019 06:03      Creatinine Trend: 1.25<--, 1.28<--, 1.41<--, 1.16<--, 1.24<--, 1.28<--    COAGS: PT/INR - ( 07 Mar 2019 07:41 )   PT: 26.9 sec;   INR: 2.30 ratio         PTT - ( 07 Mar 2019 07:41 )  PTT:55.5 sec          T(C): 36.7 (03-07-19 @ 04:54), Max: 36.7 (03-07-19 @ 04:54)  HR: 84 (03-07-19 @ 04:54) (63 - 98)  BP: 114/63 (03-07-19 @ 04:54) (91/65 - 136/79)  RR: 18 (03-07-19 @ 04:54) (16 - 18)  SpO2: 97% (03-07-19 @ 04:54) (95% - 97%)  Wt(kg): --    I&O's Summary    06 Mar 2019 07:01  -  07 Mar 2019 07:00  --------------------------------------------------------  IN: 1590 mL / OUT: 1200 mL / NET: 390 mL      JVP 8  IRR, no murmurs  CTAB  soft nt/nd  no c/c/e    echo severe LV dysfunction  TSH unremarkable  a1c unremarkable      A/P) 59 y/o male PMH BERNARDINO admitted with 1 month of CHF type symptoms, and 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 I had a JOHN-ablation planned, but his weigth (504 pounds) exceeds the 400 pound weight limit of the EP lab table. In addition his weight exceeds the cath lab weight limit. Therefore the best secondary option is JOHN/DCCV, with long term plan of CTI ablation when his weight is < 400 pounds.        - A/C with coumadin, INR 2-3   -  BB/Dig . rate well controlled   - tolerating low dose ACE , BP stable   - doing well on lasix po

## 2019-07-23 NOTE — PROGRESS NOTE ADULT - REASON FOR ADMISSION
SOB and chest pain
social/Yes

## 2022-02-16 NOTE — PROGRESS NOTE ADULT - SUBJECTIVE AND OBJECTIVE BOX
Patient pending psychiatric transfer. Medically cleared in the ED. No complaints at this time. INTERVAL HPI/OVERNIGHT EVENTS: I feel fine.   Vital Signs Last 24 Hrs  T(C): 36.3 (04 Mar 2019 05:17), Max: 36.6 (03 Mar 2019 20:25)  T(F): 97.4 (04 Mar 2019 05:17), Max: 97.9 (03 Mar 2019 20:25)  HR: 106 (04 Mar 2019 05:17) (97 - 106)  BP: 117/77 (04 Mar 2019 05:17) (107/60 - 131/77)  BP(mean): --  RR: 18 (04 Mar 2019 05:17) (18 - 19)  SpO2: 96% (04 Mar 2019 05:17) (94% - 96%)  I&O's Summary    03 Mar 2019 07:01  -  04 Mar 2019 07:00  --------------------------------------------------------  IN: 1704 mL / OUT: 2400 mL / NET: -696 mL    04 Mar 2019 07:01  -  04 Mar 2019 13:47  --------------------------------------------------------  IN: 480 mL / OUT: 700 mL / NET: -220 mL      MEDICATIONS  (STANDING):  digoxin     Tablet 0.125 milliGRAM(s) Oral daily  furosemide   Injectable 40 milliGRAM(s) IV Push two times a day  heparin  Infusion. 2100 Unit(s)/Hr (21 mL/Hr) IV Continuous <Continuous>  influenza   Vaccine 0.5 milliLiter(s) IntraMuscular once  lisinopril 5 milliGRAM(s) Oral daily  metoprolol succinate  milliGRAM(s) Oral daily    MEDICATIONS  (PRN):  heparin  Injectable 64833 Unit(s) IV Push every 6 hours PRN For aPTT less than 40  heparin  Injectable 5000 Unit(s) IV Push every 6 hours PRN For aPTT between 40 - 57    LABS:                        14.6   7.95  )-----------( 275      ( 04 Mar 2019 08:41 )             47.0     03-04    138  |  97  |  15  ----------------------------<  124<H>  4.2   |  24  |  1.16    Ca    9.5      04 Mar 2019 05:51      PT/INR - ( 04 Mar 2019 09:27 )   PT: 19.4 sec;   INR: 1.67 ratio         PTT - ( 04 Mar 2019 09:27 )  PTT:74.9 sec    CAPILLARY BLOOD GLUCOSE              REVIEW OF SYSTEMS:  CONSTITUTIONAL: No fever, weight loss, or fatigue  EYES: No eye pain, visual disturbances, or discharge  ENMT:  No difficulty hearing, tinnitus, vertigo; No sinus or throat pain  RESPIRATORY: No cough, wheezing, chills or hemoptysis; No shortness of breath  CARDIOVASCULAR: No chest pain, palpitations, dizziness, or leg swelling  GASTROINTESTINAL: No abdominal or epigastric pain. No nausea, vomiting, or hematemesis; No diarrhea or constipation. No melena or hematochezia.  GENITOURINARY: No dysuria, frequency, hematuria, or incontinence  NEUROLOGICAL: No headaches, memory loss, loss of strength, numbness, or tremors    Consultant(s) Notes Reviewed:  [x ] YES  [ ] NO    PHYSICAL EXAM:  GENERAL: NAD, Morbidly Obese ,not in any distress ,  HEAD:  Atraumatic, Normocephalic  EYES: EOMI, PERRLA, conjunctiva and sclera clear  ENMT: No tonsillar erythema, exudates, or enlargement; Moist mucous membranes, Good dentition, No lesions  NECK: Supple, No JVD, Normal thyroid  NERVOUS SYSTEM:  Alert & Oriented X3, No focal deficit   CHEST/LUNG: Good air entry bilateral with no  rales, rhonchi, wheezing, or rubs  HEART: Irregular rate and rhythm; No murmurs, rubs, or gallops  ABDOMEN: Soft, Nontender, Nondistended; Bowel sounds present  EXTREMITIES:  2+ Peripheral Pulses, No clubbing, cyanosis, or edema  SKIN: No rashes or lesions    Care Discussed with Consultants/Other Providers [ x] YES  [ ] NO

## 2022-09-06 NOTE — PROGRESS NOTE ADULT - ASSESSMENT
57 yo M with no significant PMH but has significant FH of PE (brother  of PE, sister had PE), genetic hypercoagulation (nephew), p/w 2 wk, SOB. Continuous, no improvement, cannot lie down, and has worsening b/l leg swelling. Started back pain, since yesterday, sharp, mid/high, 8/10, continuous, some relief by taking aspirin. Denies long travel or trauma. May have sick contact, his house is close to a hospital. Had runny nose and cough x 2-3 wks. Was seen in [facility] and started on Abxs . His symptoms got worse so came here .      Problem/Plan - 1:  ·  Problem: Atrial flutter with FVR .  Plan: Rate control with BB and on AC . Doing well.   Cardiology and EP following so amanegemnt per them. . TTE and TFT pending.      Problem/Plan - 2:  ·  Problem:  Viral respiratory infection with persistent cough .  Plan: hmp virus. Supportive care. Cough improving.      Problem/Plan - 3:  ·  Problem: Chest pain.  Plan:  ACS R/O and likely sec to A flutter as well viral infection. TTE pending.      Problem/Plan - 4:  ·  Problem: Dyspnea.  Plan: Sec to Atrial flutter as well viral infection CTA noted. < from: CT Angio Chest w/ IV Cont (19 @ 16:31) >. Will check Doppler legs to R/O DVT. BNP elevated . On Lasix.  Markedly limited study.    No pulmonary embolism in the main, right, and left pulmonary arteries.   Limited evaluation of the lobar, segmental, and subsegmental pulmonary   arteries.      Problem/Plan - 5:  ·  Problem:  INNA.  Plan: Watching BMP . On Lasix.      Problem/Plan - 6:  Problem: Family history of hypercoagulability. Plan: Will need outpt Work up .     Problem/Plan - 7:  ·  Problem:  Obesity.  Plan: Weight loss . Expiration Date (Month Year): 07/2024

## 2023-02-15 ENCOUNTER — EMERGENCY (EMERGENCY)
Facility: HOSPITAL | Age: 63
LOS: 1 days | Discharge: HOME | End: 2023-02-15
Attending: EMERGENCY MEDICINE
Payer: COMMERCIAL

## 2023-02-15 VITALS
SYSTOLIC BLOOD PRESSURE: 122 MMHG | DIASTOLIC BLOOD PRESSURE: 66 MMHG | HEART RATE: 108 BPM | RESPIRATION RATE: 22 BRPM | WEIGHT: 315 LBS | OXYGEN SATURATION: 96 % | TEMPERATURE: 98 F | HEIGHT: 74 IN

## 2023-02-15 VITALS
SYSTOLIC BLOOD PRESSURE: 105 MMHG | HEART RATE: 86 BPM | OXYGEN SATURATION: 96 % | DIASTOLIC BLOOD PRESSURE: 55 MMHG | RESPIRATION RATE: 20 BRPM

## 2023-02-15 LAB
ALBUMIN SERPL ELPH-MCNC: 4.2 G/DL — SIGNIFICANT CHANGE UP (ref 3.3–5)
ALP SERPL-CCNC: 71 U/L — SIGNIFICANT CHANGE UP (ref 40–120)
ALT FLD-CCNC: 34 U/L — SIGNIFICANT CHANGE UP (ref 10–45)
ANION GAP SERPL CALC-SCNC: 15 MMOL/L — SIGNIFICANT CHANGE UP (ref 5–17)
APPEARANCE UR: ABNORMAL
AST SERPL-CCNC: 31 U/L — SIGNIFICANT CHANGE UP (ref 10–40)
BACTERIA # UR AUTO: ABNORMAL
BASOPHILS # BLD AUTO: 0.06 K/UL — SIGNIFICANT CHANGE UP (ref 0–0.2)
BASOPHILS NFR BLD AUTO: 0.5 % — SIGNIFICANT CHANGE UP (ref 0–2)
BILIRUB SERPL-MCNC: 0.4 MG/DL — SIGNIFICANT CHANGE UP (ref 0.2–1.2)
BILIRUB UR-MCNC: NEGATIVE — SIGNIFICANT CHANGE UP
BUN SERPL-MCNC: 21 MG/DL — SIGNIFICANT CHANGE UP (ref 7–23)
CALCIUM SERPL-MCNC: 9.8 MG/DL — SIGNIFICANT CHANGE UP (ref 8.4–10.5)
CHLORIDE SERPL-SCNC: 102 MMOL/L — SIGNIFICANT CHANGE UP (ref 96–108)
CO2 SERPL-SCNC: 20 MMOL/L — LOW (ref 22–31)
COLOR SPEC: YELLOW — SIGNIFICANT CHANGE UP
CREAT SERPL-MCNC: 1.25 MG/DL — SIGNIFICANT CHANGE UP (ref 0.5–1.3)
DIFF PNL FLD: ABNORMAL
EGFR: 65 ML/MIN/1.73M2 — SIGNIFICANT CHANGE UP
EOSINOPHIL # BLD AUTO: 0.1 K/UL — SIGNIFICANT CHANGE UP (ref 0–0.5)
EOSINOPHIL NFR BLD AUTO: 0.8 % — SIGNIFICANT CHANGE UP (ref 0–6)
EPI CELLS # UR: 1 /HPF — SIGNIFICANT CHANGE UP
FLUAV AG NPH QL: SIGNIFICANT CHANGE UP
FLUBV AG NPH QL: SIGNIFICANT CHANGE UP
GLUCOSE SERPL-MCNC: 155 MG/DL — HIGH (ref 70–99)
GLUCOSE UR QL: NEGATIVE — SIGNIFICANT CHANGE UP
HCT VFR BLD CALC: 43.5 % — SIGNIFICANT CHANGE UP (ref 39–50)
HGB BLD-MCNC: 13.9 G/DL — SIGNIFICANT CHANGE UP (ref 13–17)
HYALINE CASTS # UR AUTO: 1 /LPF — SIGNIFICANT CHANGE UP (ref 0–2)
IMM GRANULOCYTES NFR BLD AUTO: 0.6 % — SIGNIFICANT CHANGE UP (ref 0–0.9)
KETONES UR-MCNC: SIGNIFICANT CHANGE UP
LEUKOCYTE ESTERASE UR-ACNC: ABNORMAL
LYMPHOCYTES # BLD AUTO: 1.49 K/UL — SIGNIFICANT CHANGE UP (ref 1–3.3)
LYMPHOCYTES # BLD AUTO: 11.9 % — LOW (ref 13–44)
MCHC RBC-ENTMCNC: 29.5 PG — SIGNIFICANT CHANGE UP (ref 27–34)
MCHC RBC-ENTMCNC: 32 GM/DL — SIGNIFICANT CHANGE UP (ref 32–36)
MCV RBC AUTO: 92.4 FL — SIGNIFICANT CHANGE UP (ref 80–100)
MONOCYTES # BLD AUTO: 0.99 K/UL — HIGH (ref 0–0.9)
MONOCYTES NFR BLD AUTO: 7.9 % — SIGNIFICANT CHANGE UP (ref 2–14)
NEUTROPHILS # BLD AUTO: 9.83 K/UL — HIGH (ref 1.8–7.4)
NEUTROPHILS NFR BLD AUTO: 78.3 % — HIGH (ref 43–77)
NITRITE UR-MCNC: POSITIVE
NRBC # BLD: 0 /100 WBCS — SIGNIFICANT CHANGE UP (ref 0–0)
PH UR: 6.5 — SIGNIFICANT CHANGE UP (ref 5–8)
PLATELET # BLD AUTO: 221 K/UL — SIGNIFICANT CHANGE UP (ref 150–400)
POTASSIUM SERPL-MCNC: 4 MMOL/L — SIGNIFICANT CHANGE UP (ref 3.5–5.3)
POTASSIUM SERPL-SCNC: 4 MMOL/L — SIGNIFICANT CHANGE UP (ref 3.5–5.3)
PROT SERPL-MCNC: 7.6 G/DL — SIGNIFICANT CHANGE UP (ref 6–8.3)
PROT UR-MCNC: ABNORMAL
RBC # BLD: 4.71 M/UL — SIGNIFICANT CHANGE UP (ref 4.2–5.8)
RBC # FLD: 14.2 % — SIGNIFICANT CHANGE UP (ref 10.3–14.5)
RBC CASTS # UR COMP ASSIST: 45 /HPF — HIGH (ref 0–4)
RSV RNA NPH QL NAA+NON-PROBE: SIGNIFICANT CHANGE UP
SARS-COV-2 RNA SPEC QL NAA+PROBE: SIGNIFICANT CHANGE UP
SODIUM SERPL-SCNC: 137 MMOL/L — SIGNIFICANT CHANGE UP (ref 135–145)
SP GR SPEC: 1.03 — HIGH (ref 1.01–1.02)
UROBILINOGEN FLD QL: ABNORMAL
WBC # BLD: 12.54 K/UL — HIGH (ref 3.8–10.5)
WBC # FLD AUTO: 12.54 K/UL — HIGH (ref 3.8–10.5)
WBC UR QL: 92 /HPF — HIGH (ref 0–5)

## 2023-02-15 PROCEDURE — 80053 COMPREHEN METABOLIC PANEL: CPT

## 2023-02-15 PROCEDURE — 87086 URINE CULTURE/COLONY COUNT: CPT

## 2023-02-15 PROCEDURE — 85025 COMPLETE CBC W/AUTO DIFF WBC: CPT

## 2023-02-15 PROCEDURE — 99285 EMERGENCY DEPT VISIT HI MDM: CPT

## 2023-02-15 PROCEDURE — 96375 TX/PRO/DX INJ NEW DRUG ADDON: CPT | Mod: XU

## 2023-02-15 PROCEDURE — 51703 INSERT BLADDER CATH COMPLEX: CPT

## 2023-02-15 PROCEDURE — 99284 EMERGENCY DEPT VISIT MOD MDM: CPT | Mod: 25

## 2023-02-15 PROCEDURE — 96374 THER/PROPH/DIAG INJ IV PUSH: CPT | Mod: XU

## 2023-02-15 PROCEDURE — 81001 URINALYSIS AUTO W/SCOPE: CPT

## 2023-02-15 PROCEDURE — 87637 SARSCOV2&INF A&B&RSV AMP PRB: CPT

## 2023-02-15 PROCEDURE — 51702 INSERT TEMP BLADDER CATH: CPT

## 2023-02-15 RX ORDER — FINASTERIDE 5 MG/1
1 TABLET, FILM COATED ORAL
Qty: 14 | Refills: 0
Start: 2023-02-15 | End: 2023-02-28

## 2023-02-15 RX ORDER — TAMSULOSIN HYDROCHLORIDE 0.4 MG/1
1 CAPSULE ORAL
Qty: 14 | Refills: 0
Start: 2023-02-15 | End: 2023-02-28

## 2023-02-15 RX ORDER — CEFDINIR 250 MG/5ML
1 POWDER, FOR SUSPENSION ORAL
Qty: 14 | Refills: 0
Start: 2023-02-15 | End: 2023-02-21

## 2023-02-15 RX ORDER — MORPHINE SULFATE 50 MG/1
4 CAPSULE, EXTENDED RELEASE ORAL ONCE
Refills: 0 | Status: DISCONTINUED | OUTPATIENT
Start: 2023-02-15 | End: 2023-02-15

## 2023-02-15 RX ORDER — CEFTRIAXONE 500 MG/1
1000 INJECTION, POWDER, FOR SOLUTION INTRAMUSCULAR; INTRAVENOUS ONCE
Refills: 0 | Status: COMPLETED | OUTPATIENT
Start: 2023-02-15 | End: 2023-02-15

## 2023-02-15 RX ADMIN — CEFTRIAXONE 100 MILLIGRAM(S): 500 INJECTION, POWDER, FOR SOLUTION INTRAMUSCULAR; INTRAVENOUS at 17:49

## 2023-02-15 RX ADMIN — MORPHINE SULFATE 4 MILLIGRAM(S): 50 CAPSULE, EXTENDED RELEASE ORAL at 14:42

## 2023-02-15 RX ADMIN — MORPHINE SULFATE 4 MILLIGRAM(S): 50 CAPSULE, EXTENDED RELEASE ORAL at 15:11

## 2023-02-15 NOTE — ED ADULT NURSE NOTE - NSIMPLEMENTINTERV_GEN_ALL_ED
Implemented All Fall Risk Interventions:  [place] to call system. Call bell, personal items and telephone within reach. Instruct patient to call for assistance. Room bathroom lighting operational. Non-slip footwear when patient is off stretcher. Physically safe environment: no spills, clutter or unnecessary equipment. Stretcher in lowest position, wheels locked, appropriate side rails in place. Provide visual cue, wrist band, yellow gown, etc. Monitor gait and stability. Monitor for mental status changes and reorient to person, place, and time. Review medications for side effects contributing to fall risk. Reinforce activity limits and safety measures with patient and family.

## 2023-02-15 NOTE — ED PROVIDER NOTE - OBJECTIVE STATEMENT
63 y/o male PMHx atrial flutter c/b Left atrial appendage thrombus, Pulm embolus, has genetic hypercoagulation, cardiomyopathy (history obtained from previous [facility] documentation) and noncompliant on all medications (does not take any), has not seen physician in 3 years now presenting to the ED with inability to void since 8pm last night. Patient reported he has the sensation to void but was unable and has dysuria with associated penile swelling and pain. Patient denied nausea, vomiting, fever, CP, SOB, abdominal pain, hematuria, back pain

## 2023-02-15 NOTE — ED PROVIDER NOTE - PATIENT PORTAL LINK FT
You can access the FollowMyHealth Patient Portal offered by [facility] by registering at the following website: [email or web address]. By joining [facility]’s FollowMyHealth portal, you will also be able to view your health information using other applications (apps) compatible with our system.

## 2023-02-15 NOTE — ED PROVIDER NOTE - ATTENDING APP SHARED VISIT CONTRIBUTION OF CARE
Attending MD Phillips:   I personally have seen and examined this patient.  Physician assistant note reviewed and agree on plan of care and except where noted.  See below for details.     Seen in [place] [facility], accompanied by niece    62M with (from EMR, patient requested EMR be checked) PMH/PSH including AFlutter, L atrial appendage thrombus, PE, genetic hypercoagulability, not taking any meds, no physician eval in 3 years presents to the ED with inability to void since 8 pm yesterday.  Reports initially just felt like he was having difficulty urinating and mild burning but as time passed became more and more uncomfortable, frankly painful by the time arrived to Emergency Department.  Denies blood from penis, denies previous episode.  Denies chest pain, shortness of breath, nausea, vomiting, diarrhea, urinary complaints.     Exam:   General: moderate distress from pain  HENT: head NCAT, airway patent  Eyes: anicteric, no conjunctival injection   Lungs: lungs CTAB with good inspiratory effort, no wheezing, no rhonchi, no rales  Cardiac: +S1S2, no obvious m/r/g  GI: abdomen soft with +BS, +suprapubic tenderness, fullness, exam limited secondary to body habitus  : no CVAT, chaperoned, uncircumcised, diffuse edema to penis, no blood or purulence, no genital lesions  MSK: ranging neck and extremities freely   Neuro: moving all extremities spontaneously, nonfocal  Psych: normal mood and affect     A/P: 62M with inability to urinate, suspect urinary retention, unclear etiology given poor follow up, will consider physical obstruction, phimosis, less likely stone, will give pain control, check labs for renal function, metabolic derangement, uro for Craft placement if RN unable to pass

## 2023-02-15 NOTE — ED ADULT NURSE NOTE - OBJECTIVE STATEMENT
61 y/o male PMH A fib not on medications presents to ED reporting urinary retention. Patient reports last urinated yesterday at 8pm and reports burning. On exam, AOx3, speaking in complete sentences. Unlabored, spontaneous respirations, NAD. Heplock placed, labs sent. Awaiting urology to place Craft.

## 2023-02-15 NOTE — PROCEDURE NOTE - ADDITIONAL PROCEDURE DETAILS
Obese patient with urinary retention, unable to urinate since 8 pm the night before.  Patient has severe phimosis.  Under sterile technique, sterile clamp used to open up foreskin until meatus palpable.  However unable to pass catheter through meatus, likely urethral stricture.  Used a ureteral cath and passed into bladder, once in bladder urine drained both in the ucath and around.  Placed wire through ucath and removed ucath.  THen placed a 14F Coude over the wire successfully.  Clear yellow/brown foul smelling urine drained.    Recommend:     -Flomax 0.4mg qHS   -Finasteride 5mg qDay  -Bowel regimen, senna, colace, miralax  -Keep Craft until patient has gotten at least 2 days of Flomax  -May TOV after 2 days of Flomax if ambulating and having soft BMs.  -If fails TOV, replace Craft, home with Craft, TOV as outpatient  -Follow up with [facility]  in clinic [phone]

## 2023-02-15 NOTE — ED PROVIDER NOTE - PROGRESS NOTE DETAILS
LZI Wayne: urology Aniya at beside Attending MD Phillips: Urology at bedside, unable to pass marino, requested pain meds, Morphine ordered, uro to reattempt Attending MD Phillips: Urology successfully placed marino, patient reports feeling immediate relief.  Will continue to monitor LIZ Wayne: urology placed marino successfully LIZ Wayne: ambulated on unit without difficulty. feels comfortable with going home with marino. urged urology f/u and compliance to abx

## 2023-02-15 NOTE — ED ADULT TRIAGE NOTE - CHIEF COMPLAINT QUOTE
brought to ER via ambulance on stretcher. Looks uncomfortable. Denies fever or chills. pulse ox was 93% on room Air per EMS. Pt on 3lpm NC. PMH Afib. no meds x 2 yrs

## 2023-02-15 NOTE — PROCEDURE NOTE - NSURITECHNIQUE_GU_A_CORE
Proper hand hygiene was performed/Sterile gloves were worn for the duration of the procedure/A sterile drape was used to cover all adjacent areas

## 2023-02-15 NOTE — ED PROVIDER NOTE - NSFOLLOWUPINSTRUCTIONS_ED_ALL_ED_FT
Follow up with your Primary Care Physician within the next 2-3 days  Bring a copy of your test results with you to your appointment  Continue your current medication regimen  Return to the Emergency Room if you experience new or worsening symptoms abdominal pain, nausea, vomiting, fever chills, cough, chest pain, shortness of breath, dizziness, slurred speech, weakness, gait abnormality   COMPLETE COURSE OF ANTIBIOTIC CEFDINIR TWICE DAILY    ESTABLISH CARE WITH UROLOGY   Address: [address] [place], [place] [address], [place] [address]  Phone: [phone] Follow up with your Primary Care Physician within the next 2-3 days  Bring a copy of your test results with you to your appointment  Continue your current medication regimen  Return to the Emergency Room if you experience new or worsening symptoms abdominal pain, nausea, vomiting, fever chills, cough, chest pain, shortness of breath, dizziness, slurred speech, weakness, gait abnormality   COMPLETE COURSE OF ANTIBIOTIC CEFDINIR TWICE DAILY    ESTABLISH CARE WITH UROLOGY   Address: [address] [place] [address] [id number], [place] [address]  Phone: [phone]    TAKE FLOMAX AND FINASTERIDE AS DIRECTED BY UROLOGY

## 2023-02-16 LAB
CULTURE RESULTS: SIGNIFICANT CHANGE UP
SPECIMEN SOURCE: SIGNIFICANT CHANGE UP

## 2023-02-17 NOTE — ED POST DISCHARGE NOTE - ADDITIONAL DOCUMENTATION
2/17/23- spoke with patient, informed him of the result and need to have repeated by his doctor. pt states has appointment for next thursday, pt instructed to try to move up the appointment, pt understands and agrees to.

## 2023-02-23 ENCOUNTER — APPOINTMENT (OUTPATIENT)
Dept: UROLOGY | Facility: CLINIC | Age: 63
End: 2023-02-23
Payer: COMMERCIAL

## 2023-02-23 VITALS
RESPIRATION RATE: 16 BRPM | DIASTOLIC BLOOD PRESSURE: 80 MMHG | HEART RATE: 56 BPM | BODY MASS INDEX: 39.17 KG/M2 | SYSTOLIC BLOOD PRESSURE: 129 MMHG | OXYGEN SATURATION: 98 % | HEIGHT: 75 IN | TEMPERATURE: 97.9 F | WEIGHT: 315 LBS

## 2023-02-23 DIAGNOSIS — Z86.79 PERSONAL HISTORY OF OTHER DISEASES OF THE CIRCULATORY SYSTEM: ICD-10-CM

## 2023-02-23 PROCEDURE — 99204 OFFICE O/P NEW MOD 45 MIN: CPT

## 2023-02-23 RX ORDER — TAMSULOSIN HYDROCHLORIDE 0.4 MG/1
0.4 CAPSULE ORAL
Qty: 60 | Refills: 2 | Status: ACTIVE | COMMUNITY
Start: 2023-02-23 | End: 1900-01-01

## 2023-02-23 NOTE — ASSESSMENT
[FreeTextEntry1] : 63 yo male with recent episode of urinary retention requiring marino catheter placement. He appeared to have phimosis requiring manipulation in the ED and a 14 fr coude catheter placed over a wire as patient had possible urethral stricture. He has been on tamsulosin and finasteride for 1 week and would like to continue on medication for 2 more weeks before attempting trial of void and be mentally prepared for possibility of replacement of marino. Given his phimosis, will prescribe betamethasone ointment bid to help loosen foreskin. We reviewed his labs including Cr at baseline of 1.25 and urine culture which was contaminated (pt sent on cefdinir for 1 week following marino placement). \par \par Plan\par - C/W tamsulosin and finasteride --> Refills sent to pharmacy\par - Betamethasone ointment BID to foreskin \par - Follow up in 2 weeks for trial of void

## 2023-02-23 NOTE — HISTORY OF PRESENT ILLNESS
[FreeTextEntry1] : 63 yo male with recent visit to the ED for urinary retention. States that over the past 6-8 months he's noted his urinary stream becoming weaker and has had worsening frequency. About 1 week ago he was unable to void overnight and went to [facility] ED where he had multiple attempts at marino placement. He was noted to have a phimosis which required the provider to use a clamp to open the foreskin and identify the meatus. They noted a possible urethral stricture and needed to use an open ended ureteral catheter to access the bladder and place a 14 fr coude over a wire. He was discharged with the marino and started on tamsulosin and finasteride.\par \par We discussed trial of void and the possibility of requiring manipulation of his foreskin if unable to pass trial of void. He would prefer to wait with medication for 2 weeks.

## 2023-02-23 NOTE — PHYSICAL EXAM
[Normal Appearance] : normal appearance [Edema] : no peripheral edema [Abdomen Soft] : soft [Abdomen Tenderness] : non-tender [Prostate Tenderness] : the prostate was not tender [No Prostate Nodules] : no prostate nodules [Prostate Size ___ gm] : prostate size [unfilled] gm [Normal Station and Gait] : the gait and station were normal for the patient's age [] : no rash [No Focal Deficits] : no focal deficits [Not Anxious] : not anxious [FreeTextEntry1] : marino in place with yellow urine.

## 2023-02-24 ENCOUNTER — EMERGENCY (EMERGENCY)
Facility: HOSPITAL | Age: 63
LOS: 1 days | Discharge: HOME | End: 2023-02-24
Attending: EMERGENCY MEDICINE
Payer: COMMERCIAL

## 2023-02-24 VITALS
DIASTOLIC BLOOD PRESSURE: 70 MMHG | RESPIRATION RATE: 16 BRPM | SYSTOLIC BLOOD PRESSURE: 125 MMHG | OXYGEN SATURATION: 94 % | HEART RATE: 94 BPM | TEMPERATURE: 98 F

## 2023-02-24 VITALS
HEART RATE: 85 BPM | SYSTOLIC BLOOD PRESSURE: 115 MMHG | OXYGEN SATURATION: 98 % | DIASTOLIC BLOOD PRESSURE: 55 MMHG | RESPIRATION RATE: 18 BRPM | TEMPERATURE: 98 F

## 2023-02-24 LAB
APPEARANCE UR: ABNORMAL
BACTERIA # UR AUTO: NEGATIVE — SIGNIFICANT CHANGE UP
BILIRUB UR-MCNC: NEGATIVE — SIGNIFICANT CHANGE UP
COLOR SPEC: YELLOW — SIGNIFICANT CHANGE UP
DIFF PNL FLD: ABNORMAL
EPI CELLS # UR: 1 — SIGNIFICANT CHANGE UP
GLUCOSE UR QL: NEGATIVE — SIGNIFICANT CHANGE UP
HYALINE CASTS # UR AUTO: 2 /LPF — SIGNIFICANT CHANGE UP (ref 0–7)
KETONES UR-MCNC: NEGATIVE — SIGNIFICANT CHANGE UP
LEUKOCYTE ESTERASE UR-ACNC: NEGATIVE — SIGNIFICANT CHANGE UP
NITRITE UR-MCNC: NEGATIVE — SIGNIFICANT CHANGE UP
PH UR: 6 — SIGNIFICANT CHANGE UP (ref 5–8)
PROT UR-MCNC: ABNORMAL
RBC CASTS # UR COMP ASSIST: 10 /HPF — HIGH (ref 0–4)
SP GR SPEC: 1.03 — SIGNIFICANT CHANGE UP (ref 1.01–1.02)
UROBILINOGEN FLD QL: NEGATIVE — SIGNIFICANT CHANGE UP
WBC UR QL: 16 /HPF — HIGH (ref 0–5)

## 2023-02-24 PROCEDURE — 99283 EMERGENCY DEPT VISIT LOW MDM: CPT

## 2023-02-24 PROCEDURE — 87086 URINE CULTURE/COLONY COUNT: CPT

## 2023-02-24 PROCEDURE — 99284 EMERGENCY DEPT VISIT MOD MDM: CPT

## 2023-02-24 PROCEDURE — 81001 URINALYSIS AUTO W/SCOPE: CPT

## 2023-02-24 RX ORDER — CHOLECALCIFEROL (VITAMIN D3) 125 MCG
2 CAPSULE ORAL
Qty: 0 | Refills: 0 | DISCHARGE

## 2023-02-24 RX ORDER — IBUPROFEN 200 MG
2 TABLET ORAL
Qty: 0 | Refills: 0 | DISCHARGE

## 2023-02-24 RX ORDER — ASPIRIN/CALCIUM CARB/MAGNESIUM 324 MG
1 TABLET ORAL
Qty: 0 | Refills: 0 | DISCHARGE

## 2023-02-24 NOTE — ED PROVIDER NOTE - PATIENT PORTAL LINK FT
You can access the FollowMyHealth Patient Portal offered by [facility] by registering at the following website: [email or web address]. By joining [employer]’s FollowMyHealth portal, you will also be able to view your health information using other applications (apps) compatible with our system.

## 2023-02-24 NOTE — ED PROVIDER NOTE - CLINICAL SUMMARY MEDICAL DECISION MAKING FREE TEXT BOX
63yo M w/ hx  atrial flutter c/b Left atrial appendage thrombus, Pulm embolus, has genetic hypercoagulation, cardiomyopathy presenting with urinary catheter complications. Patient had urinary catheter placed 2/15 for urinary retention, very difficult placement, done by urology.  Patient followed up with urologist today and Craft was working.  At 3 PM patient emptied his bag and since then has not had any urine output.  Patient feels bladder spasms and suprapubic pressure.  Otherwise asymptomatic. Exam shows leg bag with low amount of orange urine. Will attempt to flush and reassess. 63yo M w/ hx  atrial flutter c/b Left atrial appendage thrombus, Pulm embolus, has genetic hypercoagulation, cardiomyopathy presenting with urinary catheter complications. Patient had urinary catheter placed 2/15 for urinary retention, very difficult placement, done by urology.  Patient followed up with urologist today and Marino was working.  At 3 PM patient emptied his bag and since then has not had any urine output.  Patient feels bladder spasms and suprapubic pressure.  Otherwise asymptomatic. Exam shows leg bag with low amount of orange urine. Will attempt to flush and reassess.    SILVIA Vela MD: 63 y/o male with PMH as above, recently inserted marino for urinary retention on 2/15, who presents for no output from marino bag and bladder spasms/pressure since 3pm today. Denies any recent bleeding from catheter or catheter manipulation. Spoke with urology PA who recommended flushing catheter with saline, this caused urine to flow freely and pt's sx improved. u/a and cx sent. Pt advised outpt f/u with return precautions.

## 2023-02-24 NOTE — ED ADULT NURSE NOTE - NSFALLRSKPASTHIST_ED_ALL_ED
[facility]  Procedure History and Physical        Pre-Procedural Diagnosis/Indication/Chief complaint:  Surveillance of colon polyps    Procedure Planned:  Colonoscopy    History Obtained From:  Patient    HISTORY OF PRESENT ILLNESS:    Karrie Tate is a 50 year old female with a hx of MS on Tysabri, HTN, MDD, HLD who presents for surveillance of colon polyps.    Patient had colonoscopy 22 w/ inadequate prep.     Patient was seen by NP recently on 10/2021 for GERD and chronic nausea after eating cereal. She has failed sucralfate. She was recently switched to Pepcid. She had EGD 22 which was negative for etiology, bx negative. Has been on daily ppi since then and is doing well.     Past Medical History:    Past Medical History:   Diagnosis Date   • Acute blood loss as cause of postoperative anemia 2015   • Attention deficit disorder    • Depression    • Essential (primary) hypertension    • Generalized abdominal pain 2020   • Gingivitis, acute 5/15/2020   • Multiple sclerosis (CMS/HCC)    • Other lesions of oral mucosa 2020   • PML (progressive multifocal leukoencephalopathy) (CMS/HCC) 2021   • Preop examination 10/6/2020   • Swelling of joint of upper arm, right 2020   • Syncope 2020       Past Surgical History:    Past Surgical History:   Procedure Laterality Date   • Ankle surgery  26 years ago   •  section, classic  1993   • Total hip replacement Bilateral         Medications:  Current Outpatient Medications   Medication Sig Dispense Refill   • traMADol (ULTRAM) 50 MG tablet TAKE 1 TABLET EVERY 8 HOURS AS NEEDED FOR PAIN 90 tablet 1   • pantoprazole (PROTONIX) 40 MG tablet Take 1 tablet by mouth daily. 30 tablet 5   • sumatriptan (IMITREX) 100 MG tablet      • Ubrelvy 100 MG tablet      • tolterodine (DETROL LA) 4 MG 24 hr capsule Take 1 capsule by mouth daily. 90 capsule 3   • baclofen (LIORESAL) 20 MG tablet Take 1 tablet by mouth 3 times daily. 270  tablet 1   • gabapentin (NEURONTIN) 600 MG tablet Take 1 tablet by mouth 3 times daily. 270 tablet 2   • amLODIPine (NORVASC) 5 MG tablet Take 1 tablet by mouth daily. 90 tablet 2   • atorvastatin (LIPITOR) 10 MG tablet Take 1 tablet by mouth daily. 90 tablet 3   • topiramate (TOPAMAX) 50 MG tablet Take 1 tablet by mouth 2 times daily. 180 tablet 0   • VITAMIN D, CHOLECALCIFEROL, PO Take 4,000 Int'l Units by mouth daily.     • Natalizumab (TYSABRI IV) Inject into the vein every 30 days.     • venlafaxine XR (EFFEXOR XR) 150 MG 24 hr capsule Take 1 capsule by mouth daily. (Patient taking differently: Take 150 mg by mouth daily. Depression) 30 capsule 1     No current facility-administered medications for this encounter.       Current Outpatient Medications   Medication Sig Dispense Refill   • traMADol (ULTRAM) 50 MG tablet TAKE 1 TABLET EVERY 8 HOURS AS NEEDED FOR PAIN 90 tablet 1   • pantoprazole (PROTONIX) 40 MG tablet Take 1 tablet by mouth daily. 30 tablet 5   • sumatriptan (IMITREX) 100 MG tablet      • Ubrelvy 100 MG tablet      • tolterodine (DETROL LA) 4 MG 24 hr capsule Take 1 capsule by mouth daily. 90 capsule 3   • baclofen (LIORESAL) 20 MG tablet Take 1 tablet by mouth 3 times daily. 270 tablet 1   • gabapentin (NEURONTIN) 600 MG tablet Take 1 tablet by mouth 3 times daily. 270 tablet 2   • amLODIPine (NORVASC) 5 MG tablet Take 1 tablet by mouth daily. 90 tablet 2   • atorvastatin (LIPITOR) 10 MG tablet Take 1 tablet by mouth daily. 90 tablet 3   • topiramate (TOPAMAX) 50 MG tablet Take 1 tablet by mouth 2 times daily. 180 tablet 0   • VITAMIN D, CHOLECALCIFEROL, PO Take 4,000 Int'l Units by mouth daily.     • Natalizumab (TYSABRI IV) Inject into the vein every 30 days.     • venlafaxine XR (EFFEXOR XR) 150 MG 24 hr capsule Take 1 capsule by mouth daily. (Patient taking differently: Take 150 mg by mouth daily. Depression) 30 capsule 1     No current facility-administered medications for this encounter.        Allergies:    ALLERGIES:   Allergen Reactions   • Carrot   (Food Or Med) HIVES   • No Name Available Other (See Comments)     No Known Drug Allergies  Other - allergic to carrots                  History of allergic reaction to anesthesia:  no    Social History  Social History     Tobacco Use   • Smoking status: Never Smoker   • Smokeless tobacco: Never Used   Substance Use Topics   • Alcohol use: No   • Drug use: No       Family History  Family History   Problem Relation Age of Onset   • Patient is unaware of any medical problems Mother    • Cancer Father    • Hypertension Father    • Diabetes Father    • Heart disease Father    • Patient is unaware of any medical problems Brother        REVIEW OF SYSTEMS:    12 of 14 systems reviewed, and negative except as mentioned in the HPI.       PHYSICAL EXAM:    Vitals:  There were no vitals taken for this visit.  There were no vitals taken for this visit.   There were no vitals filed for this visit.      There is no height or weight on file to calculate BMI.    Focused Exam related to procedure:      General: well appearing, in NAD  Eyes: EOMI  Head: normocephalic, atraumatic  Resp: lungs CTAB, no inc WOB  CV: RRR  Abd: soft, non-distended, obese, no ttp, normoactive BS  Msk: WWP b/l, no LE edema  Skin: no rashes or lesions appreciated on exam  Neuro: Alert and oriented, no gross deficits  Psych: appropriate mood/affect      ASSESSMENT AND PLAN:    1. Karrie Tate is a 50 year old female with above specified procedure planned.  Expected Sedation/Anesthesia Type:  MAC    2.  ASA (American Society Anesthesiology) Anesthesia Status:  3    3.  Procedure options, risks and benefits reviewed with patient.  Patient expresses understanding.    4.  Risks discussed included but are not limited to missed lesions, sedation, bleeding, infection and perforation.     5.  Patient is on no AC.    6.  Consent has been signed:  By patient and provider.    Thank you for allowing me  to participate in the care of this patient.      Denice Rizvi MD  [facility]       no

## 2023-02-24 NOTE — ED PROVIDER NOTE - OBJECTIVE STATEMENT
63yo M w/ hx 61yo M w/ hx  atrial flutter c/b Left atrial appendage thrombus, Pulm embolus, has genetic hypercoagulation, cardiomyopathy presenting with urinary catheter complications. Patient had urinary catheter placed 2/15 for urinary retention, very difficult placement, done by urology.  Patient followed up with urologist today and Craft was working.  At 3 PM patient emptied his bag and since then has not had any urine output.  Patient feels bladder spasms and suprapubic pressure.  Otherwise asymptomatic.

## 2023-02-24 NOTE — ED PROVIDER NOTE - PHYSICAL EXAMINATION
General appearance: NAD, conversant, afebrile    Eyes: anicteric sclerae, GILDARDO, EOMI   HENT: Atraumatic; oropharynx clear, MMM and no ulcerations, no pharyngeal erythema or exudate   Neck: Trachea midline; Full range of motion, supple   Pulm: CTA bl, normal respiratory effort and no intercostal retractions, normal work of breathing   CV: RRR, No murmurs, rubs, or gallops. 2+ peripheral pulses.   Abdomen: Soft, non-tender, non-distended; no guarding or rebound   : marino in place, dark orange urine in leg bag, Chaperone-Dr. Vela and Dr. Gabriel   Extremities: No peripheral edema or extremity lymphadenopathy. 5/5 strength in all four extremities.   Skin: Dry, normal temperature, turgor and texture; no rash, ulcers or subcutaneous nodules   Psych: Appropriate affect, cooperative; alert and oriented to person, place and time

## 2023-02-24 NOTE — ED ADULT NURSE NOTE - OBJECTIVE STATEMENT
63 YO male with via walk in presenting with complaints of  catheter problems. Patient reports  had urinary catheter placed 2/15 for urinary retention, very difficult placement, done by urology.  Patient followed up with urologist today and Craft was working.  At 3 PM patient emptied his bag and since then has not had any urine output.  Patient feels bladder spasms and suprapubic pressure.  cathter flushed and now draining.   Pt Axox4, espirations even, & non-labored Abdomen soft, non-tender, non-distended. t. Pt placed in position of comfort. Pt educated on call bell system and provided call bell. Bed in lowest position, wheels locked, appropriate side rails raised. Pt denies needs at this time.

## 2023-02-24 NOTE — ED PROVIDER NOTE - DIFFERENTIAL DIAGNOSIS
Differential Diagnosis Ddx includes, however, is not limited to: clogged catheter, dislodged catheter, uti

## 2023-02-24 NOTE — ED PROVIDER NOTE - PROGRESS NOTE DETAILS
Nory Bryant- flushed marino, had some urine return. will reassess. Nory Bryant- urine bag full. pt feels much better. UA improved from prior. instructed pt to f/u with urologist, he agrees.

## 2023-02-24 NOTE — ED PROVIDER NOTE - INTERNATIONAL TRAVEL
No Social hx obtained from chart and pt- pt poor historian, forgetful, repeatedly states "I don't remember"

## 2023-02-24 NOTE — ED PROVIDER NOTE - NSFOLLOWUPINSTRUCTIONS_ED_ALL_ED_FT
You were seen in the ER for urinary catheter complication. The catheter was flushed which helped the catheter drain and start working again.    Please follow up with your urologist.  Please follow up with your primary care doctor.    Please return to the ER if you have worsening symptoms including fever, chest pain, shortness of breath, abdominal pain, nausea, vomiting, diarrhea, weakness or lightheadedness/fainting.

## 2023-02-25 LAB
CULTURE RESULTS: NO GROWTH — SIGNIFICANT CHANGE UP
SPECIMEN SOURCE: SIGNIFICANT CHANGE UP

## 2023-03-15 ENCOUNTER — APPOINTMENT (OUTPATIENT)
Dept: UROLOGY | Facility: CLINIC | Age: 63
End: 2023-03-15
Payer: COMMERCIAL

## 2023-03-15 VITALS — BODY MASS INDEX: 39.17 KG/M2 | HEIGHT: 75 IN | WEIGHT: 315 LBS

## 2023-03-15 DIAGNOSIS — N13.8 BENIGN PROSTATIC HYPERPLASIA WITH LOWER URINARY TRACT SYMPMS: ICD-10-CM

## 2023-03-15 DIAGNOSIS — N40.1 BENIGN PROSTATIC HYPERPLASIA WITH LOWER URINARY TRACT SYMPMS: ICD-10-CM

## 2023-03-15 DIAGNOSIS — Z87.438 PERSONAL HISTORY OF OTHER DISEASES OF MALE GENITAL ORGANS: ICD-10-CM

## 2023-03-15 PROBLEM — I51.3 INTRACARDIAC THROMBOSIS, NOT ELSEWHERE CLASSIFIED: Chronic | Status: ACTIVE | Noted: 2023-02-15

## 2023-03-15 PROBLEM — I26.99 OTHER PULMONARY EMBOLISM WITHOUT ACUTE COR PULMONALE: Chronic | Status: ACTIVE | Noted: 2023-02-15

## 2023-03-15 PROBLEM — I48.92 UNSPECIFIED ATRIAL FLUTTER: Chronic | Status: ACTIVE | Noted: 2023-02-15

## 2023-03-15 PROBLEM — E66.9 OBESITY, UNSPECIFIED: Chronic | Status: ACTIVE | Noted: 2019-02-22

## 2023-03-15 PROCEDURE — 99214 OFFICE O/P EST MOD 30 MIN: CPT

## 2023-03-15 NOTE — HISTORY OF PRESENT ILLNESS
[FreeTextEntry1] : 63 yo male with episode of urinary retention 1 month ago requiring marino. He was started on finasteride and tamsulosin at that time. He underwent fill and pull today and was able to void without issues. States that he was not as mobile and didn't leave his house because of the marino catheter. He states that prior to episode of retention he had noticed his stream was weaker and he was voiding frequently and not emptying his bladder. States that voiding today was much easier with better flow and no hesitancy compared with the months prior to retention.

## 2023-03-15 NOTE — ASSESSMENT
[FreeTextEntry1] : 63 yo male with recent episode of urinary retention requiring marino catheter placement. He was started on finasteride and tamsulosin and underwent TOV today in the office. He was filled with 200cc of irrigation and able to void without hesitancy approximately 175cc. Did not do a PVR as it would be difficult to get an accurate reading. We discussed behavioral modifications to improve his urinary symptoms and he will get a transrectal US to assess prostate size\par \par Plan\par - C/W tamsulosin and finasteride \par - Trial of void: Filled with 200cc of irrigation and able to void about 175cc without hesitancy\par - Transrectal US to assess prostate size\par - Follow up in 3 months

## 2023-03-15 NOTE — PHYSICAL EXAM
[Normal Appearance] : normal appearance [Abdomen Soft] : soft [Abdomen Tenderness] : non-tender [Edema] : no peripheral edema [] : no respiratory distress [Not Anxious] : not anxious [Normal Station and Gait] : the gait and station were normal for the patient's age [No Focal Deficits] : no focal deficits [Testes Tenderness] : no tenderness of the testes [FreeTextEntry1] : buried penis, somewhat able to assess glans which did not show any meatal stensosis

## 2023-03-16 ENCOUNTER — TRANSCRIPTION ENCOUNTER (OUTPATIENT)
Age: 63
End: 2023-03-16

## 2023-03-21 ENCOUNTER — NON-APPOINTMENT (OUTPATIENT)
Age: 63
End: 2023-03-21

## 2023-06-29 ENCOUNTER — RX RENEWAL (OUTPATIENT)
Age: 63
End: 2023-06-29

## 2023-06-29 RX ORDER — FINASTERIDE 5 MG/1
5 TABLET, FILM COATED ORAL DAILY
Qty: 90 | Refills: 1 | Status: ACTIVE | COMMUNITY
Start: 2023-02-23 | End: 1900-01-01

## 2023-07-05 ENCOUNTER — APPOINTMENT (OUTPATIENT)
Dept: UROLOGY | Facility: CLINIC | Age: 63
End: 2023-07-05

## 2023-09-12 NOTE — ED ADULT NURSE NOTE - CHIEF COMPLAINT QUOTE
indwelling Craft catheter not draining, also c/o "spasms" in bladder Silver Nitrate Text: The wound bed was treated with silver nitrate after the biopsy was performed.

## 2024-08-30 NOTE — ED ADULT TRIAGE NOTE - TEMPERATURE IN CELSIUS (DEGREES C)
These are the physicians at [facility] that  treat knee issues but I think would be good to consider seeing.    Dr. Sade López   36.9